# Patient Record
Sex: FEMALE | Race: BLACK OR AFRICAN AMERICAN | NOT HISPANIC OR LATINO | URBAN - METROPOLITAN AREA
[De-identification: names, ages, dates, MRNs, and addresses within clinical notes are randomized per-mention and may not be internally consistent; named-entity substitution may affect disease eponyms.]

---

## 2017-06-13 ENCOUNTER — EMERGENCY (EMERGENCY)
Facility: HOSPITAL | Age: 52
LOS: 1 days | End: 2017-06-13
Payer: SELF-PAY

## 2017-06-13 PROCEDURE — 73630 X-RAY EXAM OF FOOT: CPT | Mod: 26,RT

## 2017-06-13 PROCEDURE — 28490 TREAT BIG TOE FRACTURE: CPT | Mod: 54

## 2017-06-13 PROCEDURE — 99284 EMERGENCY DEPT VISIT MOD MDM: CPT | Mod: 25

## 2017-09-22 ENCOUNTER — EMERGENCY (EMERGENCY)
Facility: HOSPITAL | Age: 52
LOS: 1 days | End: 2017-09-22
Payer: SELF-PAY

## 2017-09-22 PROCEDURE — 99285 EMERGENCY DEPT VISIT HI MDM: CPT

## 2017-09-22 PROCEDURE — 74176 CT ABD & PELVIS W/O CONTRAST: CPT | Mod: 26

## 2018-07-20 ENCOUNTER — OUTPATIENT (OUTPATIENT)
Dept: OUTPATIENT SERVICES | Facility: HOSPITAL | Age: 53
LOS: 1 days | End: 2018-07-20

## 2018-07-20 ENCOUNTER — EMERGENCY (EMERGENCY)
Facility: HOSPITAL | Age: 53
LOS: 1 days | End: 2018-07-20
Payer: SELF-PAY

## 2018-07-20 PROCEDURE — 99285 EMERGENCY DEPT VISIT HI MDM: CPT

## 2018-07-20 PROCEDURE — 71275 CT ANGIOGRAPHY CHEST: CPT | Mod: 26

## 2018-07-20 PROCEDURE — 71046 X-RAY EXAM CHEST 2 VIEWS: CPT | Mod: 26

## 2018-07-21 ENCOUNTER — OUTPATIENT (OUTPATIENT)
Dept: OUTPATIENT SERVICES | Facility: HOSPITAL | Age: 53
LOS: 1 days | End: 2018-07-21

## 2018-10-17 ENCOUNTER — EMERGENCY (EMERGENCY)
Facility: HOSPITAL | Age: 53
LOS: 1 days | End: 2018-10-17
Payer: MEDICAID

## 2018-10-17 PROCEDURE — 70450 CT HEAD/BRAIN W/O DYE: CPT | Mod: 26

## 2018-12-05 ENCOUNTER — EMERGENCY (EMERGENCY)
Facility: HOSPITAL | Age: 53
LOS: 1 days | End: 2018-12-05
Payer: MEDICAID

## 2018-12-05 PROCEDURE — 99284 EMERGENCY DEPT VISIT MOD MDM: CPT

## 2018-12-11 ENCOUNTER — EMERGENCY (EMERGENCY)
Facility: HOSPITAL | Age: 53
LOS: 1 days | End: 2018-12-11
Payer: MEDICAID

## 2018-12-11 PROCEDURE — 99283 EMERGENCY DEPT VISIT LOW MDM: CPT

## 2018-12-11 PROCEDURE — 73130 X-RAY EXAM OF HAND: CPT | Mod: 26,LT

## 2018-12-11 PROCEDURE — 73030 X-RAY EXAM OF SHOULDER: CPT | Mod: 26,LT

## 2018-12-11 PROCEDURE — 73090 X-RAY EXAM OF FOREARM: CPT | Mod: 26,LT

## 2018-12-11 PROCEDURE — 71045 X-RAY EXAM CHEST 1 VIEW: CPT | Mod: 26

## 2018-12-11 PROCEDURE — 73110 X-RAY EXAM OF WRIST: CPT | Mod: 26,LT

## 2018-12-11 PROCEDURE — 73080 X-RAY EXAM OF ELBOW: CPT | Mod: 26,LT

## 2018-12-11 PROCEDURE — 73060 X-RAY EXAM OF HUMERUS: CPT | Mod: 26,LT

## 2019-07-01 PROBLEM — Z00.00 ENCOUNTER FOR PREVENTIVE HEALTH EXAMINATION: Noted: 2019-07-01

## 2019-07-02 ENCOUNTER — APPOINTMENT (OUTPATIENT)
Dept: CARDIOLOGY | Facility: CLINIC | Age: 54
End: 2019-07-02
Payer: MEDICAID

## 2019-07-02 ENCOUNTER — NON-APPOINTMENT (OUTPATIENT)
Age: 54
End: 2019-07-02

## 2019-07-02 VITALS
BODY MASS INDEX: 30.86 KG/M2 | SYSTOLIC BLOOD PRESSURE: 206 MMHG | WEIGHT: 192 LBS | DIASTOLIC BLOOD PRESSURE: 122 MMHG | OXYGEN SATURATION: 95 % | HEART RATE: 80 BPM | HEIGHT: 66 IN

## 2019-07-02 DIAGNOSIS — Z82.49 FAMILY HISTORY OF ISCHEMIC HEART DISEASE AND OTHER DISEASES OF THE CIRCULATORY SYSTEM: ICD-10-CM

## 2019-07-02 DIAGNOSIS — Z86.79 PERSONAL HISTORY OF OTHER DISEASES OF THE CIRCULATORY SYSTEM: ICD-10-CM

## 2019-07-02 DIAGNOSIS — R00.2 PALPITATIONS: ICD-10-CM

## 2019-07-02 DIAGNOSIS — Z78.9 OTHER SPECIFIED HEALTH STATUS: ICD-10-CM

## 2019-07-02 DIAGNOSIS — Z80.41 FAMILY HISTORY OF MALIGNANT NEOPLASM OF OVARY: ICD-10-CM

## 2019-07-02 DIAGNOSIS — F17.200 NICOTINE DEPENDENCE, UNSPECIFIED, UNCOMPLICATED: ICD-10-CM

## 2019-07-02 DIAGNOSIS — Z87.09 PERSONAL HISTORY OF OTHER DISEASES OF THE RESPIRATORY SYSTEM: ICD-10-CM

## 2019-07-02 DIAGNOSIS — F19.90 OTHER PSYCHOACTIVE SUBSTANCE USE, UNSPECIFIED, UNCOMPLICATED: ICD-10-CM

## 2019-07-02 DIAGNOSIS — Z83.3 FAMILY HISTORY OF DIABETES MELLITUS: ICD-10-CM

## 2019-07-02 PROCEDURE — 93000 ELECTROCARDIOGRAM COMPLETE: CPT

## 2019-07-02 PROCEDURE — 99202 OFFICE O/P NEW SF 15 MIN: CPT

## 2019-07-02 RX ORDER — FLUTICASONE PROPIONATE 220 MCG
AEROSOL WITH ADAPTER (GRAM) INHALATION
Refills: 0 | Status: ACTIVE | COMMUNITY

## 2019-07-02 RX ORDER — ALBUTEROL SULFATE 90 UG/1
108 (90 BASE) AEROSOL, METERED RESPIRATORY (INHALATION)
Refills: 0 | Status: ACTIVE | COMMUNITY

## 2019-07-02 NOTE — ASSESSMENT
[FreeTextEntry1] : 53-year-old female past medical history as detailed above with the following recommendations.\par \par Severe, uncontrolled hypertension. I discussed the risk of acute neurological event such as CVA or TIA in the presence of elevated blood pressure. She declines ER visit at this time. She will reinitiate her medications to include clonidine 0.3 mg t.i.d., as well as Hydrocort thiazide 25 mg daily. She will start verapamil at 120 (half a tab of 240) and if she has no symptoms after a few days may go up to her typical dose of 240 on a daily basis. She will have repeat blood pressure evaluation in a few days.\par \par Abnormal EKG, dyspnea on exertion recommend echocardiography. Once her blood pressure is controlled could consider ischemic evaluation.\par \par Palpitations, recommend 24-hour Holter monitor.\par \par We discussed nonpharmacologic ways of reducing her blood pressure and she will continue to reduce salt, avoid caffeine and alcohol. Tobacco cessation is also strongly recommended.\par \par She'll be seen in follow up after testing is completed.

## 2019-07-02 NOTE — PHYSICAL EXAM
[General Appearance - Well Developed] : well developed [Normal Appearance] : normal appearance [Well Groomed] : well groomed [General Appearance - Well Nourished] : well nourished [General Appearance - In No Acute Distress] : no acute distress [No Deformities] : no deformities [Normal Conjunctiva] : the conjunctiva exhibited no abnormalities [Eyelids - No Xanthelasma] : the eyelids demonstrated no xanthelasmas [Normal Oral Mucosa] : normal oral mucosa [No Oral Pallor] : no oral pallor [Normal Jugular Venous A Waves Present] : normal jugular venous A waves present [No Oral Cyanosis] : no oral cyanosis [Normal Jugular Venous V Waves Present] : normal jugular venous V waves present [No Jugular Venous Toney A Waves] : no jugular venous toney A waves [Heart Rate And Rhythm] : heart rate and rhythm were normal [Heart Sounds] : normal S1 and S2 [Murmurs] : no murmurs present [Exaggerated Use Of Accessory Muscles For Inspiration] : no accessory muscle use [Respiration, Rhythm And Depth] : normal respiratory rhythm and effort [Abdomen Soft] : soft [Auscultation Breath Sounds / Voice Sounds] : lungs were clear to auscultation bilaterally [Abdomen Tenderness] : non-tender [Abdomen Mass (___ Cm)] : no abdominal mass palpated [Abnormal Walk] : normal gait [Gait - Sufficient For Exercise Testing] : the gait was sufficient for exercise testing [Cyanosis, Localized] : no localized cyanosis [Nail Clubbing] : no clubbing of the fingernails [Petechial Hemorrhages (___cm)] : no petechial hemorrhages [Skin Color & Pigmentation] : normal skin color and pigmentation [] : no rash [No Venous Stasis] : no venous stasis [Skin Lesions] : no skin lesions [No Skin Ulcers] : no skin ulcer [No Xanthoma] : no  xanthoma was observed [Oriented To Time, Place, And Person] : oriented to person, place, and time [Affect] : the affect was normal [Mood] : the mood was normal [No Anxiety] : not feeling anxious

## 2019-07-02 NOTE — REVIEW OF SYSTEMS
[Headache] : headache [see HPI] : see HPI [Dyspnea on exertion] : dyspnea during exertion [Palpitations] : palpitations [Negative] : Heme/Lymph

## 2019-07-02 NOTE — HISTORY OF PRESENT ILLNESS
[FreeTextEntry1] : 53-year-old female past medical history significant for long-standing uncontrolled hypertension which is likely familial, additional history of abnormal EKG, and COPD/asthma.\par \par She presents today in cardiac consultation at the request of her primary care physician.\par \par She's been previously treated with verapamil 240 mg daily, hydrocortisone 25 mg daily and clonidine 0.3 mg t.i.d.\par \par Unfortunately she ran out of her medications and because of confusion at her pharmacy was unable to be refilled recently.\par \par At present she complains of mild headache, without visual change, or neurological deficit. She denies current chest discomfort, dizziness, syncope or near syncope, edema, PND or orthopnea.\par \par She does complain of daily palpitations described as fluttering as well as chronic dyspnea on exertion, however this is difficult to discern if this is related to COPD or asthma.\par \par Recent EKG reveals normal sinus rhythm with IVCD and mild nonspecific ST segment changes.\par \par Laboratories from March of 2019 revealed normal CMP with exception to elevate glucose of 124, as well as cholesterol profile and which was normal.

## 2019-07-05 ENCOUNTER — APPOINTMENT (OUTPATIENT)
Dept: CARDIOLOGY | Facility: CLINIC | Age: 54
End: 2019-07-05

## 2019-07-07 ENCOUNTER — EMERGENCY (EMERGENCY)
Facility: HOSPITAL | Age: 54
LOS: 1 days | End: 2019-07-07
Admitting: EMERGENCY MEDICINE
Payer: MEDICAID

## 2019-07-07 PROCEDURE — 99283 EMERGENCY DEPT VISIT LOW MDM: CPT

## 2019-07-07 PROCEDURE — 73630 X-RAY EXAM OF FOOT: CPT | Mod: 26,RT

## 2019-07-09 ENCOUNTER — EMERGENCY (EMERGENCY)
Facility: HOSPITAL | Age: 54
LOS: 1 days | End: 2019-07-09
Admitting: EMERGENCY MEDICINE
Payer: MEDICAID

## 2019-07-09 PROCEDURE — 69000 DRG XTRNL EAR ABSC/HEM SMPL: CPT

## 2019-07-09 PROCEDURE — 99283 EMERGENCY DEPT VISIT LOW MDM: CPT | Mod: 25

## 2019-07-16 ENCOUNTER — APPOINTMENT (OUTPATIENT)
Dept: CARDIOLOGY | Facility: CLINIC | Age: 54
End: 2019-07-16
Payer: MEDICAID

## 2019-07-16 PROCEDURE — 93306 TTE W/DOPPLER COMPLETE: CPT

## 2019-07-19 PROCEDURE — 93224 XTRNL ECG REC UP TO 48 HRS: CPT

## 2019-08-02 ENCOUNTER — APPOINTMENT (OUTPATIENT)
Dept: CARDIOLOGY | Facility: CLINIC | Age: 54
End: 2019-08-02
Payer: MEDICAID

## 2019-08-02 VITALS
BODY MASS INDEX: 29.73 KG/M2 | HEIGHT: 66 IN | SYSTOLIC BLOOD PRESSURE: 140 MMHG | WEIGHT: 185 LBS | OXYGEN SATURATION: 94 % | HEART RATE: 81 BPM | DIASTOLIC BLOOD PRESSURE: 86 MMHG

## 2019-08-02 DIAGNOSIS — I10 ESSENTIAL (PRIMARY) HYPERTENSION: ICD-10-CM

## 2019-08-02 PROCEDURE — 99214 OFFICE O/P EST MOD 30 MIN: CPT

## 2019-08-26 ENCOUNTER — MEDICATION RENEWAL (OUTPATIENT)
Age: 54
End: 2019-08-26

## 2019-08-27 ENCOUNTER — EMERGENCY (EMERGENCY)
Facility: HOSPITAL | Age: 54
LOS: 1 days | End: 2019-08-27
Admitting: EMERGENCY MEDICINE
Payer: MEDICAID

## 2019-08-27 PROCEDURE — 74177 CT ABD & PELVIS W/CONTRAST: CPT | Mod: 26

## 2019-09-05 ENCOUNTER — EMERGENCY (EMERGENCY)
Facility: HOSPITAL | Age: 54
LOS: 1 days | End: 2019-09-05
Admitting: EMERGENCY MEDICINE
Payer: MEDICAID

## 2019-09-05 PROCEDURE — 99284 EMERGENCY DEPT VISIT MOD MDM: CPT

## 2019-09-05 PROCEDURE — 74177 CT ABD & PELVIS W/CONTRAST: CPT | Mod: 26

## 2019-09-13 ENCOUNTER — APPOINTMENT (OUTPATIENT)
Dept: CARDIOLOGY | Facility: CLINIC | Age: 54
End: 2019-09-13

## 2019-09-23 RX ORDER — HYDROCHLOROTHIAZIDE 25 MG/1
25 TABLET ORAL DAILY
Qty: 30 | Refills: 0 | Status: ACTIVE | COMMUNITY
Start: 2019-07-02 | End: 1900-01-01

## 2019-10-15 ENCOUNTER — APPOINTMENT (OUTPATIENT)
Dept: CARDIOLOGY | Facility: CLINIC | Age: 54
End: 2019-10-15

## 2019-10-17 ENCOUNTER — EMERGENCY (EMERGENCY)
Facility: HOSPITAL | Age: 54
LOS: 1 days | End: 2019-10-17
Admitting: EMERGENCY MEDICINE
Payer: MEDICAID

## 2019-10-17 PROCEDURE — 71045 X-RAY EXAM CHEST 1 VIEW: CPT | Mod: 26

## 2019-10-17 PROCEDURE — 99285 EMERGENCY DEPT VISIT HI MDM: CPT

## 2019-10-18 PROCEDURE — 93010 ELECTROCARDIOGRAM REPORT: CPT

## 2019-10-23 ENCOUNTER — APPOINTMENT (OUTPATIENT)
Dept: CARDIOLOGY | Facility: CLINIC | Age: 54
End: 2019-10-23

## 2019-11-25 RX ORDER — VERAPAMIL HYDROCHLORIDE 240 MG/1
240 CAPSULE, DELAYED RELEASE PELLETS ORAL DAILY
Qty: 90 | Refills: 2 | Status: ACTIVE | COMMUNITY
Start: 2019-07-02 | End: 1900-01-01

## 2019-11-28 ENCOUNTER — EMERGENCY (EMERGENCY)
Facility: HOSPITAL | Age: 54
LOS: 1 days | End: 2019-11-28
Admitting: EMERGENCY MEDICINE
Payer: MEDICAID

## 2019-11-28 PROCEDURE — 71045 X-RAY EXAM CHEST 1 VIEW: CPT | Mod: 26

## 2019-11-28 PROCEDURE — 99285 EMERGENCY DEPT VISIT HI MDM: CPT

## 2019-11-29 PROCEDURE — 93010 ELECTROCARDIOGRAM REPORT: CPT

## 2019-12-31 ENCOUNTER — EMERGENCY (EMERGENCY)
Facility: HOSPITAL | Age: 54
LOS: 1 days | End: 2019-12-31
Admitting: EMERGENCY MEDICINE

## 2020-01-01 ENCOUNTER — EMERGENCY (EMERGENCY)
Facility: HOSPITAL | Age: 55
LOS: 1 days | End: 2020-01-01
Admitting: EMERGENCY MEDICINE

## 2020-01-03 ENCOUNTER — NON-APPOINTMENT (OUTPATIENT)
Age: 55
End: 2020-01-03

## 2020-01-03 ENCOUNTER — APPOINTMENT (OUTPATIENT)
Dept: CARDIOLOGY | Facility: CLINIC | Age: 55
End: 2020-01-03
Payer: MEDICAID

## 2020-01-03 VITALS
DIASTOLIC BLOOD PRESSURE: 94 MMHG | WEIGHT: 198 LBS | BODY MASS INDEX: 31.82 KG/M2 | HEART RATE: 59 BPM | OXYGEN SATURATION: 98 % | SYSTOLIC BLOOD PRESSURE: 142 MMHG | HEIGHT: 66 IN

## 2020-01-03 PROCEDURE — 93000 ELECTROCARDIOGRAM COMPLETE: CPT

## 2020-01-03 PROCEDURE — 99214 OFFICE O/P EST MOD 30 MIN: CPT

## 2020-01-03 NOTE — HISTORY OF PRESENT ILLNESS
[FreeTextEntry1] : 54F w/ PMH of HTN, COPD/asthma presenting for routine follow up.  She has had two weeks of left arm pain and numbness/tingling.  She attempted to go to the ER but there was a long weight on NYE and New Year's day.  She notes that the pain usually occurs with sitting and improves if she changes her arm position.  She has scant chest pains that sometimes occur with exertion.  She has been mostly compliant with her medications.  She missed her stress testing appt.

## 2020-01-03 NOTE — REASON FOR VISIT
[Follow-Up - Clinic] : a clinic follow-up of [Chest Pain] : chest pain [Hypertension] : hypertension [Dyspnea] : dyspnea [Medication Management] : Medication management

## 2020-01-03 NOTE — PHYSICAL EXAM
[General Appearance - Well Developed] : well developed [Normal Appearance] : normal appearance [Well Groomed] : well groomed [General Appearance - Well Nourished] : well nourished [No Deformities] : no deformities [Normal Conjunctiva] : the conjunctiva exhibited no abnormalities [General Appearance - In No Acute Distress] : no acute distress [Normal Oral Mucosa] : normal oral mucosa [Eyelids - No Xanthelasma] : the eyelids demonstrated no xanthelasmas [No Oral Pallor] : no oral pallor [Normal Jugular Venous A Waves Present] : normal jugular venous A waves present [No Oral Cyanosis] : no oral cyanosis [Normal Jugular Venous V Waves Present] : normal jugular venous V waves present [No Jugular Venous Toney A Waves] : no jugular venous toney A waves [Respiration, Rhythm And Depth] : normal respiratory rhythm and effort [Exaggerated Use Of Accessory Muscles For Inspiration] : no accessory muscle use [Auscultation Breath Sounds / Voice Sounds] : lungs were clear to auscultation bilaterally [Heart Rate And Rhythm] : heart rate and rhythm were normal [Heart Sounds] : normal S1 and S2 [Murmurs] : no murmurs present [Abdomen Soft] : soft [Abdomen Tenderness] : non-tender [Abdomen Mass (___ Cm)] : no abdominal mass palpated [Abnormal Walk] : normal gait [Gait - Sufficient For Exercise Testing] : the gait was sufficient for exercise testing [Nail Clubbing] : no clubbing of the fingernails [Cyanosis, Localized] : no localized cyanosis [Petechial Hemorrhages (___cm)] : no petechial hemorrhages [Skin Color & Pigmentation] : normal skin color and pigmentation [] : no rash [No Venous Stasis] : no venous stasis [Skin Lesions] : no skin lesions [No Skin Ulcers] : no skin ulcer [No Xanthoma] : no  xanthoma was observed [Oriented To Time, Place, And Person] : oriented to person, place, and time [Affect] : the affect was normal [Mood] : the mood was normal [No Anxiety] : not feeling anxious [Arterial Pulses Normal] : the arterial pulses were normal [Bowel Sounds] : normal bowel sounds

## 2020-01-03 NOTE — DISCUSSION/SUMMARY
[With Me] : with me [___ Month(s)] : [unfilled] month(s) [FreeTextEntry1] : 54F w/ PMH as above presenting for routine follow up.  She missed her ETT appointment.  Her left arm numbness does not appear to be anginal in nature.  \par \par 1. HTN - mildly controlled today, continue HCTZ 25 mg PO daily, verapamil 240 mg PO daily and clonidine 0.3 mg PO TID\par 2. Smoking - cessation advised\par 3. SOB, chest pain - ETT, will call with results\par \par RTC 6 months

## 2020-01-16 ENCOUNTER — APPOINTMENT (OUTPATIENT)
Dept: CARDIOLOGY | Facility: CLINIC | Age: 55
End: 2020-01-16
Payer: MEDICAID

## 2020-01-16 DIAGNOSIS — R94.31 ABNORMAL ELECTROCARDIOGRAM [ECG] [EKG]: ICD-10-CM

## 2020-01-16 DIAGNOSIS — R06.02 SHORTNESS OF BREATH: ICD-10-CM

## 2020-01-16 DIAGNOSIS — R07.89 OTHER CHEST PAIN: ICD-10-CM

## 2020-01-16 PROCEDURE — 93015 CV STRESS TEST SUPVJ I&R: CPT

## 2020-01-27 ENCOUNTER — APPOINTMENT (OUTPATIENT)
Dept: CARDIOLOGY | Facility: CLINIC | Age: 55
End: 2020-01-27

## 2020-02-17 RX ORDER — CLONIDINE HYDROCHLORIDE 0.3 MG/1
0.3 TABLET ORAL 3 TIMES DAILY
Qty: 270 | Refills: 1 | Status: ACTIVE | COMMUNITY
Start: 2019-07-02 | End: 1900-01-01

## 2020-02-25 ENCOUNTER — EMERGENCY (EMERGENCY)
Facility: HOSPITAL | Age: 55
LOS: 1 days | End: 2020-02-25
Admitting: EMERGENCY MEDICINE
Payer: MEDICAID

## 2020-02-25 PROCEDURE — 72125 CT NECK SPINE W/O DYE: CPT | Mod: 26

## 2020-02-25 PROCEDURE — 99285 EMERGENCY DEPT VISIT HI MDM: CPT

## 2020-02-25 PROCEDURE — 70450 CT HEAD/BRAIN W/O DYE: CPT | Mod: 26

## 2020-02-28 ENCOUNTER — OUTPATIENT (OUTPATIENT)
Dept: OUTPATIENT SERVICES | Facility: HOSPITAL | Age: 55
LOS: 1 days | End: 2020-02-28
Payer: MEDICAID

## 2020-02-28 PROCEDURE — 73030 X-RAY EXAM OF SHOULDER: CPT | Mod: 26,LT

## 2020-02-28 PROCEDURE — 72050 X-RAY EXAM NECK SPINE 4/5VWS: CPT | Mod: 26

## 2020-03-12 ENCOUNTER — EMERGENCY (EMERGENCY)
Facility: HOSPITAL | Age: 55
LOS: 1 days | End: 2020-03-12
Admitting: EMERGENCY MEDICINE
Payer: MEDICAID

## 2020-03-12 PROCEDURE — 73030 X-RAY EXAM OF SHOULDER: CPT | Mod: 26,LT

## 2020-03-12 PROCEDURE — 72125 CT NECK SPINE W/O DYE: CPT | Mod: 26

## 2020-03-12 PROCEDURE — 71045 X-RAY EXAM CHEST 1 VIEW: CPT | Mod: 26

## 2020-03-12 PROCEDURE — 70450 CT HEAD/BRAIN W/O DYE: CPT | Mod: 26

## 2020-03-12 PROCEDURE — 99285 EMERGENCY DEPT VISIT HI MDM: CPT

## 2020-04-20 ENCOUNTER — EMERGENCY (EMERGENCY)
Facility: HOSPITAL | Age: 55
LOS: 1 days | End: 2020-04-20
Admitting: EMERGENCY MEDICINE
Payer: MEDICAID

## 2020-04-20 PROCEDURE — 99285 EMERGENCY DEPT VISIT HI MDM: CPT

## 2020-04-20 PROCEDURE — 71045 X-RAY EXAM CHEST 1 VIEW: CPT | Mod: 26

## 2020-06-08 ENCOUNTER — EMERGENCY (EMERGENCY)
Facility: HOSPITAL | Age: 55
LOS: 1 days | End: 2020-06-08
Admitting: EMERGENCY MEDICINE
Payer: MEDICAID

## 2020-06-08 PROCEDURE — 99283 EMERGENCY DEPT VISIT LOW MDM: CPT

## 2020-07-27 ENCOUNTER — APPOINTMENT (OUTPATIENT)
Dept: CARDIOLOGY | Facility: CLINIC | Age: 55
End: 2020-07-27

## 2020-08-16 ENCOUNTER — RX RENEWAL (OUTPATIENT)
Age: 55
End: 2020-08-16

## 2020-09-18 ENCOUNTER — APPOINTMENT (OUTPATIENT)
Dept: CARDIOLOGY | Facility: CLINIC | Age: 55
End: 2020-09-18

## 2024-10-29 ENCOUNTER — EMERGENCY (EMERGENCY)
Facility: HOSPITAL | Age: 59
LOS: 1 days | Discharge: HOME | End: 2024-10-29
Attending: STUDENT IN AN ORGANIZED HEALTH CARE EDUCATION/TRAINING PROGRAM
Payer: MEDICAID

## 2024-10-29 VITALS
RESPIRATION RATE: 18 BRPM | SYSTOLIC BLOOD PRESSURE: 145 MMHG | HEART RATE: 55 BPM | DIASTOLIC BLOOD PRESSURE: 98 MMHG | OXYGEN SATURATION: 95 % | TEMPERATURE: 98 F

## 2024-10-29 VITALS
RESPIRATION RATE: 18 BRPM | WEIGHT: 169.98 LBS | HEART RATE: 62 BPM | SYSTOLIC BLOOD PRESSURE: 164 MMHG | TEMPERATURE: 98 F | DIASTOLIC BLOOD PRESSURE: 123 MMHG | OXYGEN SATURATION: 97 %

## 2024-10-29 DIAGNOSIS — H57.89 OTHER SPECIFIED DISORDERS OF EYE AND ADNEXA: ICD-10-CM

## 2024-10-29 DIAGNOSIS — J44.9 CHRONIC OBSTRUCTIVE PULMONARY DISEASE, UNSPECIFIED: ICD-10-CM
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
## 2024-10-29 DIAGNOSIS — Z23 ENCOUNTER FOR IMMUNIZATION: ICD-10-CM

## 2024-10-29 DIAGNOSIS — I10 ESSENTIAL (PRIMARY) HYPERTENSION: ICD-10-CM
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
## 2024-10-29 DIAGNOSIS — S05.01XA INJURY OF CONJUNCTIVA AND CORNEAL ABRASION WITHOUT FOREIGN BODY, RIGHT EYE, INITIAL ENCOUNTER: ICD-10-CM

## 2024-10-29 PROCEDURE — 99284 EMERGENCY DEPT VISIT MOD MDM: CPT

## 2024-10-29 PROCEDURE — 90471 IMMUNIZATION ADMIN: CPT

## 2024-10-29 PROCEDURE — 90715 TDAP VACCINE 7 YRS/> IM: CPT

## 2024-10-29 PROCEDURE — 99283 EMERGENCY DEPT VISIT LOW MDM: CPT | Mod: 25

## 2024-10-29 RX ORDER — CLOSTRIDIUM TETANI TOXOID ANTIGEN (FORMALDEHYDE INACTIVATED), CORYNEBACTERIUM DIPHTHERIAE TOXOID ANTIGEN (FORMALDEHYDE INACTIVATED), BORDETELLA PERTUSSIS TOXOID ANTIGEN (GLUTARALDEHYDE INACTIVATED), BORDETELLA PERTUSSIS FILAMENTOUS HEMAGGLUTININ ANTIGEN (FORMALDEHYDE INACTIVATED), BORDETELLA PERTUSSIS PERTACTIN ANTIGEN, AND BORDETELLA PERTUSSIS FIMBRIAE 2/3 ANTIGEN 5; 2; 2.5; 5; 3; 5 [LF]/.5ML; [LF]/.5ML; UG/.5ML; UG/.5ML; UG/.5ML; UG/.5ML
0.5 INJECTION, SUSPENSION INTRAMUSCULAR ONCE
Refills: 0 | Status: COMPLETED | OUTPATIENT
Start: 2024-10-29 | End: 2024-10-29

## 2024-10-29 RX ORDER — OFLOXACIN 3 MG/ML
2 SOLUTION OPHTHALMIC ONCE
Refills: 0 | Status: COMPLETED | OUTPATIENT
Start: 2024-10-29 | End: 2024-10-29

## 2024-10-29 RX ADMIN — OFLOXACIN 2 DROP(S): 3 SOLUTION OPHTHALMIC at 10:10

## 2024-10-29 RX ADMIN — CLOSTRIDIUM TETANI TOXOID ANTIGEN (FORMALDEHYDE INACTIVATED), CORYNEBACTERIUM DIPHTHERIAE TOXOID ANTIGEN (FORMALDEHYDE INACTIVATED), BORDETELLA PERTUSSIS TOXOID ANTIGEN (GLUTARALDEHYDE INACTIVATED), BORDETELLA PERTUSSIS FILAMENTOUS HEMAGGLUTININ ANTIGEN (FORMALDEHYDE INACTIVATED), BORDETELLA PERTUSSIS PERTACTIN ANTIGEN, AND BORDETELLA PERTUSSIS FIMBRIAE 2/3 ANTIGEN 0.5 MILLILITER(S): 5; 2; 2.5; 5; 3; 5 INJECTION, SUSPENSION INTRAMUSCULAR at 10:10

## 2024-10-29 NOTE — ED PROVIDER NOTE - CARE PROVIDER_API CALL
Pradeep Palomino  Ophthalmology  [address] [place] [address], [address]  [place] [address]  Phone: [phone]  Fax: [phone]  Follow Up Time:

## 2024-10-29 NOTE — ED PROVIDER NOTE - PHYSICAL EXAMINATION
Gen: No acute distress, non toxic  Head: NCAT  Eyes: +R sclera injected. No FB visualized. pink conjunctivae, EOMI. Visual acuity 20/200 OD. R pupil reactive. L pupil chronic deformity. Fluorescein stain with corneal abrasion noted to 6 o'clock position of pupil.   Neuro: A&O x 3, sensorimotor intact without deficits   MSK: No spine or joint tenderness to palpation, Full ROM ext x 4  Skin: No rashes. intact and perfused.

## 2024-10-29 NOTE — ED PROVIDER NOTE - ATTENDING APP SHARED VISIT CONTRIBUTION OF CARE
58-year-old female with past medical history of hypertension, COPD presents with right eye redness pain and tearing.  Patient states she woke up this morning with no symptoms but then she was blinking felt something underneath her upper eyelid scratch her eye.  Patient started to have redness irritation and tearing and blurry vision to the right eye.  Patient with known blindness of the left eye.  Patient with corneal abrasion started on antibiotics instructed to follow-up with ophthalmology patient feels comfortable going home has family member at bedside to take patient home.

## 2024-10-29 NOTE — ED PROVIDER NOTE - PATIENT PORTAL LINK FT
You can access the FollowMyHealth Patient Portal offered by [facility] by registering at the following website: [email or web address]. By joining [employer]’s FollowMyHealth portal, you will also be able to view your health information using other applications (apps) compatible with our system.

## 2024-10-29 NOTE — ED ADULT NURSE NOTE - OBJECTIVE STATEMENT
Pt is a 58YOF who is here for redness in her right eye, pt states it feels like it is burning, pt states that she woke up with the burning in her eye, no crusting, endorses blurred vision from how much her eye is watering.

## 2024-10-29 NOTE — ED PROVIDER NOTE - NS ED MD DISPO DISCHARGE CCDA
[facility] ENCOUNTER  FAMILY PRACTICE OFFICE VISIT    CHIEF COMPLAINT:    Chief Complaint   Patient presents with   • Follow-up   • Sinus Problem     feels like she has a sinusitis       SUBJECTIVE:  Aleksandra Anne is a 61 year old female who presented requesting evaluation for recheck. Concerned about sinus pressure though it does seem to be getting better.  She is feeling fatigued however.  She has received iron infusions in the past.  She still is in recovery from her knee surgery.  She had tried to kneel and with gardening.  She has 2 open sores.  She would like me to contact Dr. Beal to let him know.  I have taken a picture.  I will inform him.  She denies any fevers.  She otherwise feels stable.  She is here today with her .       REVIEW OF SYSTEMS:    All other systems are reviewed and are negative except as documented in the history of present illness.     OBJECTIVE:  PROBLEM LIST:    Patient Active Problem List   Diagnosis   • Anal or rectal pain   • Blood in stool   • Heme + stool   • Chronic constipation   • Chronic pain   • Depressive disorder, not elsewhere classified   • Anoxic encephalopathy (CMS/HCC)   • Stress incontinence   • Varicose veins of lower extremities with inflammation   • Extrinsic asthma, unspecified   • Rosacea   • Irritant dermatitis   • Chest pain on breathing   • Precordial pain   • Chest pain, B/L ribs   • Contusion of left hand   • Trauma of chest   • Generalized anxiety disorder   • H/O gastric bypass   • Left-sided chest wall pain   • Rotator cuff rupture, complete   • Osteoarthrosis, unspecified whether generalized or localized, shoulder region   • Difficult airway for intubation   • Prepatellar bursitis   • Osteoarthrosis, unspecified whether generalized or localized, lower leg   • Irritant contact dermatitis   • Keratolysis exfoliativa   • Chronic pain of left knee   • Rupture of quadriceps tendon   • S/P TKR (total knee replacement) using cement   • Skin  breakdown   • Quadriceps muscle rupture   • Closed fracture distal radius and ulna   • Autonomic orthostatic hypotension   • Major depressive disorder, recurrent, severe without psychotic features (CMS/HCC)   • Seizure X1   • Irritable bowel syndrome with constipation   • Tilt table evaluation   • Syncope   • Iron deficiency anemia secondary to inadequate dietary iron intake   • Osteoporosis   • Vitamin D deficiency   • Chronic nausea   • Disturbance of salivary secretion   • Hypocalcemia   • Aseptic loosening of prosthetic joint (CMS/HCC)   • Abscess of knee, left   • Anemia   • Dementia (CMS/HCC)   • Infection of total left knee replacement (CMS/HCC)   • Seasonal allergies   • Postoperative anemia due to acute blood loss   • Status post revision of total knee, left   • Essential hypertension       PAST HISTORIES:  Allergies, Medications, Medical History, Surgical History, Social History and Family History were reviewed and updated.  Current Outpatient Medications   Medication Sig Dispense Refill   • DULoxetine (CYMBALTA) 60 MG capsule Take 1 capsule by mouth 2 times daily. 60 capsule 0   • betamethasone dipropionate (DIPROSONE) 0.05 % cream Apply topically 2 times daily. 45 g 1   • HYDROcodone-acetaminophen (NORCO)  MG per tablet Take 1/2 - 1 tablet by mouth every 6 hours as needed for Pain. 30 tablet 0   • lamotrigine (LAMICTAL) 200 MG tablet Take 1 tablet by mouth 2 times daily. 60 tablet 0   • metoPROLOL tartrate (LOPRESSOR) 25 MG tablet Take HALF a tablet by mouth twice daily 30 tablet 0   • oxybutynin (DITROPAN XL) 10 MG 24 hr tablet Take 2 tablets by mouth daily. 60 tablet 0   • pantoprazole (PROTONIX) 40 MG tablet Take 1 tablet by mouth 2 times daily. 180 tablet 0   • gabapentin (NEURONTIN) 300 MG capsule Take 3 capsules by mouth 3 times daily. 90 capsule 0   • zonisamide (ZONEGRAN) 100 MG capsule Take 1 capsule by mouth nightly. 90 capsule 0   • estrogens, conjugated, (PREMARIN) 0.9 MG tablet Take 1  tablet by mouth daily. 90 tablet 3   • fluticasone (FLONASE) 50 MCG/ACT nasal spray Spray 2 sprays in each nostril daily. 16 g 3   • traZODone (DESYREL) 100 MG tablet Take 1 tablet by mouth 3 times daily. 270 tablet 1   • FIBER PO Take 100 mg by mouth 2 times daily.     • hydroCORTisone (CORTIZONE) 2.5 % cream Apply 1 application topically as needed.     • polyethylene glycol (MIRALAX) powder USE TWO AND ONE-FOURTHS SCOOPS IN 8 OUNCE OF LIQUID TWICE DAILY 2635 g 5   • Alum & Mag Hydroxide-Simeth (MYLANTA PO) Take 125 mg by mouth as needed. Takes three tablets as needed after a meal     • fexofenadine (ALLEGRA ALLERGY) 180 MG tablet Take 180 mg by mouth 2 times daily. Indications: Persistent Hives of Unknown Cause     • Multiple Vitamins-Minerals (MULTIVITAMIN GUMMIES ADULT PO) Take 2 each by mouth daily.      • docusate sodium (COLACE) 100 MG capsule Take 200 mg by mouth 2 times daily.  90 capsule 12   • hydrOXYzine (ATARAX) 25 MG tablet Take 1 tablet by mouth 3 times daily as needed for Itching. 30 tablet 1   • ondansetron (ZOFRAN ODT) 8 MG disintegrating tablet Place 1 tablet onto the tongue every 8 hours as needed for Nausea. 50 tablet 0   • senna (SENNA LAX) 8.6 MG tablet Take 1 tablet by mouth daily. (Patient taking differently: Take 1 tablet by mouth 2 times daily. ) 60 tablet 1   • acetaminophen (TYLENOL 8 HOUR ARTHRITIS PAIN) 650 MG CR tablet Take 650 mg by mouth every 8 hours as needed for Pain (muscle ache and pain).      • Artificial Saliva (AQUORAL) Solution Take 2 sprays by mouth 4 times daily as needed (xerostomia). 10 mL 5   • sumatriptan (IMITREX) 100 MG tablet Take 1 tablet by mouth as needed for Migraine. Take 1 tablet by mouth at onset of migraine. May repeat after 2 hours if needed. 4 tablet 5   • diphenhydrAMINE (BENADRYL) 25 MG capsule Take 50 mg by mouth every 4 hours as needed for Itching.     • Calcium Carbonate Antacid (TUMS ULTRA 1000 PO) Take 1 tablet by mouth as needed.      •  Simethicone (MYLANTA GAS MINIS PO) Take 3 tablets by mouth 3 times daily as needed.      • olopatadine (PATADAY) 0.2 % ophthalmic solution Place 1 drop into both eyes daily. (Patient taking differently: Place 1 drop into both eyes daily. just taking PRN for seasonal issues) 2.5 mL 12   • ketoconazole (NIZORAL) 2 % cream Apply to face once daily. Once improved, decrease use to 3x/week (Patient taking differently: as needed. Apply to face once daily. Once improved, decrease use to 3x/week) 30 g 3   • albuterol 108 (90 Base) MCG/ACT inhaler Inhale 2 puffs into the lungs every 4 hours as needed for Shortness of Breath or Wheezing. 8.5 g 5     No current facility-administered medications for this visit.      Immunization History   Administered Date(s) Administered   • Influenza, Unspecified Formulation 09/29/2011   • Influenza, injectable, quadrivalent 01/22/2015   • Influenza, seasonal, injectable, trivalent 09/29/2011   • Pneumococcal polysaccharide, adult, 23 valent 11/25/2015   • TD Adult, Adsorbed 08/25/1981, 01/27/2003   • Td:Adult type tetanus/diphtheria 08/25/1981, 01/27/2003, 09/10/2015   • Tdap 09/10/2015     Past Medical History:   Diagnosis Date   • Acute bronchitis    • Allergy    • Anoxic encephalopathy (CMS/HCC)    • Anxiety    • Arthritis    • Asthma    • Autonomic orthostatic hypotension    • Chronic constipation    • Dementia (CMS/HCC)    • Depressive disorder    • Difficult airway for intubation     tracheal stenosis   • Esophageal reflux    • Fracture    • Frequent falls    • H/O gastric bypass    • Heart murmur    • Hemorrhoids    • Hypocalcemia    • Migraines    • MRSA (methicillin resistant Staphylococcus aureus) 2006    MRSA + 2006. 8/7/19 Nares Negative, MRSA Resolved 9/6/19.   • Osteoporosis    • PAST MEDICAL HISTORY     PMH of exzema   • Personal history of traumatic fracture    • Pneumonia    • PONV (postoperative nausea and vomiting)    • Refused influenza vaccine 10/30/2012   • Rosacea    •  Patient/Caregiver provided printed discharge information. Rotator cuff tear    • Seasonal allergies    • Seizure (CMS/HCC) 2016    was found in a coma a few weeks ago   • Skin breakdown 6/17/2015    Two areas abdomen, chronic reoccurrence    • Tilt table evaluation 1/17/2017    Tilt table evaluation: Severe supine HTN OH present but in unmedicated state 's   • Unspecified sinusitis (chronic)    • Urinary incontinence     wears depends   • Vitamin D deficiency    • Wears eyeglasses      Past Surgical History:   Procedure Laterality Date   • Anesth,shoulder replacement  09/20/2012    right   • Arthroscopy knee medial or lat  11/2005    meniscal tear left   • Bariatric surgery  01/04/2006   • Colonoscopy  11/04/2016    Change in bowel habits, recall 10yrs   • Ercp  10/28/2016    Dilated CBD   • Esophagogastroduodenoscopy  11/04/2016    Abdominal pain   • Hysterectomy  1980    pt unsure if ovaries are intact   • Jejunostomy  3/10/2006   • Joint replacement Left 02/03/2015    knee   • Lap domingo-en-y gastric by pass  1/4/2006    Severe complications - 165 days continuosly in ICU   • Laparoscopic cholecystectomy  1980   • Partial knee arthroplasty Left 09/10/2019    Revision-Tibial component   • Past surgical history  1/2006    exploratory laparoscopy with resection of greater curve of stomach   • Past surgical history  1/2006    repair of dehiscence of staple line of stomach   • Past surgical history  1/2006    revision gastrojejunostomy and distal gastrostomy tube placement   • Past surgical history  4/27/2006; 2/13/2006; 2/10/2006; 2/8/2006    debridement of necrotizing fasciitis with resection of omentum and rectus abdominus muscles   • Past surgical history  [id number]    multiple skin grafts and 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   • Removal gallbladder     • Repair bowel-bowel fistula  3/24/2006; 3/7/2006    gastrorrhaphy, debridement; small bowel x2   • Repair of hammertoe,one  2004   • Skin split graft  05/2006    to abdomen from legs   • Tilt   01/17/2017    Severe supine HTN,OH present but in unmedicated state 's (Dr. [facility])   • Tracheostomy, planned  9/14/2006; 1/16/2006    removed 6/19/2006     Social History     Tobacco Use   • Smoking status: Never Smoker   • Smokeless tobacco: Never Used   Substance Use Topics   • Alcohol use: No     Alcohol/week: 0.0 standard drinks     Frequency: Never     Drinks per session: 1 or 2     Binge frequency: Never   • Drug use: No     Family History   Problem Relation Age of Onset   • Cancer Mother         breast   • Asthma Mother    • Arthritis Mother    • Dementia/Alzheimers Mother    • Heart disease Father    • Stroke Father    • Cancer Daughter         breast   • Depression Daughter    • Cancer Brother         throat       PHYSICAL EXAM:    Vital Signs:    Visit Vitals  /62 (BP Location: LUE - Left upper extremity, Patient Position: Sitting, Cuff Size: Small Adult)   Pulse 64   Temp 98.4 °F (36.9 °C) (Temporal)   Resp 16   Ht 5' 3\" (1.6 m)   Wt 49.8 kg   BMI 19.45 kg/m²     General:  Alert, cooperative, conversive.  Skin:  Warm and dry without rash.  There are 2 open areas along her incisional line better superficial certainly less than 1 mm and with less than 3 mm.  See photo.    Head:  Normocephalic-atraumatic.   Neck:  Trachea is midline. No adenopathy.  Normal thyroid without mass or tenderness..   Eyes:  Normal conjunctivae and sclerae.  Pupils equal, round, reactive to light.    ENT:  Mucous membranes are moist.  Normal tympanic membranes and external auditory canals bilaterally.  No pharyngeal erythema or exudate.  No facial tenderness.  Normal nasal mucosa.  Cardiovascular:  Regular, rate and rhythm without murmur.  Respiratory:  Normal respiratory effort.   Psychiatric:   Cooperative.  Appropriate mood and affect.    ASSESSMENT:   1. Iron deficiency    2. Symptomatic menopausal or female climacteric states    3. Major depressive disorder, recurrent episode, moderate (CMS/HCC)         PLAN:  Today she has minimal complaints with her .  Orders Placed This Encounter   • CBC with Automated Differential   • Iron And total Iron Binding Capacity   • Glycohemoglobin   • SERVICE TO HEMATOLOGY ONCOLOGY     Instructions provided as documented in the After Visit Summary.    The patient indicated understanding of the diagnosis and agreed with the plan of care.

## 2024-10-29 NOTE — ED PROVIDER NOTE - OBJECTIVE STATEMENT
57yo F PMHx HTN, COPD presents to ED c/o right eye redness, pain, tearing and blurry vision onset this morning. States she woke up this morning without symptoms but after she blinked felt like something under her upper lid may have scratched her eye. Then began experiencing redness, irritation, tearing and blurry vision to right eye. 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 Tried rinsing eyes with cool water and applied compresses without relief. Hypertensive in triage, states she just took BP meds prior to arrival and this is not uncommon for her. Denies headache, dizziness, eye trauma, cp, sob. Does not wear contacts or glasses. Unsure of last tetanus. 57yo F PMHx HTN, COPD presents to ED c/o right eye redness, pain, tearing and blurry vision onset this morning. States she woke up this morning without symptoms but after she blinked felt like something under her upper lid may have scratched her eye. Then began experiencing redness, irritation, tearing and blurry vision to right eye. 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 Tried rinsing eyes with cool water and applied compresses without relief. Hypertensive in triage, states she just took BP meds prior to arrival and this is not uncommon for her. Denies headache, dizziness, photophobia, halos around lights/floaters, nausea, vomiting, visual loss, eye trauma, cp, sob. Does not wear contacts or glasses. Unsure of last tetanus.

## 2024-10-29 NOTE — ED ADULT NURSE NOTE - NSFALLUNIVINTERV_ED_ALL_ED
Bed/Stretcher in lowest position, wheels locked, appropriate side rails in place/Call bell, personal items and telephone in reach/Instruct patient to call for assistance before getting out of bed/chair/stretcher/Non-slip footwear applied when patient is off stretcher/[place] to call system/Physically safe environment - no spills, clutter or unnecessary equipment/Purposeful proactive rounding/Room/bathroom lighting operational, light cord in reach

## 2024-10-29 NOTE — ED PROVIDER NOTE - NSFOLLOWUPINSTRUCTIONS_ED_ALL_ED_FT
- Return to the ED for any new or worsening symptoms.   - Use eye drops as directed, 1-2 drops every 4 hours for 5 days  - Follow-up with ophthalmologist provided within 48 hours    Corneal Abrasion    The cornea is the clear covering at the front and center of the eye. This very thin tissue is made up of many layers. If a scratch or injury causes the corneal epithelium to come off, it is called a corneal abrasion. Symptoms include eye pain, redness, tearing, difficulty keeping eye open, and light sensitivity. Do not drive or operate machinery if your eye is patched.  Antibiotic eye drops may be prescribed to reduce the risk of infection.  It is important to follow up with an ophthalmologist (eye doctor) to ensure proper healing.    SEEK IMMEDIATE MEDICAL CARE IF YOU HAVE ANY OF THE FOLLOWING SYMPTOMS: discharge from eyes, changes in vision, fever, or swelling.

## 2024-10-29 NOTE — ED ADULT TRIAGE NOTE - CHIEF COMPLAINT QUOTE
BIBEMS for right eye redness. Pt states that she cannot see out of the left eye at baseline. Reports that her right eye has been tearing and has become red this AM. PMHx HTN.

## 2024-10-29 NOTE — ED PROVIDER NOTE - CLINICAL SUMMARY MEDICAL DECISION MAKING FREE TEXT BOX
59yo F PMHx HTN, COPD presents to ED c/o right eye redness, pain, tearing and blurry vision onset this morning. States she woke up this morning without symptoms but after she blinked felt like something under her upper lid may have scratched her eye. Then began experiencing redness, irritation, tearing and blurry vision to right eye. 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 Hypertensive in triage, states she just took BP meds prior to arrival and this is not uncommon for her, no CP, HA or SOB. Wood's lamp exam demonstrating corneal abrasion. Updated on tetanus and given ocuflox drops. Pt instructed to f/u with ophthalmology within 48 hours 57yo F PMHx HTN, COPD presents to ED c/o right eye redness, pain, tearing and blurry vision onset this morning. States she woke up this morning without symptoms but after she blinked felt like something under her upper lid may have scratched her eye. Then began experiencing redness, irritation, tearing and blurry vision to right eye. 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 Hypertensive in triage, states she just took BP meds prior to arrival and this is not uncommon for her, no CP, HA or SOB. BP improved on repeat once pain improved. Wood's lamp exam demonstrating corneal abrasion. Updated on tetanus and given ocuflox drops. Pt instructed to f/u with ophthalmology within 48 hours

## 2025-02-14 ENCOUNTER — EMERGENCY (EMERGENCY)
Facility: HOSPITAL | Age: 60
LOS: 0 days | Discharge: HOME | End: 2025-02-14
Attending: EMERGENCY MEDICINE
Payer: MEDICAID

## 2025-02-14 VITALS
WEIGHT: 223.11 LBS | RESPIRATION RATE: 18 BRPM | TEMPERATURE: 98 F | DIASTOLIC BLOOD PRESSURE: 101 MMHG | HEART RATE: 62 BPM | SYSTOLIC BLOOD PRESSURE: 181 MMHG | OXYGEN SATURATION: 100 %

## 2025-02-14 DIAGNOSIS — R06.02 SHORTNESS OF BREATH: ICD-10-CM

## 2025-02-14 DIAGNOSIS — J44.1 CHRONIC OBSTRUCTIVE PULMONARY DISEASE WITH (ACUTE) EXACERBATION: ICD-10-CM

## 2025-02-14 DIAGNOSIS — Z88.5 ALLERGY STATUS TO NARCOTIC AGENT: ICD-10-CM

## 2025-02-14 DIAGNOSIS — F17.210 NICOTINE DEPENDENCE, CIGARETTES, UNCOMPLICATED: ICD-10-CM

## 2025-02-14 DIAGNOSIS — Z91.018 ALLERGY TO OTHER FOODS: ICD-10-CM

## 2025-02-14 DIAGNOSIS — Z88.0 ALLERGY STATUS TO PENICILLIN: ICD-10-CM

## 2025-02-14 PROCEDURE — 71046 X-RAY EXAM CHEST 2 VIEWS: CPT

## 2025-02-14 PROCEDURE — 94640 AIRWAY INHALATION TREATMENT: CPT

## 2025-02-14 PROCEDURE — 99283 EMERGENCY DEPT VISIT LOW MDM: CPT | Mod: 25

## 2025-02-14 PROCEDURE — 99284 EMERGENCY DEPT VISIT MOD MDM: CPT

## 2025-02-14 PROCEDURE — 93005 ELECTROCARDIOGRAM TRACING: CPT

## 2025-02-14 PROCEDURE — 71046 X-RAY EXAM CHEST 2 VIEWS: CPT | Mod: 26

## 2025-02-14 PROCEDURE — 93010 ELECTROCARDIOGRAM REPORT: CPT

## 2025-02-14 RX ORDER — CLONIDINE HYDROCHLORIDE 0.2 MG/1
1 TABLET ORAL
Qty: 21 | Refills: 0
Start: 2025-02-14 | End: 2025-02-20

## 2025-02-14 RX ORDER — PREDNISONE 5 MG/1
50 TABLET ORAL ONCE
Refills: 0 | Status: COMPLETED | OUTPATIENT
Start: 2025-02-14 | End: 2025-02-14

## 2025-02-14 RX ORDER — ALBUTEROL 90 MCG
2 AEROSOL REFILL (GRAM) INHALATION ONCE
Refills: 0 | Status: COMPLETED | OUTPATIENT
Start: 2025-02-14 | End: 2025-02-14

## 2025-02-14 RX ORDER — PREDNISONE 5 MG/1
2 TABLET ORAL
Qty: 8 | Refills: 0
Start: 2025-02-14 | End: 2025-02-17

## 2025-02-14 RX ADMIN — Medication 2 PUFF(S): at 22:20

## 2025-02-14 RX ADMIN — PREDNISONE 50 MILLIGRAM(S): 5 TABLET ORAL at 22:18

## 2025-02-14 NOTE — ED STATDOCS - NSFOLLOWUPINSTRUCTIONS_ED_ALL_ED_FT
Please follow-up with your doctor(s) within the next 3 days, but see medical sooner if your symptoms persist or worsen.  Please call tomorrow for an appointment.    You were given a copy of your labs and/or imaging.  Please go-over these with your doctor(s).     If you have any worsening of symptoms or any other concerns please see your doctor or return to the ED immediately.    Please continue taking your home medications as directed    ANY PENDING RESULTS: You can access the [facility] Patient Portal offered by [facility] by registering at the following website: [email or web address]. By joining [facility]’s FollowMyHealth portal, you will also be able to view your health information using other applications (apps) compatible with our system.

## 2025-02-14 NOTE — ED STATDOCS - PHYSICAL EXAMINATION
Constitutional: NAD AAOx3  Eyes: PERRLA EOMI  Head: Normocephalic atraumatic  Mouth: MMM  Cardiac: regular rate   Resp: mild respiratory wheeze, no distress, speaking full sentences   GI: Abd s/nt/nd  Neuro: grossly normal and intact  Skin: No visible rashes

## 2025-02-14 NOTE — ED STATDOCS - CLINICAL SUMMARY MEDICAL DECISION MAKING FREE TEXT BOX
X-rays performed and independently interpreted by myself reveal no acute findings.  Patient with a mild COPD exacerbation.  Patient given albuterol, prednisone department.  Patient states that she is also out of her clonidine, PA sent prescription to pharmacy.  Discharged home in good condition with supportive care.  Recommend close outpatient follow-up.  Strict return precaution given for any worsening.  Patient verbalized understanding and agreed to plan.

## 2025-02-14 NOTE — ED STATDOCS - PATIENT PORTAL LINK FT
You can access the FollowMyHealth Patient Portal offered by [facility] by registering at the following website: [email or web address]. By joining [facility]’s FollowMyHealth portal, you will also be able to view your health information using other applications (apps) compatible with our system.

## 2025-02-14 NOTE — ED ADULT NURSE NOTE - OBJECTIVE STATEMENT
pt with pmh asthma and COPD c/o SOB, worsening today after smoking a cigarette. took albuterol PTA with no relief.

## 2025-02-14 NOTE — ED STATDOCS - OBJECTIVE STATEMENT
59 year old w PMHx asthma presents to the ED c/o shortness of breath. Pt states it began after smoking a cigarette, states it may have been an asthma attack. Allergic to penicillin and percocet.. .3 Clonidine 3 times a day.

## 2025-02-14 NOTE — ED ADULT TRIAGE NOTE - CHIEF COMPLAINT QUOTE
Ambulatory to ER with c/o S.O.B and chest pain x 20 min, HX of COPD and asthma; Albuterol taken with no relief. Saturating 98% on room air; no respiratory distress noted. EKG completed in triage.

## 2025-02-16 ENCOUNTER — INPATIENT (INPATIENT)
Facility: HOSPITAL | Age: 60
LOS: 3 days | Discharge: HOME | DRG: 199 | End: 2025-02-20
Attending: STUDENT IN AN ORGANIZED HEALTH CARE EDUCATION/TRAINING PROGRAM | Admitting: HOSPITALIST
Payer: MEDICAID

## 2025-02-16 VITALS
SYSTOLIC BLOOD PRESSURE: 213 MMHG | OXYGEN SATURATION: 95 % | HEIGHT: 65 IN | TEMPERATURE: 99 F | RESPIRATION RATE: 18 BRPM | HEART RATE: 66 BPM | DIASTOLIC BLOOD PRESSURE: 110 MMHG | WEIGHT: 220.68 LBS

## 2025-02-16 DIAGNOSIS — I10 ESSENTIAL (PRIMARY) HYPERTENSION: ICD-10-CM

## 2025-02-16 LAB
ALBUMIN SERPL ELPH-MCNC: 3.6 G/DL — SIGNIFICANT CHANGE UP (ref 3.3–5)
ALP SERPL-CCNC: 94 U/L — SIGNIFICANT CHANGE UP (ref 40–120)
ALT FLD-CCNC: 29 U/L — SIGNIFICANT CHANGE UP (ref 12–78)
ANION GAP SERPL CALC-SCNC: 4 MMOL/L — LOW (ref 5–17)
APPEARANCE UR: CLEAR — SIGNIFICANT CHANGE UP
AST SERPL-CCNC: 14 U/L — LOW (ref 15–37)
BASOPHILS # BLD AUTO: 0.03 K/UL — SIGNIFICANT CHANGE UP (ref 0–0.2)
BASOPHILS NFR BLD AUTO: 0.4 % — SIGNIFICANT CHANGE UP (ref 0–2)
BILIRUB SERPL-MCNC: 0.4 MG/DL — SIGNIFICANT CHANGE UP (ref 0.2–1.2)
BILIRUB UR-MCNC: NEGATIVE — SIGNIFICANT CHANGE UP
BUN SERPL-MCNC: 17 MG/DL — SIGNIFICANT CHANGE UP (ref 7–23)
CALCIUM SERPL-MCNC: 8.9 MG/DL — SIGNIFICANT CHANGE UP (ref 8.5–10.1)
CHLORIDE SERPL-SCNC: 110 MMOL/L — HIGH (ref 96–108)
CO2 SERPL-SCNC: 30 MMOL/L — SIGNIFICANT CHANGE UP (ref 22–31)
COLOR SPEC: YELLOW — SIGNIFICANT CHANGE UP
CREAT SERPL-MCNC: 1.24 MG/DL — SIGNIFICANT CHANGE UP (ref 0.5–1.3)
DIFF PNL FLD: NEGATIVE — SIGNIFICANT CHANGE UP
EGFR: 50 ML/MIN/1.73M2 — LOW
EOSINOPHIL # BLD AUTO: 0.03 K/UL — SIGNIFICANT CHANGE UP (ref 0–0.5)
EOSINOPHIL NFR BLD AUTO: 0.4 % — SIGNIFICANT CHANGE UP (ref 0–6)
FLUAV AG NPH QL: DETECTED
FLUBV AG NPH QL: SIGNIFICANT CHANGE UP
GLUCOSE SERPL-MCNC: 84 MG/DL — SIGNIFICANT CHANGE UP (ref 70–99)
GLUCOSE UR QL: NEGATIVE MG/DL — SIGNIFICANT CHANGE UP
HCT VFR BLD CALC: 44.4 % — SIGNIFICANT CHANGE UP (ref 34.5–45)
HGB BLD-MCNC: 13.9 G/DL — SIGNIFICANT CHANGE UP (ref 11.5–15.5)
IMM GRANULOCYTES # BLD AUTO: 0.03 K/UL — SIGNIFICANT CHANGE UP (ref 0–0.07)
IMM GRANULOCYTES NFR BLD AUTO: 0.4 % — SIGNIFICANT CHANGE UP (ref 0–0.9)
KETONES UR-MCNC: NEGATIVE MG/DL — SIGNIFICANT CHANGE UP
LEUKOCYTE ESTERASE UR-ACNC: NEGATIVE — SIGNIFICANT CHANGE UP
LYMPHOCYTES # BLD AUTO: 1.2 K/UL — SIGNIFICANT CHANGE UP (ref 1–3.3)
LYMPHOCYTES NFR BLD AUTO: 17.8 % — SIGNIFICANT CHANGE UP (ref 13–44)
MCHC RBC-ENTMCNC: 29.4 PG — SIGNIFICANT CHANGE UP (ref 27–34)
MCHC RBC-ENTMCNC: 31.3 G/DL — LOW (ref 32–36)
MCV RBC AUTO: 94.1 FL — SIGNIFICANT CHANGE UP (ref 80–100)
MONOCYTES # BLD AUTO: 0.96 K/UL — HIGH (ref 0–0.9)
MONOCYTES NFR BLD AUTO: 14.3 % — HIGH (ref 2–14)
NEUTROPHILS # BLD AUTO: 4.48 K/UL — SIGNIFICANT CHANGE UP (ref 1.8–7.4)
NEUTROPHILS NFR BLD AUTO: 66.7 % — SIGNIFICANT CHANGE UP (ref 43–77)
NITRITE UR-MCNC: NEGATIVE — SIGNIFICANT CHANGE UP
NRBC # BLD AUTO: 0 K/UL — SIGNIFICANT CHANGE UP (ref 0–0)
NRBC # FLD: 0 K/UL — SIGNIFICANT CHANGE UP (ref 0–0)
NRBC BLD AUTO-RTO: 0 /100 WBCS — SIGNIFICANT CHANGE UP (ref 0–0)
NT-PROBNP SERPL-SCNC: 1505 PG/ML — HIGH (ref 0–125)
PH UR: 7.5 — SIGNIFICANT CHANGE UP (ref 5–8)
PLATELET # BLD AUTO: 181 K/UL — SIGNIFICANT CHANGE UP (ref 150–400)
PMV BLD: 12 FL — SIGNIFICANT CHANGE UP (ref 7–13)
POTASSIUM SERPL-MCNC: 3.6 MMOL/L — SIGNIFICANT CHANGE UP (ref 3.5–5.3)
POTASSIUM SERPL-SCNC: 3.6 MMOL/L — SIGNIFICANT CHANGE UP (ref 3.5–5.3)
PROT SERPL-MCNC: 7 GM/DL — SIGNIFICANT CHANGE UP (ref 6–8.3)
PROT UR-MCNC: NEGATIVE MG/DL — SIGNIFICANT CHANGE UP
RBC # BLD: 4.72 M/UL — SIGNIFICANT CHANGE UP (ref 3.8–5.2)
RBC # FLD: 13.9 % — SIGNIFICANT CHANGE UP (ref 10.3–14.5)
RSV RNA NPH QL NAA+NON-PROBE: SIGNIFICANT CHANGE UP
SARS-COV-2 RNA SPEC QL NAA+PROBE: SIGNIFICANT CHANGE UP
SODIUM SERPL-SCNC: 144 MMOL/L — SIGNIFICANT CHANGE UP (ref 135–145)
SP GR SPEC: 1.01 — SIGNIFICANT CHANGE UP (ref 1–1.03)
TROPONIN I, HIGH SENSITIVITY RESULT: 102.91 NG/L — HIGH
TROPONIN I, HIGH SENSITIVITY RESULT: 107.85 NG/L — HIGH
UROBILINOGEN FLD QL: 0.2 MG/DL — SIGNIFICANT CHANGE UP (ref 0.2–1)
WBC # BLD: 6.73 K/UL — SIGNIFICANT CHANGE UP (ref 3.8–10.5)
WBC # FLD AUTO: 6.73 K/UL — SIGNIFICANT CHANGE UP (ref 3.8–10.5)

## 2025-02-16 PROCEDURE — 70450 CT HEAD/BRAIN W/O DYE: CPT | Mod: 26

## 2025-02-16 PROCEDURE — 99223 1ST HOSP IP/OBS HIGH 75: CPT

## 2025-02-16 PROCEDURE — 93306 TTE W/DOPPLER COMPLETE: CPT

## 2025-02-16 PROCEDURE — 84484 ASSAY OF TROPONIN QUANT: CPT

## 2025-02-16 PROCEDURE — 71045 X-RAY EXAM CHEST 1 VIEW: CPT | Mod: 26

## 2025-02-16 PROCEDURE — 99285 EMERGENCY DEPT VISIT HI MDM: CPT

## 2025-02-16 PROCEDURE — 83036 HEMOGLOBIN GLYCOSYLATED A1C: CPT

## 2025-02-16 PROCEDURE — 36415 COLL VENOUS BLD VENIPUNCTURE: CPT

## 2025-02-16 PROCEDURE — 85027 COMPLETE CBC AUTOMATED: CPT

## 2025-02-16 PROCEDURE — 80048 BASIC METABOLIC PNL TOTAL CA: CPT

## 2025-02-16 PROCEDURE — 93005 ELECTROCARDIOGRAM TRACING: CPT

## 2025-02-16 PROCEDURE — 80061 LIPID PANEL: CPT

## 2025-02-16 RX ORDER — MELATONIN 5 MG
3 TABLET ORAL AT BEDTIME
Refills: 0 | Status: DISCONTINUED | OUTPATIENT
Start: 2025-02-16 | End: 2025-02-20

## 2025-02-16 RX ORDER — MONTELUKAST SODIUM 10 MG/1
10 TABLET ORAL DAILY
Refills: 0 | Status: DISCONTINUED | OUTPATIENT
Start: 2025-02-16 | End: 2025-02-20

## 2025-02-16 RX ORDER — MONTELUKAST SODIUM 10 MG/1
1 TABLET ORAL
Refills: 0 | DISCHARGE

## 2025-02-16 RX ORDER — ATORVASTATIN CALCIUM 80 MG/1
40 TABLET, FILM COATED ORAL AT BEDTIME
Refills: 0 | Status: DISCONTINUED | OUTPATIENT
Start: 2025-02-16 | End: 2025-02-16

## 2025-02-16 RX ORDER — ACETAMINOPHEN 500 MG/5ML
650 LIQUID (ML) ORAL EVERY 6 HOURS
Refills: 0 | Status: DISCONTINUED | OUTPATIENT
Start: 2025-02-16 | End: 2025-02-20

## 2025-02-16 RX ORDER — METOPROLOL SUCCINATE 50 MG/1
200 TABLET, EXTENDED RELEASE ORAL DAILY
Refills: 0 | Status: DISCONTINUED | OUTPATIENT
Start: 2025-02-16 | End: 2025-02-20

## 2025-02-16 RX ORDER — ENOXAPARIN SODIUM 100 MG/ML
40 INJECTION SUBCUTANEOUS EVERY 24 HOURS
Refills: 0 | Status: DISCONTINUED | OUTPATIENT
Start: 2025-02-16 | End: 2025-02-20

## 2025-02-16 RX ORDER — OSELTAMIVIR PHOSPHATE 75 MG/1
75 CAPSULE ORAL EVERY 12 HOURS
Refills: 0 | Status: DISCONTINUED | OUTPATIENT
Start: 2025-02-16 | End: 2025-02-20

## 2025-02-16 RX ORDER — ONDANSETRON HCL/PF 4 MG/2 ML
4 VIAL (ML) INJECTION EVERY 8 HOURS
Refills: 0 | Status: DISCONTINUED | OUTPATIENT
Start: 2025-02-16 | End: 2025-02-20

## 2025-02-16 RX ORDER — FUROSEMIDE 10 MG/ML
40 INJECTION INTRAMUSCULAR; INTRAVENOUS ONCE
Refills: 0 | Status: COMPLETED | OUTPATIENT
Start: 2025-02-16 | End: 2025-02-16

## 2025-02-16 RX ORDER — MAGNESIUM, ALUMINUM HYDROXIDE 200-200 MG
30 TABLET,CHEWABLE ORAL EVERY 4 HOURS
Refills: 0 | Status: DISCONTINUED | OUTPATIENT
Start: 2025-02-16 | End: 2025-02-20

## 2025-02-16 RX ORDER — ROSUVASTATIN CALCIUM 20 MG/1
1 TABLET, FILM COATED ORAL
Refills: 0 | DISCHARGE

## 2025-02-16 RX ORDER — IPRATROPIUM BROMIDE AND ALBUTEROL SULFATE .5; 2.5 MG/3ML; MG/3ML
3 SOLUTION RESPIRATORY (INHALATION) ONCE
Refills: 0 | Status: COMPLETED | OUTPATIENT
Start: 2025-02-16 | End: 2025-02-16

## 2025-02-16 RX ORDER — ENALAPRIL MALEATE 20 MG
1.25 TABLET ORAL EVERY 6 HOURS
Refills: 0 | Status: DISCONTINUED | OUTPATIENT
Start: 2025-02-16 | End: 2025-02-20

## 2025-02-16 RX ORDER — ROSUVASTATIN CALCIUM 20 MG/1
10 TABLET, FILM COATED ORAL AT BEDTIME
Refills: 0 | Status: DISCONTINUED | OUTPATIENT
Start: 2025-02-16 | End: 2025-02-20

## 2025-02-16 RX ORDER — ASPIRIN 325 MG
81 TABLET ORAL DAILY
Refills: 0 | Status: DISCONTINUED | OUTPATIENT
Start: 2025-02-16 | End: 2025-02-20

## 2025-02-16 RX ADMIN — OSELTAMIVIR PHOSPHATE 75 MILLIGRAM(S): 75 CAPSULE ORAL at 21:20

## 2025-02-16 RX ADMIN — Medication 0.2 MILLIGRAM(S): at 12:17

## 2025-02-16 RX ADMIN — Medication 0.1 MILLIGRAM(S): at 12:17

## 2025-02-16 RX ADMIN — IPRATROPIUM BROMIDE AND ALBUTEROL SULFATE 3 MILLILITER(S): .5; 2.5 SOLUTION RESPIRATORY (INHALATION) at 11:45

## 2025-02-16 RX ADMIN — Medication 0.3 MILLIGRAM(S): at 21:14

## 2025-02-16 RX ADMIN — FUROSEMIDE 40 MILLIGRAM(S): 10 INJECTION INTRAMUSCULAR; INTRAVENOUS at 12:56

## 2025-02-16 RX ADMIN — ENOXAPARIN SODIUM 40 MILLIGRAM(S): 100 INJECTION SUBCUTANEOUS at 21:14

## 2025-02-16 RX ADMIN — Medication 650 MILLIGRAM(S): at 16:13

## 2025-02-16 RX ADMIN — Medication 10 MILLIGRAM(S): at 11:13

## 2025-02-16 RX ADMIN — Medication 3 MILLILITER(S): at 11:09

## 2025-02-16 RX ADMIN — Medication 3 MILLIGRAM(S): at 21:15

## 2025-02-16 RX ADMIN — ROSUVASTATIN CALCIUM 10 MILLIGRAM(S): 20 TABLET, FILM COATED ORAL at 21:14

## 2025-02-16 NOTE — H&P ADULT - NSHPLABSRESULTS_GEN_ALL_CORE
LABS: All Labs Reviewed:                        13.9   6.73  )-----------( 181      ( 16 Feb 2025 10:37 )             44.4     02-16    144  |  110[H]  |  17  ----------------------------<  84  3.6   |  30  |  1.24    Ca    8.9      16 Feb 2025 10:37    TPro  7.0  /  Alb  3.6  /  TBili  0.4  /  DBili  x   /  AST  14[L]  /  ALT  29  /  AlkPhos  94  02-16              Blood Culture:

## 2025-02-16 NOTE — ED PROVIDER NOTE - OBJECTIVE STATEMENT
59-year-old AA F, PMH of COPD, HTN on clonidine/verapamil/HCTZ; ambulatory to ED complaining of high BP at home (204/130 BP reading) with associated headache, nausea, lightheaded, FRYE this a.m.  No chest pain nor SOB at rest.  Headache bifrontal throbbing, mild, no associated photophobia.  No associated F/C, vision/speech/swallow changes, focal face/extremities weak/numb/tingling, gait abnormality nor LOC.  Patient ran out of her clonidine 8 days ago and unable to renew since her PCP is out of office this past week on vacation.  PCP: Sheba 59-year-old AA F, PMH of COPD, HTN on clonidine/verapamil/HCTZ; ambulatory to ED complaining of high BP at home (204/130 BP reading) with associated headache, nausea, lightheaded, & FRYE this a.m.  No chest pain nor SOB at rest.  Headache: bifrontal throbbing, mild, no associated photophobia.  No associated F/C, vision/speech/swallow changes, focal face/extremities weak/numb/tingling, gait abnormality nor LOC.  Patient ran out of her clonidine 8 days ago and unable to renew since her PCP is out of office this past week on vacation.  PCP: Sheba

## 2025-02-16 NOTE — ED ADULT NURSE NOTE - CCCP TRG CHIEF CMPLNT
Palliative Care Progress Note


Assessment/Plan: 


Referring provider: Sherry Tamayo 





Reason for consult:


Complex medical decision making


Symptom control





HPI:


Emiliano Miner is a 78 yo with PMH dementia, COPD, BPH, has been deaf and mute 

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 

Found to have possible aspiration PNA being treated with IV fluids and 

antibiotics. Speech seen and VFSS with moderate dysphagia rec nectar thick and 

pureed diet. Hospitalization complicated by bradycardia and tachycardia, emesis 

without known cause, as well as severe urinary obstruction 2/2 BPH s/p eddy 

insertion. Recent fall with wrist fracture prior to hospitalization. palliative 

care consulted for complex medical decision making. 





Met with daughter Earlene, granddaughter and her boyfriend outside of the room. Earlene 

shared her dad began to decline in 2015 when he had a suicide attempt and was 

hospitalized for 10 weeks. This was very unusual for him as he always had a "go 

getter" and positive attitude throughout his life. Since then she has been 

caring for him at his home and at times at her home. She has noticed over the 

past 3 years a decline in his cognition and decline in status. He was placed 

into a nursing home when he needed more care then could be provided at home. 

Per daughter she has noticed the decline more dramatically since then losing a 

large amount of weight, not getting out of bed, coughing with liquids, and not 

being interested in activities. She describes him as a very intelligent person 

who was always building something or trying to break a barrier. He has been 

deaf since the age of 1 and started many program and had many accomplishments 

that were not "normal" for a deaf person to overcome. She states she has had 

many conversations with her father about his end of life wishes and he had 

never wanted to extend a poor quality of life. She states he always was very 

independent and valued his intellect. She does not feel like his current status 

is consistent with a good quality of life for him and states "if he were able to

, he would not want to live like this". We discussed comfort care and hospice 

care as well as code status. She would like him to be as comfortable as 

possible for whatever time he has left. She prefers him to not return back to 

[facility] and is interested in [facility] care. 





Assessment:


Physical:


- Pain: wrist pain


   - tylenol PRN


   - splint as tolerated


   - morphine 2mg IV PRN





- Dysphagia:   


   - speech following


   - aspiration precautions





- Nausea/vomiting: yesterday with large emesis


   - zofran PRN





Emotional/psychological:


Acute encephalopathy:


   - maintain normal routines


   - haldol if severe agitation





Advanced Care Planning:


   Is patient decisional?: No


   Code Status: DNR/DNI- confirmed with daughter today


   MD RASMUSSEN: Daughter Earlene is proxy 





Plan: Family feels Emiliano would not want life prolongation in his current 

state. They would like to make sure he is comfortable and are interested in 

hospice care. [facility] per family request for possible GIP. 





01/12/18 14:39





Subjective: 


sleeping, opens eyes to name





Objective: 


Social History: . Has 1 daughter local and involved. Used to enjoy 

making things especially model steam trains. Also was a part of a deaf 

motorcycle club. 





Medication list reviewed 





ROS: 


General: fatigue, weakness, weight loss


ENT: dysphagia


Resp: negative


GI: poor appetite, nausea


: urinary retention


MS: wrist pain


Skin: negative


Neuro: negative


Psych: agitation, confusion





Functional assessment:


   PPS: 30%


   Functional status:  dependent on ADLs, IADLs 





Vital Signs











Temp Pulse Resp BP Pulse Ox


 


 36.4 C   83   18   120/64   99 


 


 01/12/18 12:00  01/12/18 12:00  01/12/18 12:00  01/12/18 12:00  01/12/18 12:00








 Laboratory Results





 01/10/18 05:44 





 01/12/18 04:14 





 











 01/11/18 01/12/18 01/13/18





 05:59 05:59 05:59


 


Intake Total 118 520 


 


Output Total  501 


 


Balance 118 19 














Physical Exam





- Physical Exam


General Appearance: no apparent distress, other (sleeping)


Skin: normal color, warm/dry


Extremities: No pedal edema


Neuro/Psych: other (awakens to name, does not engage in conversation)





ICD10 Worksheet


Patient Problems: 


 Problems











Problem Status Onset


 


Left wrist fracture Acute  


 


Palliative care encounter Acute  


 


Pneumonia Acute  


 


TUSHAR (acute kidney injury) Acute  


 


Fall Acute  


 


Generalized weakness Acute  


 


Pyelonephritis, acute Acute  














- ICD10 Problem Qualifiers


(1) Palliative care encounter hypertension

## 2025-02-16 NOTE — ED PROVIDER NOTE - CARE PLAN
Principal Discharge DX:	Uncontrolled hypertension  Secondary Diagnosis:	Troponin I above reference range  Secondary Diagnosis:	Influenza   1

## 2025-02-16 NOTE — ED PROVIDER NOTE - CLINICAL SUMMARY MEDICAL DECISION MAKING FREE TEXT BOX
Pt neuro intact, + high BP in ED.   Clinical concern for hypertensive urgency, rule out ACS, hypertensive encephalopathy, though not likely.  Plan:  EKG, CT head, labs incl. Troponin, BNP, U/A; IV hydralazine, serial BP checks.  Monitor, observe, reassess. Pt neuro intact, + high BP in ED.   Clinical concern for hypertensive urgency, rule out ACS, hypertensive encephalopathy, though not likely.  Plan:  EKG, CT head, labs incl. Troponin, BNP, U/A; IV hydralazine, serial BP checks.  Monitor, observe, reassess.    14:25:  Inadequate BP control s/p IV Hydralazine, eventual control after po Clonidine 0.3 mg, /86.  CXR wet read negative.  CT head report no acute IC pathology.  Labs notable for Flu A+. Elevated Troponin. Case d/w Dr. Caldwell & Tele admit accepted. 59-year-old AA F, PMH of COPD, HTN on clonidine/verapamil/HCTZ; ambulatory to ED complaining of high BP at home (204/130 BP reading) with associated headache, nausea, lightheaded, & FRYE this a.m.  No chest pain nor SOB at rest.  Patient ran out of her clonidine 8 days ago and unable to renew since her PCP is out of office this past week on vacation.    Pt neuro intact, + high BP in ED.   Clinical concern includes: hypertensive urgency, rule out ACS, hypertensive encephalopathy, though not likely.  Plan:  EKG, CT head, labs incl. Troponin, BNP, U/A; IV hydralazine, serial BP checks.  Monitor, observe, reassess.    14:25:  Inadequate BP control s/p IV Hydralazine, eventual control after po Clonidine 0.3 mg, /86.  CXR wet read negative.  CT head report no acute IC pathology.  Labs notable for Flu A+ & elevated Troponin. Case d/w Dr. Caldwell & Tele admit accepted.

## 2025-02-16 NOTE — ED ADULT NURSE REASSESSMENT NOTE - NS ED NURSE REASSESS COMMENT FT1
After initial refusal of Lasix, pt now stating she will accept medication. MD Mccurdy aware. Verbal order to re-order 40mg IVP Lasix. Pt stating she will allow another bp before lasix administration. Pt set up on primafit.

## 2025-02-16 NOTE — ED ADULT NURSE REASSESSMENT NOTE - NS ED NURSE REASSESS COMMENT FT1
Primary RN came to [facility].  As per RN Judith, pt attempted to leave w/ IV in place. Upon primary RN arrival, pt sitting in chair w/ cardiac leads on the ground. Pt stating, "I am having difficulty breathing, you are poisoning me. I am not taking any other medications besides my own." Pt refusing to lay back in stretcher, place SpO2 on finger or blood pressure cuff back on arm. pt educated on importance of monitoring bp due to her hypertension and spo2 due to sob. Pt continues to refuse and yell at staff. pt offered home medication clonidine. Pt states she will take her clonidine but refusing other medications to help sob. Pt within sight of RN station. MD Continleroy and Charge RN aware.

## 2025-02-16 NOTE — ED ADULT NURSE REASSESSMENT NOTE - NS ED NURSE REASSESS COMMENT FT1
Pt screaming and cursing stating she cannot breathe. Primary RN at bedside. Pt 100% on ra w/ no acute distress noted. Pt states she feels like she needs an albuterol txt. MD Contino aware. Verbal order for 1 time dose of duoneb and flu swab. verified order twice w/ MD. Safety and comfort maintained.

## 2025-02-16 NOTE — H&P ADULT - HISTORY OF PRESENT ILLNESS
"59-year-old AA F, PMH of COPD, HTN on clonidine/verapamil/HCTZ; ambulatory to ED complaining of high BP at home (204/130 BP reading) with associated headache, nausea, lightheaded, FRYE this a.m.  No chest pain nor SOB at rest.  Headache bifrontal throbbing, mild, no associated photophobia.  No associated F/C, vision/speech/swallow changes, focal face/extremities weak/numb/tingling, gait abnormality nor LOC.  Patient ran out of her clonidine 8 days ago and unable to renew since her PCP is out of office this past week on vacation.  PCP: Sheba"      59 F from Shelter p/w CARDONA and elevated BP after she ran out of her meds > 8 days ago. HA is frontal, no assoc bluury vision, rhinorrhea , Nausea or vomiting. +flu like symptoms x 18 hours. She has no other complaints to offer at this time    ED course: BP on arrival 204/130: give lasix, hydralazine and clonidine, bp now 168/86, HR 70s. Vandana panchal. , EKG: NSR  CTH: wnl  CXR: negative  Pt is an everyday smoker  Denies drug or etoh use        Social: as above, smoker 1 pack every 2 weeks   Shx: none  Fhx: unknown

## 2025-02-16 NOTE — PATIENT PROFILE ADULT - FALL HARM RISK - HARM RISK INTERVENTIONS

## 2025-02-16 NOTE — ED ADULT TRIAGE NOTE - AS TEMP SITE
Spoke with pt and advised patient that per dr galindo, patient should increase his losartan dose to 50 mg once daily and keep log of home bp readings, pt verbalized an understanding.    oral

## 2025-02-16 NOTE — H&P ADULT - ASSESSMENT
"59-year-old AA F, PMH of COPD, HTN on clonidine/verapamil/HCTZ; ambulatory to ED complaining of high BP at home (204/130 BP reading) with associated headache, nausea, lightheaded, FRYE this a.m.  No chest pain nor SOB at rest.  Headache bifrontal throbbing, mild, no associated photophobia.  No associated F/C, vision/speech/swallow changes, focal face/extremities weak/numb/tingling, gait abnormality nor LOC.  Patient ran out of her clonidine 8 days ago and unable to renew since her PCP is out of office this past week on vacation.  PCP: Sheba"      59 F from Shelter p/w CARDONA and elevated BP after she ran out of her meds > 8 days ago. HA is frontal, no assoc bluury vision, rhinorrhea , Nausea or vomiting. +flu like symptoms x 18 hours. She has no other complaints to offer at this time    E course: BP on arrival 204/130: give lasix, hydralazine and clonidine, bp now 168/86, HR 70s. Deneis cp. , EKG: NSR  CTH: wnl  CXR: negative  Pt is an everyday smoker  Denies drug or etoh use    #HTN emergency  #Influenza A  - admit to tele  - bp 168/86  - 25% reduction in arrival bp over next 24 hours  - vasotec iv prn for refractory bps  - HA/resolved, denies cp despite slight elevation tin trop which is likely demand from above  - 2D echo  - ASA + statin  - resume home meds  - start tamiflu x 5 days  - isolation precautions  - smoking cessation education/nicotine patch offered  - DVT px

## 2025-02-16 NOTE — H&P ADULT - NSHPPHYSICALEXAM_GEN_ALL_CORE
ICU Vital Signs Last 24 Hrs  T(C): 37.2 (16 Feb 2025 13:48), Max: 37.3 (16 Feb 2025 10:00)  T(F): 99 (16 Feb 2025 13:48), Max: 99.1 (16 Feb 2025 10:00)  HR: 70 (16 Feb 2025 13:48) (66 - 75)  BP: 168/86 (16 Feb 2025 13:48) (138/108 - 228/112)  BP(mean): 111 (16 Feb 2025 13:48) (111 - 146)  ABP: --  ABP(mean): --  RR: 18 (16 Feb 2025 13:48) (18 - 18)  SpO2: 99% (16 Feb 2025 13:48) (95% - 100%)    O2 Parameters below as of 16 Feb 2025 13:48  Patient On (Oxygen Delivery Method): room air            PHYSICAL EXAM:    Constitutional: NAD, awake and alert  HEENT: PERR, EOMI, Normal Hearing, MMM. L eye medial strabismus  Neck: Soft and supple, No LAD, No JVD  Respiratory: Breath sounds are clear bilaterally, No wheezing, rales or rhonchi  Cardiovascular: S1 and S2, regular rate and rhythm, no Murmurs, gallops or rubs  Gastrointestinal: Bowel Sounds present, soft, nontender, nondistended, no guarding, no rebound  Extremities: No peripheral edema  Vascular: 2+ peripheral pulses  Neurological: A/O x 3, no focal deficits  Musculoskeletal: 5/5 strength b/l upper and lower extremities  Skin: No rashes

## 2025-02-16 NOTE — ED PROVIDER NOTE - PHYSICAL EXAMINATION
Gen'l: Overweight older AA F adult in NAD, no respiratory discomfort, no sentence shortening, not acutely ill.  Head: NC/AT  Eyes:  L pupil chr. irregular s/p past trauma, + EOMI  ENT: O/P clear, mmm  CV: RRR, normal radial pulse  Lungs: CTA, normal respirations  GI: soft, NT, BS+  : Deferred, no flank nor CVAT  Neck: NT, supple w/o pain, no stiffness nor meningismus  MSK: DUNAWAY x 4, no focal extremity swelling nor tenderness, B/L SLR 35 degrees w/o pain, normal motor  Skin: no tactile warmth, no rash  Neuro: A+O x 4, CN 2 -12 intact, normal speech, no focal motor/sensory deficits

## 2025-02-16 NOTE — ED ADULT NURSE NOTE - OBJECTIVE STATEMENT
Pt presents to ED AOx3 from [facility] c/o headache, lightheadedness and SOB x 1 week. Pt states she has not been able to take her Clonidine x 3 days due to being unable to fill the prescription. Pt denies chest pain, n/v/d, fevers or sick contacts. PMhx HTN and COPD.

## 2025-02-16 NOTE — ED ADULT TRIAGE NOTE - CHIEF COMPLAINT QUOTE
pt presents to Ed with complaints of hypertension. endorses "I ran out of my clonidine about a week ago.  I started feeling nauseas, a headache, and light headed this morning." reports BP at home was 204/130.

## 2025-02-17 ENCOUNTER — RESULT REVIEW (OUTPATIENT)
Age: 60
End: 2025-02-17

## 2025-02-17 DIAGNOSIS — R79.89 OTHER SPECIFIED ABNORMAL FINDINGS OF BLOOD CHEMISTRY: ICD-10-CM

## 2025-02-17 DIAGNOSIS — E78.5 HYPERLIPIDEMIA, UNSPECIFIED: ICD-10-CM

## 2025-02-17 DIAGNOSIS — R06.02 SHORTNESS OF BREATH: ICD-10-CM

## 2025-02-17 DIAGNOSIS — I10 ESSENTIAL (PRIMARY) HYPERTENSION: ICD-10-CM

## 2025-02-17 LAB
A1C WITH ESTIMATED AVERAGE GLUCOSE RESULT: 6.4 % — HIGH (ref 4–5.6)
ANION GAP SERPL CALC-SCNC: 4 MMOL/L — LOW (ref 5–17)
BUN SERPL-MCNC: 15 MG/DL — SIGNIFICANT CHANGE UP (ref 7–23)
CALCIUM SERPL-MCNC: 8.5 MG/DL — SIGNIFICANT CHANGE UP (ref 8.5–10.1)
CHLORIDE SERPL-SCNC: 106 MMOL/L — SIGNIFICANT CHANGE UP (ref 96–108)
CHOLEST SERPL-MCNC: 134 MG/DL — SIGNIFICANT CHANGE UP
CO2 SERPL-SCNC: 31 MMOL/L — SIGNIFICANT CHANGE UP (ref 22–31)
CREAT SERPL-MCNC: 1.25 MG/DL — SIGNIFICANT CHANGE UP (ref 0.5–1.3)
EGFR: 49 ML/MIN/1.73M2 — LOW
ESTIMATED AVERAGE GLUCOSE: 137 MG/DL — HIGH (ref 68–114)
GLUCOSE SERPL-MCNC: 122 MG/DL — HIGH (ref 70–99)
HCT VFR BLD CALC: 41.1 % — SIGNIFICANT CHANGE UP (ref 34.5–45)
HDLC SERPL-MCNC: 50 MG/DL — LOW
HGB BLD-MCNC: 13.2 G/DL — SIGNIFICANT CHANGE UP (ref 11.5–15.5)
LIPID PNL WITH DIRECT LDL SERPL: 66 MG/DL — SIGNIFICANT CHANGE UP
MCHC RBC-ENTMCNC: 29.4 PG — SIGNIFICANT CHANGE UP (ref 27–34)
MCHC RBC-ENTMCNC: 32.1 G/DL — SIGNIFICANT CHANGE UP (ref 32–36)
MCV RBC AUTO: 91.5 FL — SIGNIFICANT CHANGE UP (ref 80–100)
NON HDL CHOLESTEROL: 84 MG/DL — SIGNIFICANT CHANGE UP
NRBC # BLD AUTO: 0 K/UL — SIGNIFICANT CHANGE UP (ref 0–0)
NRBC # FLD: 0 K/UL — SIGNIFICANT CHANGE UP (ref 0–0)
NRBC BLD AUTO-RTO: 0 /100 WBCS — SIGNIFICANT CHANGE UP (ref 0–0)
PLATELET # BLD AUTO: 174 K/UL — SIGNIFICANT CHANGE UP (ref 150–400)
PMV BLD: 11.6 FL — SIGNIFICANT CHANGE UP (ref 7–13)
POTASSIUM SERPL-MCNC: 3.6 MMOL/L — SIGNIFICANT CHANGE UP (ref 3.5–5.3)
POTASSIUM SERPL-SCNC: 3.6 MMOL/L — SIGNIFICANT CHANGE UP (ref 3.5–5.3)
RBC # BLD: 4.49 M/UL — SIGNIFICANT CHANGE UP (ref 3.8–5.2)
RBC # FLD: 14 % — SIGNIFICANT CHANGE UP (ref 10.3–14.5)
SODIUM SERPL-SCNC: 141 MMOL/L — SIGNIFICANT CHANGE UP (ref 135–145)
TRIGL SERPL-MCNC: 93 MG/DL — SIGNIFICANT CHANGE UP
TROPONIN I, HIGH SENSITIVITY RESULT: 102.04 NG/L — HIGH
TROPONIN I, HIGH SENSITIVITY RESULT: 102.81 NG/L — HIGH
WBC # BLD: 5.73 K/UL — SIGNIFICANT CHANGE UP (ref 3.8–10.5)
WBC # FLD AUTO: 5.73 K/UL — SIGNIFICANT CHANGE UP (ref 3.8–10.5)

## 2025-02-17 PROCEDURE — 99223 1ST HOSP IP/OBS HIGH 75: CPT

## 2025-02-17 PROCEDURE — 99233 SBSQ HOSP IP/OBS HIGH 50: CPT

## 2025-02-17 PROCEDURE — 93010 ELECTROCARDIOGRAM REPORT: CPT

## 2025-02-17 PROCEDURE — 93306 TTE W/DOPPLER COMPLETE: CPT | Mod: 26

## 2025-02-17 RX ADMIN — Medication 320 MILLIGRAM(S): at 08:38

## 2025-02-17 RX ADMIN — Medication 0.3 MILLIGRAM(S): at 22:12

## 2025-02-17 RX ADMIN — OSELTAMIVIR PHOSPHATE 75 MILLIGRAM(S): 75 CAPSULE ORAL at 22:14

## 2025-02-17 RX ADMIN — Medication 0.3 MILLIGRAM(S): at 04:51

## 2025-02-17 RX ADMIN — ROSUVASTATIN CALCIUM 10 MILLIGRAM(S): 20 TABLET, FILM COATED ORAL at 22:13

## 2025-02-17 RX ADMIN — Medication 650 MILLIGRAM(S): at 09:30

## 2025-02-17 RX ADMIN — Medication 81 MILLIGRAM(S): at 08:38

## 2025-02-17 RX ADMIN — METOPROLOL SUCCINATE 200 MILLIGRAM(S): 50 TABLET, EXTENDED RELEASE ORAL at 08:37

## 2025-02-17 RX ADMIN — Medication 650 MILLIGRAM(S): at 09:25

## 2025-02-17 RX ADMIN — OSELTAMIVIR PHOSPHATE 75 MILLIGRAM(S): 75 CAPSULE ORAL at 08:38

## 2025-02-17 RX ADMIN — Medication 0.3 MILLIGRAM(S): at 13:04

## 2025-02-17 RX ADMIN — MONTELUKAST SODIUM 10 MILLIGRAM(S): 10 TABLET ORAL at 08:38

## 2025-02-17 RX ADMIN — Medication 360 MILLIGRAM(S): at 08:38

## 2025-02-17 NOTE — CONSULT NOTE ADULT - PROBLEM SELECTOR RECOMMENDATION 2
as described above , possible demand related in setting of flu , copd , uncontrolled hypertension hypertensive heart disease , follow up echo ,

## 2025-02-17 NOTE — CONSULT NOTE ADULT - PROBLEM SELECTOR RECOMMENDATION 9
patient with above hx COPD / HTN with worsening of shortness of breath with positive flu tests with wheezing , possible exacerbation of COPD , possible component of diastolic dysfunction with associated underlying hypertensive heart disease with uncontrolled hypertension , minimal elevated troponin with flat trend possible demand related ischemia , less likely ACS     recommend ecotrin 81 mg po daily , statin , BB  , check echo ,     stress test once her pulmonary symptoms improves

## 2025-02-17 NOTE — DISCHARGE NOTE NURSING/CASE MANAGEMENT/SOCIAL WORK - NSDCFUADDAPPT_GEN_ALL_CORE_FT
TUESDAY 2/25/25  AT 10:15 AM  [facility] [phone]  [address] [place] [address] [address] [place], [id number]    WEDNESDAY 2/26/25 AT 9:00AM    HEALTH [facility]

## 2025-02-17 NOTE — CONSULT NOTE ADULT - SUBJECTIVE AND OBJECTIVE BOX
REASON FOR CONSULT: was having     CHIEF COMPLAINT:    HPI:  "59-year-old AA F, PMH of COPD, HTN on clonidine/verapamil/HCTZ; ambulatory to ED complaining of high BP at home (204/130 BP reading) with associated headache, nausea, lightheaded, FRYE yesterday AM .  No chest pain nor SOB at rest.  Headache bifrontal throbbing, mild, no associated photophobia.  No associated F/C, vision/speech/swallow changes, focal face/extremities weak/numb/tingling, gait abnormality nor LOC.  Patient ran out of her clonidine 8 days ago and unable to renew since her PCP is out of office this past week on vacation.  PCP: Sheba"      59 F from Shelter p/w CARDONA and elevated BP after she ran out of her meds > 8 days ago. HA is frontal, no assoc bluury vision, rhinorrhea , Nausea or vomiting. +flu like symptoms x 18 hours. She has no other complaints to offer at this time    ED course: BP on arrival 204/130: give lasix, hydralazine and clonidine, bp now 168/86, HR 70s. Vandana panchal. , EKG: NSR  CTH: wnl  CXR: negative  Pt is an everyday smoker  Denies drug or etoh use    called for cardiology , patient says she ran out of the medication , was having dizziness episodes while standing with mild headaches ,was having episodes of shortness of breath on activity without chest pain , with wheezing , patient blood pressure was markedly elevated , patient had positive for flu , blood works showed mild elevated troponin with flat trend ,  Patient says she was taking inhaler as prescribed    patient says she did have cardiac cath in 2019 was told to be normal , patient does not see any cardiologist  , patient lives in shelter       PAST MEDICAL & SURGICAL HISTORY:      Allergies    penicillin (Pruritus)  Tomatoes (Hives)  Percocet (Pruritus)  hydrALAZINE (Short breath)  Norvasc (Other; Headache)    Intolerances            Social History:  active smoker from age of 7 years     FAMILY HISTORY:  mother sister brother had HTN     MEDICATIONS:  MEDICATIONS  (STANDING):  aspirin  chewable 81 milliGRAM(s) Oral daily  cloNIDine 0.3 milliGRAM(s) Oral three times a day  enoxaparin Injectable 40 milliGRAM(s) SubCutaneous every 24 hours  metoprolol succinate  milliGRAM(s) Oral daily  montelukast 10 milliGRAM(s) Oral daily  oseltamivir 75 milliGRAM(s) Oral every 12 hours  rosuvastatin 10 milliGRAM(s) Oral at bedtime  valsartan 320 milliGRAM(s) Oral daily  verapamil  milliGRAM(s) Oral daily    MEDICATIONS  (PRN):  acetaminophen     Tablet .. 650 milliGRAM(s) Oral every 6 hours PRN Temp greater or equal to 38C (100.4F)  aluminum hydroxide/magnesium hydroxide/simethicone Suspension 30 milliLiter(s) Oral every 4 hours PRN Dyspepsia  enalaprilat Injectable 1.25 milliGRAM(s) IV Push every 6 hours PRN sbp> 160mmhg  melatonin 3 milliGRAM(s) Oral at bedtime PRN Insomnia  ondansetron Injectable 4 milliGRAM(s) IV Push every 8 hours PRN Nausea and/or Vomiting      REVIEW OF SYSTEMS:    CONSTITUTIONAL: No weakness, fevers or chills  EYES/ENT: No visual changes;  No vertigo or throat pain   NECK: No pain or stiffness  RESPIRATORY: + cough, wheezing, shortness of breath  CARDIOVASCULAR: No chest pain or palpitations  GASTROINTESTINAL: No abdominal or epigastric pain. No nausea, vomiting, or hematemesis; No diarrhea or constipation. No melena or hematochezia.  GENITOURINARY: No dysuria, frequency or hematuria  NEUROLOGICAL: No numbness or weakness  SKIN: No itching, burning, rashes, or lesions   All other review of systems is negative unless indicated above    Vital Signs Last 24 Hrs  T(C): 38.4 (17 Feb 2025 08:19), Max: 38.4 (17 Feb 2025 08:19)  T(F): 101.2 (17 Feb 2025 08:19), Max: 101.2 (17 Feb 2025 08:19)  HR: 70 (17 Feb 2025 08:19) (66 - 75)  BP: 160/99 (17 Feb 2025 08:19) (138/108 - 228/112)  BP(mean): 114 (17 Feb 2025 08:19) (107 - 146)  RR: 18 (17 Feb 2025 08:19) (18 - 18)  SpO2: 92% (17 Feb 2025 08:19) (92% - 100%)    Parameters below as of 17 Feb 2025 08:19  Patient On (Oxygen Delivery Method): room air        I&O's Summary    16 Feb 2025 07:01  -  17 Feb 2025 07:00  --------------------------------------------------------  IN: 0 mL / OUT: 800 mL / NET: -800 mL        PHYSICAL EXAM:    Constitutional: NAD, awake and alert, well-developed  HEENT: PERR, EOMI,  No oral cyananosis.  Neck:  supple,  No JVD  Respiratory: Breath sounds are  equal with mild expiratory wheezing present   Cardiovascular: S1 and S2, regular rate and rhythm, no Murmurs, gallops or rubs  Gastrointestinal: Bowel Sounds present, soft, nontender.   Extremities: No peripheral edema. No clubbing or cyanosis.  Vascular: 2+ peripheral pulses  Neurological: A/O x 3, no focal deficits  Musculoskeletal: no calf tenderness.  Skin: No rashes.      LABS: All Labs Reviewed:                        13.2   5.73  )-----------( 174      ( 17 Feb 2025 06:25 )             41.1                         13.9   6.73  )-----------( 181      ( 16 Feb 2025 10:37 )             44.4     17 Feb 2025 06:25    141    |  106    |  15     ----------------------------<  122    3.6     |  31     |  1.25   16 Feb 2025 10:37    144    |  110    |  17     ----------------------------<  84     3.6     |  30     |  1.24     Ca    8.5        17 Feb 2025 06:25  Ca    8.9        16 Feb 2025 10:37    TPro  7.0    /  Alb  3.6    /  TBili  0.4    /  DBili  x      /  AST  14     /  ALT  29     /  AlkPhos  94     16 Feb 2025 10:37      bPro-Brain Natriuretic Peptide: 1505 pg/mL (02.16.25 @ 10:37)       Blood Culture:     - TroponinI hsT: <-102.04, <-102.91, <-107.85    RADIOLOGY/EKG:  < from: Xray Chest 1 View- PORTABLE-Urgent (Xray Chest 1 View- PORTABLE-Urgent .) (02.16.25 @ 12:54) >  IMPRESSION: No focal consolidation is seen.    < end of copied text >  < from: Xray Chest 2 Views PA/Lat (02.14.25 @ 21:54) >  RESSION:  No focal consolidation is seen.    < end of copied text >  < from: 12 Lead ECG (02.16.25 @ 10:03) >  Diagnosis Line Sinus rhythm withPremature atrial complexes  Possible Left atrial enlargement  Minimal voltage criteria for LVH, may be normal variant ( [place] product )  T wave abnormality, consider lateral ischemia  Abnormal ECG  When compared with ECG of 14-FEB-2025 21:25,  Premature atrial complexes are now Present  Confirmed by ALEN JERONIMO ([id number]) on 2/16/2025 2:19:32 PM    < end of copied text >  < from: 12 Lead ECG (02.14.25 @ 21:25) >  Diagnosis Line Normal sinus rhythm  Possible Left atrial enlargement  Minimal voltage criteria for LVH, may be normal variant ( [place] product )  T wave abnormality, consider lateral ischemia  Abnormal ECG  No previous ECGs available  Confirmed by ALEN JERONIMO ([id number]) on 2/16/2025 12:17:23 AM    < end of copied text >

## 2025-02-17 NOTE — DISCHARGE NOTE NURSING/CASE MANAGEMENT/SOCIAL WORK - NSDCPNINST_GEN_ALL_CORE
Thank you for choosing [facility]. It has been our pleasure taking care of you. Please let us know if you have any questions, concerns or needs. For a complete copy of medical records please visit : [email or web address]

## 2025-02-17 NOTE — PROGRESS NOTE ADULT - SUBJECTIVE AND OBJECTIVE BOX
HOSPITALIST ATTENDING PROGRESS NOTE    Chart and meds reviewed.      Subjective: Patient seen and examined. Resting comfrotably. SPO2  94% on room air. /79. Denies cehst pain or shortness of breath at this time, Echo ordered       Additional results/Imaging, I have personally reviewed:    LABS:                            13.2   5.73  )-----------( 174      ( 17 Feb 2025 06:25 )             41.1     02-17    141  |  106  |  15  ----------------------------<  122[H]  3.6   |  31  |  1.25    Ca    8.5      17 Feb 2025 06:25    TPro  7.0  /  Alb  3.6  /  TBili  0.4  /  DBili  x   /  AST  14[L]  /  ALT  29  /  AlkPhos  94  02-16        LIVER FUNCTIONS - ( 16 Feb 2025 10:37 )  Alb: 3.6 g/dL / Pro: 7.0 gm/dL / ALK PHOS: 94 U/L / ALT: 29 U/L / AST: 14 U/L / GGT: x             Urinalysis Basic - ( 17 Feb 2025 06:25 )    Color: x / Appearance: x / SG: x / pH: x  Gluc: 122 mg/dL / Ketone: x  / Bili: x / Urobili: x   Blood: x / Protein: x / Nitrite: x   Leuk Esterase: x / RBC: x / WBC x   Sq Epi: x / Non Sq Epi: x / Bacteria: x              All other systems reviewed and found to be negative with the exception of what has been described above.    MEDICATIONS  (STANDING):  aspirin  chewable 81 milliGRAM(s) Oral daily  cloNIDine 0.3 milliGRAM(s) Oral three times a day  enoxaparin Injectable 40 milliGRAM(s) SubCutaneous every 24 hours  metoprolol succinate  milliGRAM(s) Oral daily  montelukast 10 milliGRAM(s) Oral daily  oseltamivir 75 milliGRAM(s) Oral every 12 hours  rosuvastatin 10 milliGRAM(s) Oral at bedtime  valsartan 320 milliGRAM(s) Oral daily  verapamil  milliGRAM(s) Oral daily    MEDICATIONS  (PRN):  acetaminophen     Tablet .. 650 milliGRAM(s) Oral every 6 hours PRN Temp greater or equal to 38C (100.4F)  aluminum hydroxide/magnesium hydroxide/simethicone Suspension 30 milliLiter(s) Oral every 4 hours PRN Dyspepsia  enalaprilat Injectable 1.25 milliGRAM(s) IV Push every 6 hours PRN sbp> 160mmhg  melatonin 3 milliGRAM(s) Oral at bedtime PRN Insomnia  ondansetron Injectable 4 milliGRAM(s) IV Push every 8 hours PRN Nausea and/or Vomiting      VITALS:  T(F): 99.1 (02-17-25 @ 10:30), Max: 101.2 (02-17-25 @ 08:19)  HR: 63 (02-17-25 @ 10:30) (63 - 75)  BP: 130/79 (02-17-25 @ 10:30) (130/79 - 173/111)  RR: 18 (02-17-25 @ 10:30) (18 - 18)  SpO2: 94% (02-17-25 @ 10:30) (92% - 100%)  Wt(kg): --    I&O's Summary    16 Feb 2025 07:01  -  17 Feb 2025 07:00  --------------------------------------------------------  IN: 0 mL / OUT: 800 mL / NET: -800 mL    17 Feb 2025 07:01  -  17 Feb 2025 12:02  --------------------------------------------------------  IN: 0 mL / OUT: 800 mL / NET: -800 mL        CAPILLARY BLOOD GLUCOSE          PHYSICAL EXAM:  Gen: No acute distress   HEENT:  pupils equal and reactive, EOMI,   NECK:   supple, no carotid bruits, No JVD  CV:  +S1, +S2, regular rate rhythm, no murmurs or rubs  RESP:   lungs clear to auscultation bilaterally, no wheezing, rales, rhonchi, good air entry bilaterally  GI:  abdomen soft, non-tender, non-distended, normal BS, no bruits, no abdominal masses, no palpable masses  MSK:   normal muscle tone, no atrophy, no rigidity, no contractions  EXT:  no clubbing, no cyanosis, no edema, no calf pain, swelling or erythema  VASCULAR:  pulses equal and symmetric in the upper and lower extremities  NEURO:  AAOX3, no focal neurological deficits, follows all commands, able to move extremities spontaneously  SKIN:  no ulcers, lesions or rashes      CULTURES:      Telemetry, personally reviewed

## 2025-02-17 NOTE — DISCHARGE NOTE NURSING/CASE MANAGEMENT/SOCIAL WORK - FINANCIAL ASSISTANCE
[facility] provides services at a reduced cost to those who are determined to be eligible through [facility]’s financial assistance program. Information regarding [facility]’s financial assistance program can be found by going to [email or web address] or by calling [phone].

## 2025-02-17 NOTE — DISCHARGE NOTE NURSING/CASE MANAGEMENT/SOCIAL WORK - PATIENT PORTAL LINK FT
You can access the FollowMyHealth Patient Portal offered by [facility] by registering at the following website: [email or web address]. By joining [facility]’s FollowMyHealth portal, you will also be able to view your health information using other applications (apps) compatible with our system.

## 2025-02-18 LAB
ANION GAP SERPL CALC-SCNC: 4 MMOL/L — LOW (ref 5–17)
BUN SERPL-MCNC: 17 MG/DL — SIGNIFICANT CHANGE UP (ref 7–23)
CALCIUM SERPL-MCNC: 8.1 MG/DL — LOW (ref 8.5–10.1)
CHLORIDE SERPL-SCNC: 104 MMOL/L — SIGNIFICANT CHANGE UP (ref 96–108)
CO2 SERPL-SCNC: 28 MMOL/L — SIGNIFICANT CHANGE UP (ref 22–31)
CREAT SERPL-MCNC: 1.12 MG/DL — SIGNIFICANT CHANGE UP (ref 0.5–1.3)
EGFR: 56 ML/MIN/1.73M2 — LOW
GLUCOSE SERPL-MCNC: 191 MG/DL — HIGH (ref 70–99)
HCT VFR BLD CALC: 40.9 % — SIGNIFICANT CHANGE UP (ref 34.5–45)
HGB BLD-MCNC: 13.2 G/DL — SIGNIFICANT CHANGE UP (ref 11.5–15.5)
MCHC RBC-ENTMCNC: 29.6 PG — SIGNIFICANT CHANGE UP (ref 27–34)
MCHC RBC-ENTMCNC: 32.3 G/DL — SIGNIFICANT CHANGE UP (ref 32–36)
MCV RBC AUTO: 91.7 FL — SIGNIFICANT CHANGE UP (ref 80–100)
NRBC # BLD AUTO: 0 K/UL — SIGNIFICANT CHANGE UP (ref 0–0)
NRBC # FLD: 0 K/UL — SIGNIFICANT CHANGE UP (ref 0–0)
NRBC BLD AUTO-RTO: 0 /100 WBCS — SIGNIFICANT CHANGE UP (ref 0–0)
PLATELET # BLD AUTO: 155 K/UL — SIGNIFICANT CHANGE UP (ref 150–400)
PMV BLD: 11.5 FL — SIGNIFICANT CHANGE UP (ref 7–13)
POTASSIUM SERPL-MCNC: 3.3 MMOL/L — LOW (ref 3.5–5.3)
POTASSIUM SERPL-SCNC: 3.3 MMOL/L — LOW (ref 3.5–5.3)
RBC # BLD: 4.46 M/UL — SIGNIFICANT CHANGE UP (ref 3.8–5.2)
RBC # FLD: 13.7 % — SIGNIFICANT CHANGE UP (ref 10.3–14.5)
SODIUM SERPL-SCNC: 136 MMOL/L — SIGNIFICANT CHANGE UP (ref 135–145)
WBC # BLD: 4.35 K/UL — SIGNIFICANT CHANGE UP (ref 3.8–10.5)
WBC # FLD AUTO: 4.35 K/UL — SIGNIFICANT CHANGE UP (ref 3.8–10.5)

## 2025-02-18 PROCEDURE — 99232 SBSQ HOSP IP/OBS MODERATE 35: CPT

## 2025-02-18 RX ORDER — ALPRAZOLAM 0.5 MG
0.5 TABLET, EXTENDED RELEASE 24 HR ORAL ONCE
Refills: 0 | Status: DISCONTINUED | OUTPATIENT
Start: 2025-02-18 | End: 2025-02-18

## 2025-02-18 RX ORDER — NICOTINE POLACRILEX 4 MG/1
1 GUM, CHEWING ORAL DAILY
Refills: 0 | Status: DISCONTINUED | OUTPATIENT
Start: 2025-02-18 | End: 2025-02-20

## 2025-02-18 RX ORDER — DEXTROMETHORPHAN HBR, GUAIFENESIN 200 MG/10ML
1200 LIQUID ORAL EVERY 12 HOURS
Refills: 0 | Status: DISCONTINUED | OUTPATIENT
Start: 2025-02-18 | End: 2025-02-20

## 2025-02-18 RX ADMIN — DEXTROMETHORPHAN HBR, GUAIFENESIN 1200 MILLIGRAM(S): 200 LIQUID ORAL at 00:56

## 2025-02-18 RX ADMIN — Medication 0.3 MILLIGRAM(S): at 05:43

## 2025-02-18 RX ADMIN — DEXTROMETHORPHAN HBR, GUAIFENESIN 1200 MILLIGRAM(S): 200 LIQUID ORAL at 23:01

## 2025-02-18 RX ADMIN — Medication 3 MILLIGRAM(S): at 00:57

## 2025-02-18 RX ADMIN — OSELTAMIVIR PHOSPHATE 75 MILLIGRAM(S): 75 CAPSULE ORAL at 09:49

## 2025-02-18 RX ADMIN — METOPROLOL SUCCINATE 200 MILLIGRAM(S): 50 TABLET, EXTENDED RELEASE ORAL at 09:49

## 2025-02-18 RX ADMIN — Medication 0.3 MILLIGRAM(S): at 21:08

## 2025-02-18 RX ADMIN — Medication 20 MILLIEQUIVALENT(S): at 12:19

## 2025-02-18 RX ADMIN — Medication 81 MILLIGRAM(S): at 09:49

## 2025-02-18 RX ADMIN — Medication 20 MILLIEQUIVALENT(S): at 13:01

## 2025-02-18 RX ADMIN — Medication 0.5 MILLIGRAM(S): at 15:25

## 2025-02-18 RX ADMIN — OSELTAMIVIR PHOSPHATE 75 MILLIGRAM(S): 75 CAPSULE ORAL at 21:07

## 2025-02-18 RX ADMIN — Medication 320 MILLIGRAM(S): at 09:49

## 2025-02-18 RX ADMIN — Medication 0.3 MILLIGRAM(S): at 13:01

## 2025-02-18 RX ADMIN — ROSUVASTATIN CALCIUM 10 MILLIGRAM(S): 20 TABLET, FILM COATED ORAL at 21:07

## 2025-02-18 RX ADMIN — Medication 360 MILLIGRAM(S): at 09:49

## 2025-02-18 RX ADMIN — MONTELUKAST SODIUM 10 MILLIGRAM(S): 10 TABLET ORAL at 09:49

## 2025-02-18 NOTE — PROGRESS NOTE ADULT - SUBJECTIVE AND OBJECTIVE BOX
HOSPITALIST ATTENDING PROGRESS NOTE    Chart and meds reviewed.      Subjective: Patient seen and examined. Resting comfortably SPO2       Additional results/Imaging, I have personally reviewed:    LABS:                            13.2   4.35  )-----------( 155      ( 18 Feb 2025 09:09 )             40.9     02-18    136  |  104  |  17  ----------------------------<  191[H]  3.3[L]   |  28  |  1.12    Ca    8.1[L]      18 Feb 2025 09:09              Urinalysis Basic - ( 18 Feb 2025 09:09 )    Color: x / Appearance: x / SG: x / pH: x  Gluc: 191 mg/dL / Ketone: x  / Bili: x / Urobili: x   Blood: x / Protein: x / Nitrite: x   Leuk Esterase: x / RBC: x / WBC x   Sq Epi: x / Non Sq Epi: x / Bacteria: x              All other systems reviewed and found to be negative with the exception of what has been described above.    MEDICATIONS  (STANDING):  aspirin  chewable 81 milliGRAM(s) Oral daily  chlorhexidine 4% Liquid 1 Application(s) Topical <User Schedule>  cloNIDine 0.3 milliGRAM(s) Oral three times a day  enoxaparin Injectable 40 milliGRAM(s) SubCutaneous every 24 hours  metoprolol succinate  milliGRAM(s) Oral daily  montelukast 10 milliGRAM(s) Oral daily  oseltamivir 75 milliGRAM(s) Oral every 12 hours  potassium chloride    Tablet ER 20 milliEquivalent(s) Oral every 2 hours  rosuvastatin 10 milliGRAM(s) Oral at bedtime  valsartan 320 milliGRAM(s) Oral daily  verapamil  milliGRAM(s) Oral daily    MEDICATIONS  (PRN):  acetaminophen     Tablet .. 650 milliGRAM(s) Oral every 6 hours PRN Temp greater or equal to 38C (100.4F)  aluminum hydroxide/magnesium hydroxide/simethicone Suspension 30 milliLiter(s) Oral every 4 hours PRN Dyspepsia  enalaprilat Injectable 1.25 milliGRAM(s) IV Push every 6 hours PRN sbp> 160mmhg  guaiFENesin ER 1200 milliGRAM(s) Oral every 12 hours PRN cough/congestion  melatonin 3 milliGRAM(s) Oral at bedtime PRN Insomnia  ondansetron Injectable 4 milliGRAM(s) IV Push every 8 hours PRN Nausea and/or Vomiting      VITALS:  T(F): 98.2 (02-18-25 @ 08:52), Max: 98.9 (02-17-25 @ 15:58)  HR: 56 (02-18-25 @ 08:52) (54 - 61)  BP: 147/93 (02-18-25 @ 08:52) (146/91 - 154/95)  RR: 18 (02-18-25 @ 08:52) (18 - 19)  SpO2: 97% (02-18-25 @ 08:52) (95% - 97%)  Wt(kg): --    I&O's Summary    17 Feb 2025 07:01  -  18 Feb 2025 07:00  --------------------------------------------------------  IN: 0 mL / OUT: 800 mL / NET: -800 mL        CAPILLARY BLOOD GLUCOSE          PHYSICAL EXAM:  Gen: No acute distress   HEENT:  pupils equal and reactive, EOMI,   NECK:   supple, no carotid bruits, No JVD  CV:  +S1, +S2, regular rate rhythm, no murmurs or rubs  RESP:   lungs clear to auscultation bilaterally, no wheezing, rales, rhonchi, good air entry bilaterally  GI:  abdomen soft, non-tender, non-distended, normal BS, no bruits, no abdominal masses, no palpable masses  MSK:   normal muscle tone, no atrophy, no rigidity, no contractions  EXT:  no clubbing, no cyanosis, no edema, no calf pain, swelling or erythema  VASCULAR:  pulses equal and symmetric in the upper and lower extremities  NEURO:  AAOX3, no focal neurological deficits, follows all commands, able to move extremities spontaneously  SKIN:  no ulcers, lesions or rashes      CULTURES:      Telemetry, personally reviewed HOSPITALIST ATTENDING PROGRESS NOTE    Chart and meds reviewed.      Subjective: Patient seen and examined. Resting comfortably SPO2 97% on room air. reports some orthopnea when laying on left side. Discussed with cardiolgy plan for stress test in AM       Additional results/Imaging, I have personally reviewed:    LABS:                            13.2   4.35  )-----------( 155      ( 18 Feb 2025 09:09 )             40.9     02-18    136  |  104  |  17  ----------------------------<  191[H]  3.3[L]   |  28  |  1.12    Ca    8.1[L]      18 Feb 2025 09:09              Urinalysis Basic - ( 18 Feb 2025 09:09 )    Color: x / Appearance: x / SG: x / pH: x  Gluc: 191 mg/dL / Ketone: x  / Bili: x / Urobili: x   Blood: x / Protein: x / Nitrite: x   Leuk Esterase: x / RBC: x / WBC x   Sq Epi: x / Non Sq Epi: x / Bacteria: x              All other systems reviewed and found to be negative with the exception of what has been described above.    MEDICATIONS  (STANDING):  aspirin  chewable 81 milliGRAM(s) Oral daily  chlorhexidine 4% Liquid 1 Application(s) Topical <User Schedule>  cloNIDine 0.3 milliGRAM(s) Oral three times a day  enoxaparin Injectable 40 milliGRAM(s) SubCutaneous every 24 hours  metoprolol succinate  milliGRAM(s) Oral daily  montelukast 10 milliGRAM(s) Oral daily  oseltamivir 75 milliGRAM(s) Oral every 12 hours  potassium chloride    Tablet ER 20 milliEquivalent(s) Oral every 2 hours  rosuvastatin 10 milliGRAM(s) Oral at bedtime  valsartan 320 milliGRAM(s) Oral daily  verapamil  milliGRAM(s) Oral daily    MEDICATIONS  (PRN):  acetaminophen     Tablet .. 650 milliGRAM(s) Oral every 6 hours PRN Temp greater or equal to 38C (100.4F)  aluminum hydroxide/magnesium hydroxide/simethicone Suspension 30 milliLiter(s) Oral every 4 hours PRN Dyspepsia  enalaprilat Injectable 1.25 milliGRAM(s) IV Push every 6 hours PRN sbp> 160mmhg  guaiFENesin ER 1200 milliGRAM(s) Oral every 12 hours PRN cough/congestion  melatonin 3 milliGRAM(s) Oral at bedtime PRN Insomnia  ondansetron Injectable 4 milliGRAM(s) IV Push every 8 hours PRN Nausea and/or Vomiting      VITALS:  T(F): 98.2 (02-18-25 @ 08:52), Max: 98.9 (02-17-25 @ 15:58)  HR: 56 (02-18-25 @ 08:52) (54 - 61)  BP: 147/93 (02-18-25 @ 08:52) (146/91 - 154/95)  RR: 18 (02-18-25 @ 08:52) (18 - 19)  SpO2: 97% (02-18-25 @ 08:52) (95% - 97%)  Wt(kg): --    I&O's Summary    17 Feb 2025 07:01  -  18 Feb 2025 07:00  --------------------------------------------------------  IN: 0 mL / OUT: 800 mL / NET: -800 mL        CAPILLARY BLOOD GLUCOSE          PHYSICAL EXAM:  Gen: No acute distress   HEENT:  pupils equal and reactive, EOMI,   NECK:   supple, no carotid bruits, No JVD  CV:  +S1, +S2, regular rate rhythm, no murmurs or rubs  RESP:   lungs clear to auscultation bilaterally, no wheezing, rales, rhonchi, good air entry bilaterally  GI:  abdomen soft, non-tender, non-distended, normal BS, no bruits, no abdominal masses, no palpable masses  MSK:   normal muscle tone, no atrophy, no rigidity, no contractions  EXT:  no clubbing, no cyanosis, no edema, no calf pain, swelling or erythema  VASCULAR:  pulses equal and symmetric in the upper and lower extremities  NEURO:  AAOX3, no focal neurological deficits, follows all commands, able to move extremities spontaneously  SKIN:  no ulcers, lesions or rashes      CULTURES:      Telemetry, personally reviewed

## 2025-02-18 NOTE — PROGRESS NOTE ADULT - SUBJECTIVE AND OBJECTIVE BOX
REASON FOR CONSULT: was having     CHIEF COMPLAINT:    HPI:  "59-year-old AA F, PMH of COPD, HTN on clonidine/verapamil/HCTZ; ambulatory to ED complaining of high BP at home (204/130 BP reading) with associated headache, nausea, lightheaded, FRYE yesterday AM .  No chest pain nor SOB at rest.  Headache bifrontal throbbing, mild, no associated photophobia.  No associated F/C, vision/speech/swallow changes, focal face/extremities weak/numb/tingling, gait abnormality nor LOC.  Patient ran out of her clonidine 8 days ago and unable to renew since her PCP is out of office this past week on vacation.  PCP: Sheba"      59 F from Shelter p/w CARDONA and elevated BP after she ran out of her meds > 8 days ago. HA is frontal, no assoc bluury vision, rhinorrhea , Nausea or vomiting. +flu like symptoms x 18 hours. She has no other complaints to offer at this time    ED course: BP on arrival 204/130: give lasix, hydralazine and clonidine, bp now 168/86, HR 70s. Vandana cp. , EKG: NSR  CTH: wnl  CXR: negative  Pt is an everyday smoker  Denies drug or etoh use    called for cardiology , patient says she ran out of the medication , was having dizziness episodes while standing with mild headaches ,was having episodes of shortness of breath on activity without chest pain , with wheezing , patient blood pressure was markedly elevated , patient had positive for flu , blood works showed mild elevated troponin with flat trend ,  Patient says she was taking inhaler as prescribed    patient says she did have cardiac cath in 2019 was told to be normal , patient does not see any cardiologist  , patient lives in shelter     2/18/25: Pt resting comfortable. Denies cp.  SOB improving. Denies HA, + lightheadedness at times. Tele: SB-RSR 50's-60's      PAST MEDICAL & SURGICAL HISTORY:      Allergies    penicillin (Pruritus)  Tomatoes (Hives)  Percocet (Pruritus)  hydrALAZINE (Short breath)  Norvasc (Other; Headache)    Intolerances            Social History:  active smoker from age of 7 years     FAMILY HISTORY:  mother sister brother had HTN     Home Medications:  metoprolol succinate 200 mg oral tablet, extended release: 1 tab(s) orally once a day (16 Feb 2025 15:30)  rosuvastatin 10 mg oral tablet: 1 tab(s) orally once a day (16 Feb 2025 15:30)  Singulair 10 mg oral tablet: 1 tab(s) orally once a day (16 Feb 2025 15:30)  valsartan 320 mg oral tablet: 1 tab(s) orally once a day (16 Feb 2025 15:30)  verapamil 360 mg/24 hours oral capsule, extended release: 1 cap(s) orally once a day (16 Feb 2025 15:31)    MEDICATIONS  (STANDING):  aspirin  chewable 81 milliGRAM(s) Oral daily  chlorhexidine 4% Liquid 1 Application(s) Topical <User Schedule>  cloNIDine 0.3 milliGRAM(s) Oral three times a day  enoxaparin Injectable 40 milliGRAM(s) SubCutaneous every 24 hours  metoprolol succinate  milliGRAM(s) Oral daily  montelukast 10 milliGRAM(s) Oral daily  oseltamivir 75 milliGRAM(s) Oral every 12 hours  potassium chloride    Tablet ER 20 milliEquivalent(s) Oral every 2 hours  rosuvastatin 10 milliGRAM(s) Oral at bedtime  valsartan 320 milliGRAM(s) Oral daily  verapamil  milliGRAM(s) Oral daily    MEDICATIONS  (PRN):  acetaminophen     Tablet .. 650 milliGRAM(s) Oral every 6 hours PRN Temp greater or equal to 38C (100.4F)  aluminum hydroxide/magnesium hydroxide/simethicone Suspension 30 milliLiter(s) Oral every 4 hours PRN Dyspepsia  enalaprilat Injectable 1.25 milliGRAM(s) IV Push every 6 hours PRN sbp> 160mmhg  guaiFENesin ER 1200 milliGRAM(s) Oral every 12 hours PRN cough/congestion  melatonin 3 milliGRAM(s) Oral at bedtime PRN Insomnia  ondansetron Injectable 4 milliGRAM(s) IV Push every 8 hours PRN Nausea and/or Vomiting    Vital Signs Last 24 Hrs  T(C): 36.8 (18 Feb 2025 08:52), Max: 37.2 (17 Feb 2025 15:58)  T(F): 98.2 (18 Feb 2025 08:52), Max: 98.9 (17 Feb 2025 15:58)  HR: 56 (18 Feb 2025 08:52) (54 - 61)  BP: 147/93 (18 Feb 2025 08:52) (146/91 - 154/95)  BP(mean): --  RR: 18 (18 Feb 2025 08:52) (18 - 19)  SpO2: 97% (18 Feb 2025 08:52) (95% - 97%)    Parameters below as of 18 Feb 2025 08:52  Patient On (Oxygen Delivery Method): room air          I&O's Summary    16 Feb 2025 07:01  -  17 Feb 2025 07:00  --------------------------------------------------------  IN: 0 mL / OUT: 800 mL / NET: -800 mL        PHYSICAL EXAM:    Constitutional: NAD, awake and alert, well-developed  HEENT: PERR, EOMI,  No oral cyananosis.  Neck:  supple,  No JVD  Respiratory: Breath sounds with exp wheeze   Cardiovascular: S1 and S2, regular rate and rhythm, no Murmurs, gallops or rubs  Gastrointestinal: Bowel Sounds present, soft, nontender.   Extremities: No peripheral edema. No clubbing or cyanosis.  Vascular: 2+ peripheral pulses  Neurological: A/O x 3, no focal deficits  Musculoskeletal: no calf tenderness.  Skin: No rashes.      LABS: All Labs Reviewed:                          13.2   4.35  )-----------( 155      ( 18 Feb 2025 09:09 )             40.9                           13.2   5.73  )-----------( 174      ( 17 Feb 2025 06:25 )             41.1                         13.9   6.73  )-----------( 181      ( 16 Feb 2025 10:37 )             44.4     02-18    136  |  104  |  17  ----------------------------<  191[H]  3.3[L]   |  28  |  1.12    Ca    8.1[L]      18 Feb 2025 09:09      17 Feb 2025 06:25    141    |  106    |  15     ----------------------------<  122    3.6     |  31     |  1.25   16 Feb 2025 10:37    144    |  110    |  17     ----------------------------<  84     3.6     |  30     |  1.24     Ca    8.5        17 Feb 2025 06:25  Ca    8.9        16 Feb 2025 10:37    TPro  7.0    /  Alb  3.6    /  TBili  0.4    /  DBili  x      /  AST  14     /  ALT  29     /  AlkPhos  94     16 Feb 2025 10:37      bPro-Brain Natriuretic Peptide: 1505 pg/mL (02.16.25 @ 10:37)       Blood Culture:     - TroponinI hsT: <-102.04, <-102.91, <-107.85    RADIOLOGY/EKG:  < from: Xray Chest 1 View- PORTABLE-Urgent (Xray Chest 1 View- PORTABLE-Urgent .) (02.16.25 @ 12:54) >  IMPRESSION: No focal consolidation is seen.      < from: Xray Chest 1 View- PORTABLE-Urgent (Xray Chest 1 View- PORTABLE-Urgent .) (02.16.25 @ 12:54) >  IMPRESSION: No focal consolidation is seen.      < end of copied text >    < end of copied text >  < from: Xray Chest 2 Views PA/Lat (02.14.25 @ 21:54) >  RESSION:  No focal consolidation is seen.    < end of copied text >  < from: 12 Lead ECG (02.16.25 @ 10:03) >  Diagnosis Line Sinus rhythm withPremature atrial complexes  Possible Left atrial enlargement  Minimal voltage criteria for LVH, may be normal variant ( Jamel product )  T wave abnormality, consider lateral ischemia  Abnormal ECG  When compared with ECG of 14-FEB-2025 21:25,  Premature atrial complexes are now Present  Confirmed by ALEN JERONIMO ([id number]) on 2/16/2025 2:19:32 PM    < end of copied text >  < from: 12 Lead ECG (02.14.25 @ 21:25) >  Diagnosis Line Normal sinus rhythm  Possible Left atrial enlargement  Minimal voltage criteria for LVH, may be normal variant ( Jamel product )  T wave abnormality, consider lateral ischemia  Abnormal ECG  No previous ECGs available  Confirmed by ALEN JERONIMO ([id number]) on 2/16/2025 12:17:23 AM    < end of copied text >    < from: TTE W or WO Ultrasound Enhancing Agent (02.17.25 @ 12:03) >  CONCLUSIONS:      1. Left ventricular wall thickness is moderately increased. Left ventricular systolic function is normal with an ejection fraction visually estimated at 55 to 60%.   2. Normal right ventricular cavity size and normal right ventricular systolic function.   3. Left atrium is moderately dilated.   4. Mild mitral regurgitation.   5. Moderate to severe left ventricular hypertrophy.    < end of copied text >       REASON FOR CONSULT: was having     CHIEF COMPLAINT:    HPI:  "59-year-old AA F, PMH of COPD, HTN on clonidine/verapamil/HCTZ; ambulatory to ED complaining of high BP at home (204/130 BP reading) with associated headache, nausea, lightheaded, FRYE yesterday AM .  No chest pain nor SOB at rest.  Headache bifrontal throbbing, mild, no associated photophobia.  No associated F/C, vision/speech/swallow changes, focal face/extremities weak/numb/tingling, gait abnormality nor LOC.  Patient ran out of her clonidine 8 days ago and unable to renew since her PCP is out of office this past week on vacation.  PCP: Sheba"      59 F from Shelter p/w CARDONA and elevated BP after she ran out of her meds > 8 days ago. HA is frontal, no assoc blurry vision, rhinorrhea , Nausea or vomiting. +flu like symptoms x 18 hours. She has no other complaints to offer at this time    ED course: BP on arrival 204/130: give lasix, hydralazine and clonidine, bp now 168/86, HR 70s. Vandana cp. , EKG: NSR  CTH: wnl  CXR: negative  Pt is an everyday smoker  Denies drug or etoh use    called for cardiology , patient says she ran out of the medication , was having dizziness episodes while standing with mild headaches ,was having episodes of shortness of breath on activity without chest pain , with wheezing , patient blood pressure was markedly elevated , patient had positive for flu , blood works showed mild elevated troponin with flat trend ,  Patient says she was taking inhaler as prescribed    patient says she did have cardiac cath in 2019 was told to be normal , patient does not see any cardiologist  , patient lives in shelter     2/18/25: Pt resting comfortable. Denies cp.  SOB improving. Denies HA, + lightheadedness at times. Tele: SB-RSR 50's-60's      PAST MEDICAL & SURGICAL HISTORY:      Allergies    penicillin (Pruritus)  Tomatoes (Hives)  Percocet (Pruritus)  hydrALAZINE (Short breath)  Norvasc (Other; Headache)    Intolerances            Social History:  active smoker from age of 7 years     FAMILY HISTORY:  mother sister brother had HTN     Home Medications:  metoprolol succinate 200 mg oral tablet, extended release: 1 tab(s) orally once a day (16 Feb 2025 15:30)  rosuvastatin 10 mg oral tablet: 1 tab(s) orally once a day (16 Feb 2025 15:30)  Singulair 10 mg oral tablet: 1 tab(s) orally once a day (16 Feb 2025 15:30)  valsartan 320 mg oral tablet: 1 tab(s) orally once a day (16 Feb 2025 15:30)  verapamil 360 mg/24 hours oral capsule, extended release: 1 cap(s) orally once a day (16 Feb 2025 15:31)    MEDICATIONS  (STANDING):  aspirin  chewable 81 milliGRAM(s) Oral daily  chlorhexidine 4% Liquid 1 Application(s) Topical <User Schedule>  cloNIDine 0.3 milliGRAM(s) Oral three times a day  enoxaparin Injectable 40 milliGRAM(s) SubCutaneous every 24 hours  metoprolol succinate  milliGRAM(s) Oral daily  montelukast 10 milliGRAM(s) Oral daily  oseltamivir 75 milliGRAM(s) Oral every 12 hours  potassium chloride    Tablet ER 20 milliEquivalent(s) Oral every 2 hours  rosuvastatin 10 milliGRAM(s) Oral at bedtime  valsartan 320 milliGRAM(s) Oral daily  verapamil  milliGRAM(s) Oral daily    MEDICATIONS  (PRN):  acetaminophen     Tablet .. 650 milliGRAM(s) Oral every 6 hours PRN Temp greater or equal to 38C (100.4F)  aluminum hydroxide/magnesium hydroxide/simethicone Suspension 30 milliLiter(s) Oral every 4 hours PRN Dyspepsia  enalaprilat Injectable 1.25 milliGRAM(s) IV Push every 6 hours PRN sbp> 160mmhg  guaiFENesin ER 1200 milliGRAM(s) Oral every 12 hours PRN cough/congestion  melatonin 3 milliGRAM(s) Oral at bedtime PRN Insomnia  ondansetron Injectable 4 milliGRAM(s) IV Push every 8 hours PRN Nausea and/or Vomiting    Vital Signs Last 24 Hrs  T(C): 36.8 (18 Feb 2025 08:52), Max: 37.2 (17 Feb 2025 15:58)  T(F): 98.2 (18 Feb 2025 08:52), Max: 98.9 (17 Feb 2025 15:58)  HR: 56 (18 Feb 2025 08:52) (54 - 61)  BP: 147/93 (18 Feb 2025 08:52) (146/91 - 154/95)  BP(mean): --  RR: 18 (18 Feb 2025 08:52) (18 - 19)  SpO2: 97% (18 Feb 2025 08:52) (95% - 97%)    Parameters below as of 18 Feb 2025 08:52  Patient On (Oxygen Delivery Method): room air          I&O's Summary    16 Feb 2025 07:01  -  17 Feb 2025 07:00  --------------------------------------------------------  IN: 0 mL / OUT: 800 mL / NET: -800 mL        PHYSICAL EXAM:    Constitutional: NAD, awake and alert, well-developed  HEENT: PERR, EOMI,  No oral cyananosis.  Neck:  supple,  No JVD  Respiratory: Breath sounds with exp wheeze   Cardiovascular: S1 and S2, regular rate and rhythm, no Murmurs, gallops or rubs  Gastrointestinal: Bowel Sounds present, soft, nontender.   Extremities: No peripheral edema. No clubbing or cyanosis.  Vascular: 2+ peripheral pulses  Neurological: A/O x 3, no focal deficits  Musculoskeletal: no calf tenderness.  Skin: No rashes.      LABS: All Labs Reviewed:                          13.2   4.35  )-----------( 155      ( 18 Feb 2025 09:09 )             40.9                           13.2   5.73  )-----------( 174      ( 17 Feb 2025 06:25 )             41.1                         13.9   6.73  )-----------( 181      ( 16 Feb 2025 10:37 )             44.4     02-18    136  |  104  |  17  ----------------------------<  191[H]  3.3[L]   |  28  |  1.12    Ca    8.1[L]      18 Feb 2025 09:09      17 Feb 2025 06:25    141    |  106    |  15     ----------------------------<  122    3.6     |  31     |  1.25   16 Feb 2025 10:37    144    |  110    |  17     ----------------------------<  84     3.6     |  30     |  1.24     Ca    8.5        17 Feb 2025 06:25  Ca    8.9        16 Feb 2025 10:37    TPro  7.0    /  Alb  3.6    /  TBili  0.4    /  DBili  x      /  AST  14     /  ALT  29     /  AlkPhos  94     16 Feb 2025 10:37      bPro-Brain Natriuretic Peptide: 1505 pg/mL (02.16.25 @ 10:37)       Blood Culture:     - TroponinI hsT: <-102.04, <-102.91, <-107.85    RADIOLOGY/EKG:  < from: Xray Chest 1 View- PORTABLE-Urgent (Xray Chest 1 View- PORTABLE-Urgent .) (02.16.25 @ 12:54) >  IMPRESSION: No focal consolidation is seen.      < from: Xray Chest 1 View- PORTABLE-Urgent (Xray Chest 1 View- PORTABLE-Urgent .) (02.16.25 @ 12:54) >  IMPRESSION: No focal consolidation is seen.      < end of copied text >    < end of copied text >  < from: Xray Chest 2 Views PA/Lat (02.14.25 @ 21:54) >  RESSION:  No focal consolidation is seen.    < end of copied text >  < from: 12 Lead ECG (02.16.25 @ 10:03) >  Diagnosis Line Sinus rhythm withPremature atrial complexes  Possible Left atrial enlargement  Minimal voltage criteria for LVH, may be normal variant ( Jamel product )  T wave abnormality, consider lateral ischemia  Abnormal ECG  When compared with ECG of 14-FEB-2025 21:25,  Premature atrial complexes are now Present  Confirmed by ALEN JERONIMO ([id number]) on 2/16/2025 2:19:32 PM    < end of copied text >  < from: 12 Lead ECG (02.14.25 @ 21:25) >  Diagnosis Line Normal sinus rhythm  Possible Left atrial enlargement  Minimal voltage criteria for LVH, may be normal variant ( Jamel product )  T wave abnormality, consider lateral ischemia  Abnormal ECG  No previous ECGs available  Confirmed by ALEN JERONIMO ([id number]) on 2/16/2025 12:17:23 AM    < end of copied text >    < from: TTE W or WO Ultrasound Enhancing Agent (02.17.25 @ 12:03) >  CONCLUSIONS:      1. Left ventricular wall thickness is moderately increased. Left ventricular systolic function is normal with an ejection fraction visually estimated at 55 to 60%.   2. Normal right ventricular cavity size and normal right ventricular systolic function.   3. Left atrium is moderately dilated.   4. Mild mitral regurgitation.   5. Moderate to severe left ventricular hypertrophy.    < end of copied text >

## 2025-02-18 NOTE — PROGRESS NOTE ADULT - PROBLEM SELECTOR PLAN 1
·  Problem: Shortness of breath.   ·  Recommendation: likely multifactorial; patient with h/o COPD/HTN, + flu; possible exacerbation of COPD , possible component of diastolic dysfunction with associated underlying hypertensive heart disease with uncontrolled hypertension , minimal elevated troponin with flat trend possible demand related ischemia , less likely ACS   .  Echo with NL LVF, LA mod dilated, Mild MR, Mod-severe LVH  .  CW ASA, BB  .  Plan for stress test once her pulmonary symptoms improves. ·  Problem: Shortness of breath.   ·  Recommendation: likely multifactorial; patient with h/o COPD/HTN, + flu; possible exacerbation of COPD , possible component of diastolic dysfunction with associated underlying hypertensive heart disease with uncontrolled hypertension , minimal elevated troponin with flat trend possible demand related ischemia , less likely ACS   .  Echo with NL LVF, LA mod dilated, Mild MR, Mod-severe LVH  .  CW ASA, BB  .  Plan for stress test once her pulmonary symptoms improves can be done as OP

## 2025-02-19 LAB
ANION GAP SERPL CALC-SCNC: 4 MMOL/L — LOW (ref 5–17)
BUN SERPL-MCNC: 19 MG/DL — SIGNIFICANT CHANGE UP (ref 7–23)
CALCIUM SERPL-MCNC: 8.1 MG/DL — LOW (ref 8.5–10.1)
CHLORIDE SERPL-SCNC: 107 MMOL/L — SIGNIFICANT CHANGE UP (ref 96–108)
CO2 SERPL-SCNC: 28 MMOL/L — SIGNIFICANT CHANGE UP (ref 22–31)
CREAT SERPL-MCNC: 1.02 MG/DL — SIGNIFICANT CHANGE UP (ref 0.5–1.3)
EGFR: 63 ML/MIN/1.73M2 — SIGNIFICANT CHANGE UP
GLUCOSE SERPL-MCNC: 207 MG/DL — HIGH (ref 70–99)
HCT VFR BLD CALC: 41.1 % — SIGNIFICANT CHANGE UP (ref 34.5–45)
HGB BLD-MCNC: 13 G/DL — SIGNIFICANT CHANGE UP (ref 11.5–15.5)
MCHC RBC-ENTMCNC: 29.2 PG — SIGNIFICANT CHANGE UP (ref 27–34)
MCHC RBC-ENTMCNC: 31.6 G/DL — LOW (ref 32–36)
MCV RBC AUTO: 92.4 FL — SIGNIFICANT CHANGE UP (ref 80–100)
NRBC # BLD AUTO: 0 K/UL — SIGNIFICANT CHANGE UP (ref 0–0)
NRBC # FLD: 0 K/UL — SIGNIFICANT CHANGE UP (ref 0–0)
NRBC BLD AUTO-RTO: 0 /100 WBCS — SIGNIFICANT CHANGE UP (ref 0–0)
PLATELET # BLD AUTO: 156 K/UL — SIGNIFICANT CHANGE UP (ref 150–400)
PMV BLD: 12.1 FL — SIGNIFICANT CHANGE UP (ref 7–13)
POTASSIUM SERPL-MCNC: 4 MMOL/L — SIGNIFICANT CHANGE UP (ref 3.5–5.3)
POTASSIUM SERPL-SCNC: 4 MMOL/L — SIGNIFICANT CHANGE UP (ref 3.5–5.3)
RBC # BLD: 4.45 M/UL — SIGNIFICANT CHANGE UP (ref 3.8–5.2)
RBC # FLD: 13.6 % — SIGNIFICANT CHANGE UP (ref 10.3–14.5)
SODIUM SERPL-SCNC: 139 MMOL/L — SIGNIFICANT CHANGE UP (ref 135–145)
WBC # BLD: 4.97 K/UL — SIGNIFICANT CHANGE UP (ref 3.8–10.5)
WBC # FLD AUTO: 4.97 K/UL — SIGNIFICANT CHANGE UP (ref 3.8–10.5)

## 2025-02-19 PROCEDURE — 99233 SBSQ HOSP IP/OBS HIGH 50: CPT

## 2025-02-19 PROCEDURE — 99232 SBSQ HOSP IP/OBS MODERATE 35: CPT

## 2025-02-19 RX ORDER — ALBUTEROL SULFATE 2.5 MG/3ML
2 VIAL, NEBULIZER (ML) INHALATION EVERY 6 HOURS
Refills: 0 | Status: DISCONTINUED | OUTPATIENT
Start: 2025-02-19 | End: 2025-02-20

## 2025-02-19 RX ORDER — LORAZEPAM 4 MG/ML
1 VIAL (ML) INJECTION ONCE
Refills: 0 | Status: DISCONTINUED | OUTPATIENT
Start: 2025-02-19 | End: 2025-02-19

## 2025-02-19 RX ORDER — LORAZEPAM 4 MG/ML
1 VIAL (ML) INJECTION
Refills: 0 | Status: DISCONTINUED | OUTPATIENT
Start: 2025-02-19 | End: 2025-02-20

## 2025-02-19 RX ORDER — IPRATROPIUM BROMIDE AND ALBUTEROL SULFATE .5; 2.5 MG/3ML; MG/3ML
3 SOLUTION RESPIRATORY (INHALATION) ONCE
Refills: 0 | Status: DISCONTINUED | OUTPATIENT
Start: 2025-02-19 | End: 2025-02-19

## 2025-02-19 RX ADMIN — DEXTROMETHORPHAN HBR, GUAIFENESIN 1200 MILLIGRAM(S): 200 LIQUID ORAL at 22:16

## 2025-02-19 RX ADMIN — NICOTINE POLACRILEX 1 PATCH: 4 GUM, CHEWING ORAL at 19:30

## 2025-02-19 RX ADMIN — Medication 81 MILLIGRAM(S): at 09:07

## 2025-02-19 RX ADMIN — Medication 360 MILLIGRAM(S): at 09:07

## 2025-02-19 RX ADMIN — Medication 0.3 MILLIGRAM(S): at 21:26

## 2025-02-19 RX ADMIN — METOPROLOL SUCCINATE 200 MILLIGRAM(S): 50 TABLET, EXTENDED RELEASE ORAL at 09:08

## 2025-02-19 RX ADMIN — Medication 3 MILLIGRAM(S): at 22:16

## 2025-02-19 RX ADMIN — OSELTAMIVIR PHOSPHATE 75 MILLIGRAM(S): 75 CAPSULE ORAL at 21:26

## 2025-02-19 RX ADMIN — Medication 0.3 MILLIGRAM(S): at 05:03

## 2025-02-19 RX ADMIN — ROSUVASTATIN CALCIUM 10 MILLIGRAM(S): 20 TABLET, FILM COATED ORAL at 21:26

## 2025-02-19 RX ADMIN — Medication 0.3 MILLIGRAM(S): at 13:40

## 2025-02-19 RX ADMIN — MONTELUKAST SODIUM 10 MILLIGRAM(S): 10 TABLET ORAL at 09:07

## 2025-02-19 RX ADMIN — Medication 1 MILLIGRAM(S): at 12:22

## 2025-02-19 RX ADMIN — Medication 1 APPLICATION(S): at 05:56

## 2025-02-19 RX ADMIN — NICOTINE POLACRILEX 1 PATCH: 4 GUM, CHEWING ORAL at 09:06

## 2025-02-19 RX ADMIN — OSELTAMIVIR PHOSPHATE 75 MILLIGRAM(S): 75 CAPSULE ORAL at 09:07

## 2025-02-19 RX ADMIN — Medication 320 MILLIGRAM(S): at 09:09

## 2025-02-19 NOTE — PROGRESS NOTE ADULT - SUBJECTIVE AND OBJECTIVE BOX
HOSPITALIST ATTENDING PROGRESS NOTE    Chart and meds reviewed.      Subjective: Patient seen and examined. Resting comfrotably , does have some axiousness. Plan for stress test in AM. NPO at midnight. denies chest pain shortness of breath. does  report orthopnea. Continue tamifly.       Additional results/Imaging, I have personally reviewed:    LABS:                            13.0   4.97  )-----------( 156      ( 19 Feb 2025 07:07 )             41.1     02-19    139  |  107  |  19  ----------------------------<  207[H]  4.0   |  28  |  1.02    Ca    8.1[L]      19 Feb 2025 07:07              Urinalysis Basic - ( 19 Feb 2025 07:07 )    Color: x / Appearance: x / SG: x / pH: x  Gluc: 207 mg/dL / Ketone: x  / Bili: x / Urobili: x   Blood: x / Protein: x / Nitrite: x   Leuk Esterase: x / RBC: x / WBC x   Sq Epi: x / Non Sq Epi: x / Bacteria: x              All other systems reviewed and found to be negative with the exception of what has been described above.    MEDICATIONS  (STANDING):  aspirin  chewable 81 milliGRAM(s) Oral daily  chlorhexidine 4% Liquid 1 Application(s) Topical <User Schedule>  cloNIDine 0.3 milliGRAM(s) Oral three times a day  enoxaparin Injectable 40 milliGRAM(s) SubCutaneous every 24 hours  metoprolol succinate  milliGRAM(s) Oral daily  montelukast 10 milliGRAM(s) Oral daily  nicotine -  14 mG/24Hr(s) Patch 1 Patch Transdermal daily  oseltamivir 75 milliGRAM(s) Oral every 12 hours  rosuvastatin 10 milliGRAM(s) Oral at bedtime  valsartan 320 milliGRAM(s) Oral daily  verapamil  milliGRAM(s) Oral daily    MEDICATIONS  (PRN):  acetaminophen     Tablet .. 650 milliGRAM(s) Oral every 6 hours PRN Temp greater or equal to 38C (100.4F)  albuterol    90 MICROgram(s) HFA Inhaler 2 Puff(s) Inhalation every 6 hours PRN Respiratory Distress  aluminum hydroxide/magnesium hydroxide/simethicone Suspension 30 milliLiter(s) Oral every 4 hours PRN Dyspepsia  enalaprilat Injectable 1.25 milliGRAM(s) IV Push every 6 hours PRN sbp> 160mmhg  guaiFENesin ER 1200 milliGRAM(s) Oral every 12 hours PRN cough/congestion  LORazepam     Tablet 1 milliGRAM(s) Oral two times a day PRN Anxiety  melatonin 3 milliGRAM(s) Oral at bedtime PRN Insomnia  ondansetron Injectable 4 milliGRAM(s) IV Push every 8 hours PRN Nausea and/or Vomiting      VITALS:  T(F): 98.9 (02-19-25 @ 00:36), Max: 99.2 (02-18-25 @ 16:00)  HR: 55 (02-19-25 @ 12:00) (55 - 58)  BP: 138/92 (02-19-25 @ 12:00) (138/92 - 164/96)  RR: 18 (02-19-25 @ 12:00) (18 - 18)  SpO2: 100% (02-19-25 @ 12:00) (96% - 100%)  Wt(kg): --    I&O's Summary      CAPILLARY BLOOD GLUCOSE          PHYSICAL EXAM:  Gen: No acute distress   HEENT:  pupils equal and reactive, EOMI,   NECK:   supple, no carotid bruits, No JVD  CV:  +S1, +S2, regular rate rhythm, no murmurs or rubs  RESP:   lungs clear to auscultation bilaterally, no wheezing, rales, rhonchi, good air entry bilaterally  GI:  abdomen soft, non-tender, non-distended, normal BS, no bruits, no abdominal masses, no palpable masses  MSK:   normal muscle tone, no atrophy, no rigidity, no contractions  EXT:  no clubbing, no cyanosis, no edema, no calf pain, swelling or erythema  VASCULAR:  pulses equal and symmetric in the upper and lower extremities  NEURO:  AAOX3, no focal neurological deficits, follows all commands, able to move extremities spontaneously  SKIN:  no ulcers, lesions or rashes      CULTURES:      Telemetry, personally reviewed

## 2025-02-19 NOTE — PROGRESS NOTE ADULT - ASSESSMENT
59 F from Shelter p/w CARDONA and elevated BP after she ran out of her meds > 8 days ago. HA is frontal, no assoc bluury vision, rhinorrhea , Nausea or vomiting. +flu like symptoms x 18 hours. BP on arrival 204/130: give lasix, hydralazine and clonidine, bp now 168/86, HR 70s. Admitted for hypertensive emergency and Flu    CTH: wnl  CXR: negative  Pt is an everyday smoker  Denies drug or etoh use    #HTN emergency  #Influenza A  - admit to tele  - vasotec iv prn for refractory bps  - HA/resolved, denies cp despite slight elevation tin trop which is likely demand from above  - 2D echo -   Left ventricular wall thickness is moderately increased. Left ventricular systolic function is normal with an ejection fraction visually estimated at 55 to 60%. Moderate to severe left ventricular hypertrophy.  - ASA  statin , BB   - resume home meds  - start tamiflu x 5 days day 4/5  - isolation precautions  - smoking cessation education/nicotine patch offered  continue low salt diet  Cardiology consulted recommendations appreciated   stress test in AM   - DVT px        
59 F from Shelter p/w CARDONA and elevated BP after she ran out of her meds > 8 days ago. HA is frontal, no assoc bluury vision, rhinorrhea , Nausea or vomiting. +flu like symptoms x 18 hours. BP on arrival 204/130: give lasix, hydralazine and clonidine, bp now 168/86, HR 70s. Admitted for hypertensive emergency and Flu    CTH: wnl  CXR: negative  Pt is an everyday smoker  Denies drug or etoh use    #HTN emergency  #Influenza A  - admit to tele  - vasotec iv prn for refractory bps  - HA/resolved, denies cp despite slight elevation tin trop which is likely demand from above  - 2D echo - pending   - ASA  statin , BB   - resume home meds  - start tamiflu x 5 days day2/5  - isolation precautions  - smoking cessation education/nicotine patch offered  continue low salt diet  Cardiology consulted recommendations appreciated   stress test once her pulmonary symptoms improves.  - DVT px    
59 F from Shelter p/w CARDONA and elevated BP after she ran out of her meds > 8 days ago. HA is frontal, no assoc bluury vision, rhinorrhea , Nausea or vomiting. +flu like symptoms x 18 hours. BP on arrival 204/130: give lasix, hydralazine and clonidine, bp now 168/86, HR 70s. Admitted for hypertensive emergency and Flu    CTH: wnl  CXR: negative  Pt is an everyday smoker  Denies drug or etoh use    #HTN emergency  #Influenza A  - admit to tele  - vasotec iv prn for refractory bps  - HA/resolved, denies cp despite slight elevation tin trop which is likely demand from above  - 2D echo - pending   - ASA  statin , BB   - resume home meds  - start tamiflu x 5 days day 3/5  - isolation precautions  - smoking cessation education/nicotine patch offered  continue low salt diet  Cardiology consulted recommendations appreciated   stress test once her pulmonary symptoms improves. - likely tomorrow   - DVT px

## 2025-02-19 NOTE — PROGRESS NOTE ADULT - PROBLEM SELECTOR PLAN 1
·  Problem: Shortness of breath.   ·  Recommendation: likely multifactorial; patient with h/o COPD/HTN, + flu; possible exacerbation of COPD , possible component of diastolic dysfunction with associated underlying hypertensive heart disease with uncontrolled hypertension , minimal elevated troponin with flat trend possible demand related ischemia , less likely ACS   .  Echo with NL LVF, LA mod dilated, Mild MR, Mod-severe LVH  .  CW ASA, BB  . no SOB - scheduled chemical stress test for tomorrow 2/29, npo p midnight

## 2025-02-19 NOTE — PROGRESS NOTE ADULT - SUBJECTIVE AND OBJECTIVE BOX
REASON FOR CONSULT: was having     CHIEF COMPLAINT:    HPI:  "59-year-old AA F, PMH of COPD, HTN on clonidine/verapamil/HCTZ; ambulatory to ED complaining of high BP at home (204/130 BP reading) with associated headache, nausea, lightheaded, FRYE yesterday AM .  No chest pain nor SOB at rest.  Headache bifrontal throbbing, mild, no associated photophobia.  No associated F/C, vision/speech/swallow changes, focal face/extremities weak/numb/tingling, gait abnormality nor LOC.  Patient ran out of her clonidine 8 days ago and unable to renew since her PCP is out of office this past week on vacation.  PCP: Sheba"      59 F from Shelter p/w CARDONA and elevated BP after she ran out of her meds > 8 days ago. HA is frontal, no assoc blurry vision, rhinorrhea , Nausea or vomiting. +flu like symptoms x 18 hours. She has no other complaints to offer at this time    ED course: BP on arrival 204/130: give lasix, hydralazine and clonidine, bp now 168/86, HR 70s. [place] cp. , EKG: NSR  CTH: wnl  CXR: negative  Pt is an everyday smoker  Denies drug or etoh use    called for cardiology , patient says she ran out of the medication , was having dizziness episodes while standing with mild headaches ,was having episodes of shortness of breath on activity without chest pain , with wheezing , patient blood pressure was markedly elevated , patient had positive for flu , blood works showed mild elevated troponin with flat trend ,  Patient says she was taking inhaler as prescribed    patient says she did have cardiac cath in 2019 was told to be normal , patient does not see any cardiologist  , patient lives in shelter     2/18/25: Pt resting comfortable. Denies cp.  SOB improving. Denies HA, + lightheadedness at times. Tele: SB-RSR 50's-60's  2/19/25: pt. waling around the room, no SOB, will schedule chemical stress test for tomorrow, refuses tele     Home Medications:  metoprolol succinate 200 mg oral tablet, extended release: 1 tab(s) orally once a day (16 Feb 2025 15:30)  rosuvastatin 10 mg oral tablet: 1 tab(s) orally once a day (16 Feb 2025 15:30)  Singulair 10 mg oral tablet: 1 tab(s) orally once a day (16 Feb 2025 15:30)  valsartan 320 mg oral tablet: 1 tab(s) orally once a day (16 Feb 2025 15:30)  verapamil 360 mg/24 hours oral capsule, extended release: 1 cap(s) orally once a day (16 Feb 2025 15:31)    MEDICATIONS  (STANDING):  aspirin  chewable 81 milliGRAM(s) Oral daily  chlorhexidine 4% Liquid 1 Application(s) Topical <User Schedule>  cloNIDine 0.3 milliGRAM(s) Oral three times a day  enoxaparin Injectable 40 milliGRAM(s) SubCutaneous every 24 hours  metoprolol succinate  milliGRAM(s) Oral daily  montelukast 10 milliGRAM(s) Oral daily  nicotine -  14 mG/24Hr(s) Patch 1 Patch Transdermal daily  oseltamivir 75 milliGRAM(s) Oral every 12 hours  rosuvastatin 10 milliGRAM(s) Oral at bedtime  valsartan 320 milliGRAM(s) Oral daily  verapamil  milliGRAM(s) Oral daily      MEDICATIONS  (PRN):  acetaminophen     Tablet .. 650 milliGRAM(s) Oral every 6 hours PRN Temp greater or equal to 38C (100.4F)  aluminum hydroxide/magnesium hydroxide/simethicone Suspension 30 milliLiter(s) Oral every 4 hours PRN Dyspepsia  enalaprilat Injectable 1.25 milliGRAM(s) IV Push every 6 hours PRN sbp> 160mmhg  guaiFENesin ER 1200 milliGRAM(s) Oral every 12 hours PRN cough/congestion  melatonin 3 milliGRAM(s) Oral at bedtime PRN Insomnia  ondansetron Injectable 4 milliGRAM(s) IV Push every 8 hours PRN Nausea and/or Vomiting    Vital Signs Last 24 Hrs  T(C): 37.2 (19 Feb 2025 00:36), Max: 37.3 (18 Feb 2025 16:00)  T(F): 98.9 (19 Feb 2025 00:36), Max: 99.2 (18 Feb 2025 16:00)  HR: 55 (19 Feb 2025 08:26) (55 - 58)  BP: 144/96 (19 Feb 2025 08:26) (144/96 - 164/96)  BP(mean): 104 (18 Feb 2025 13:05) (104 - 104)  RR: 18 (19 Feb 2025 08:26) (18 - 18)  SpO2: 96% (19 Feb 2025 08:26) (93% - 98%)    Parameters below as of 19 Feb 2025 08:26  Patient On (Oxygen Delivery Method): room air      PHYSICAL EXAM:    Constitutional: NAD, awake and alert, well-developed  HEENT: PERR, EOMI,  No oral cyananosis.  Neck:  supple,  No JVD  Respiratory: Breath sounds with exp wheeze   Cardiovascular: S1 and S2, regular rate and rhythm, no Murmurs, gallops or rubs  Gastrointestinal: Bowel Sounds present, soft, nontender.   Extremities: No peripheral edema. No clubbing or cyanosis.  Vascular: 2+ peripheral pulses  Neurological: A/O x 3, no focal deficits  Musculoskeletal: no calf tenderness.  Skin: No rashes.      LABS: All Labs Reviewed:                         13.0   4.97  )-----------( 156      ( 19 Feb 2025 07:07 )             41.1     02-19    139  |  107  |  19  ----------------------------<  207[H]  4.0   |  28  |  1.02    Ca    8.1[L]      19 Feb 2025 07:07      - TroponinI hsT: <-102.81, <-102.04, <-102.91, <-107.85      RADIOLOGY/EKG:  < from: Xray Chest 1 View- PORTABLE-Urgent (Xray Chest 1 View- PORTABLE-Urgent .) (02.16.25 @ 12:54) >  IMPRESSION: No focal consolidation is seen.      < from: Xray Chest 1 View- PORTABLE-Urgent (Xray Chest 1 View- PORTABLE-Urgent .) (02.16.25 @ 12:54) >  IMPRESSION: No focal consolidation is seen.      < end of copied text >    < end of copied text >  < from: Xray Chest 2 Views PA/Lat (02.14.25 @ 21:54) >  RESSION:  No focal consolidation is seen.    < end of copied text >  < from: 12 Lead ECG (02.16.25 @ 10:03) >  Diagnosis Line Sinus rhythm withPremature atrial complexes  Possible Left atrial enlargement  Minimal voltage criteria for LVH, may be normal variant ( [place] product )  T wave abnormality, consider lateral ischemia  Abnormal ECG  When compared with ECG of 14-FEB-2025 21:25,  Premature atrial complexes are now Present  Confirmed by ALEN JERONIMO ([id number]) on 2/16/2025 2:19:32 PM    < end of copied text >  < from: 12 Lead ECG (02.14.25 @ 21:25) >  Diagnosis Line Normal sinus rhythm  Possible Left atrial enlargement  Minimal voltage criteria for LVH, may be normal variant ( [place] product )  T wave abnormality, consider lateral ischemia  Abnormal ECG  No previous ECGs available  Confirmed by ALEN JERONIMO ([id number]) on 2/16/2025 12:17:23 AM    < end of copied text >    < from: TTE W or WO Ultrasound Enhancing Agent (02.17.25 @ 12:03) >  CONCLUSIONS:      1. Left ventricular wall thickness is moderately increased. Left ventricular systolic function is normal with an ejection fraction visually estimated at 55 to 60%.   2. Normal right ventricular cavity size and normal right ventricular systolic function.   3. Left atrium is moderately dilated.   4. Mild mitral regurgitation.   5. Moderate to severe left ventricular hypertrophy.    < end of copied text >

## 2025-02-20 ENCOUNTER — TRANSCRIPTION ENCOUNTER (OUTPATIENT)
Age: 60
End: 2025-02-20

## 2025-02-20 VITALS
OXYGEN SATURATION: 99 % | RESPIRATION RATE: 18 BRPM | DIASTOLIC BLOOD PRESSURE: 98 MMHG | HEART RATE: 62 BPM | SYSTOLIC BLOOD PRESSURE: 176 MMHG

## 2025-02-20 LAB
ANION GAP SERPL CALC-SCNC: 6 MMOL/L — SIGNIFICANT CHANGE UP (ref 5–17)
BUN SERPL-MCNC: 16 MG/DL — SIGNIFICANT CHANGE UP (ref 7–23)
CALCIUM SERPL-MCNC: 8.8 MG/DL — SIGNIFICANT CHANGE UP (ref 8.5–10.1)
CHLORIDE SERPL-SCNC: 108 MMOL/L — SIGNIFICANT CHANGE UP (ref 96–108)
CO2 SERPL-SCNC: 27 MMOL/L — SIGNIFICANT CHANGE UP (ref 22–31)
CREAT SERPL-MCNC: 0.91 MG/DL — SIGNIFICANT CHANGE UP (ref 0.5–1.3)
EGFR: 72 ML/MIN/1.73M2 — SIGNIFICANT CHANGE UP
GLUCOSE SERPL-MCNC: 139 MG/DL — HIGH (ref 70–99)
HCT VFR BLD CALC: 43.7 % — SIGNIFICANT CHANGE UP (ref 34.5–45)
HGB BLD-MCNC: 13.8 G/DL — SIGNIFICANT CHANGE UP (ref 11.5–15.5)
MCHC RBC-ENTMCNC: 29.3 PG — SIGNIFICANT CHANGE UP (ref 27–34)
MCHC RBC-ENTMCNC: 31.6 G/DL — LOW (ref 32–36)
MCV RBC AUTO: 92.8 FL — SIGNIFICANT CHANGE UP (ref 80–100)
NRBC # BLD AUTO: 0 K/UL — SIGNIFICANT CHANGE UP (ref 0–0)
NRBC # FLD: 0 K/UL — SIGNIFICANT CHANGE UP (ref 0–0)
NRBC BLD AUTO-RTO: 0 /100 WBCS — SIGNIFICANT CHANGE UP (ref 0–0)
PLATELET # BLD AUTO: 170 K/UL — SIGNIFICANT CHANGE UP (ref 150–400)
PMV BLD: 12 FL — SIGNIFICANT CHANGE UP (ref 7–13)
POTASSIUM SERPL-MCNC: 4.2 MMOL/L — SIGNIFICANT CHANGE UP (ref 3.5–5.3)
POTASSIUM SERPL-SCNC: 4.2 MMOL/L — SIGNIFICANT CHANGE UP (ref 3.5–5.3)
RBC # BLD: 4.71 M/UL — SIGNIFICANT CHANGE UP (ref 3.8–5.2)
RBC # FLD: 13.5 % — SIGNIFICANT CHANGE UP (ref 10.3–14.5)
SODIUM SERPL-SCNC: 141 MMOL/L — SIGNIFICANT CHANGE UP (ref 135–145)
WBC # BLD: 7.41 K/UL — SIGNIFICANT CHANGE UP (ref 3.8–10.5)
WBC # FLD AUTO: 7.41 K/UL — SIGNIFICANT CHANGE UP (ref 3.8–10.5)

## 2025-02-20 PROCEDURE — 99232 SBSQ HOSP IP/OBS MODERATE 35: CPT

## 2025-02-20 PROCEDURE — 99239 HOSP IP/OBS DSCHRG MGMT >30: CPT

## 2025-02-20 RX ORDER — METOPROLOL SUCCINATE 50 MG/1
1 TABLET, EXTENDED RELEASE ORAL
Qty: 30 | Refills: 0
Start: 2025-02-20

## 2025-02-20 RX ORDER — ASPIRIN 325 MG
1 TABLET ORAL
Qty: 30 | Refills: 0 | DISCHARGE
Start: 2025-02-20

## 2025-02-20 RX ORDER — ALBUTEROL SULFATE 2.5 MG/3ML
2 VIAL, NEBULIZER (ML) INHALATION
Qty: 1 | Refills: 0
Start: 2025-02-20

## 2025-02-20 RX ORDER — METOPROLOL SUCCINATE 50 MG/1
1 TABLET, EXTENDED RELEASE ORAL
Refills: 0 | DISCHARGE

## 2025-02-20 RX ORDER — METOPROLOL SUCCINATE 50 MG/1
1 TABLET, EXTENDED RELEASE ORAL
Qty: 30 | Refills: 0 | DISCHARGE
Start: 2025-02-20

## 2025-02-20 RX ORDER — ASPIRIN 325 MG
1 TABLET ORAL
Qty: 30 | Refills: 0
Start: 2025-02-20

## 2025-02-20 RX ADMIN — MONTELUKAST SODIUM 10 MILLIGRAM(S): 10 TABLET ORAL at 09:02

## 2025-02-20 RX ADMIN — Medication 0.3 MILLIGRAM(S): at 13:32

## 2025-02-20 RX ADMIN — Medication 1 APPLICATION(S): at 05:22

## 2025-02-20 RX ADMIN — Medication 0.3 MILLIGRAM(S): at 05:01

## 2025-02-20 RX ADMIN — METOPROLOL SUCCINATE 200 MILLIGRAM(S): 50 TABLET, EXTENDED RELEASE ORAL at 09:01

## 2025-02-20 RX ADMIN — NICOTINE POLACRILEX 1 PATCH: 4 GUM, CHEWING ORAL at 09:08

## 2025-02-20 RX ADMIN — Medication 320 MILLIGRAM(S): at 09:01

## 2025-02-20 RX ADMIN — OSELTAMIVIR PHOSPHATE 75 MILLIGRAM(S): 75 CAPSULE ORAL at 09:02

## 2025-02-20 RX ADMIN — Medication 1 MILLIGRAM(S): at 09:06

## 2025-02-20 RX ADMIN — Medication 81 MILLIGRAM(S): at 09:01

## 2025-02-20 RX ADMIN — Medication 360 MILLIGRAM(S): at 09:02

## 2025-02-20 NOTE — PROGRESS NOTE ADULT - PROBLEM SELECTOR PLAN 4
·  Problem: Hyperlipidemia.   ·  Recommendation: continue statin.

## 2025-02-20 NOTE — PROGRESS NOTE ADULT - PROBLEM SELECTOR PLAN 1
·  Problem: Shortness of breath.   ·  Recommendation: likely multifactorial; patient with h/o COPD/HTN, + flu; possible exacerbation of COPD , possible component of diastolic dysfunction with associated underlying hypertensive heart disease with uncontrolled hypertension , minimal elevated troponin with flat trend possible demand related ischemia , less likely ACS   .  Echo with NL LVF, LA mod dilated, Mild MR, Mod-severe LVH  .  CW ASA, BB  . could not tolerate stress test today 2/20, can have op    op stress test follow up with Dr. Palla, will sign off

## 2025-02-20 NOTE — PROGRESS NOTE ADULT - PROBLEM SELECTOR PLAN 3
·  Problem: Malignant hypertension.   ·  Recommendation: hx of HTN  , ran out of clonidine, now + flu.  OP meds restarted.  BP improving , continue low salt diet
·  Problem: Malignant hypertension.   ·  Recommendation: hx of HTN  , ran out of clonidine, now + flu.  OP meds restarted.  BP improving , continue low salt diet
·  Problem: Malignant hypertension.   ·  Recommendation: hx of HTN  , ran out of clonidine, now + flu.  OP meds restarted.  continue low salt diet

## 2025-02-20 NOTE — DISCHARGE NOTE PROVIDER - HOSPITAL COURSE
59-year-old AA F, PMH of COPD, HTN on clonidine/verapamil/HCTZ; ambulatory to ED complaining of high BP at home (204/130 BP reading) with associated headache, nausea, lightheaded, FRYE this a.m.  No chest pain nor SOB at rest.  Headache bifrontal throbbing, mild, no associated photophobia.  No associated F/C, vision/speech/swallow changes, focal face/extremities weak/numb/tingling, gait abnormality nor LOC.  Patient ran out of her clonidine 8 days ago and unable to renew since her PCP is out of office this past week on vacation. Patient admitted to the hospitalist service for  HTN emergency Influenza A. HA/resolved, denies cp despite slight elevation tin trop which is likely demand from above. Started on vasotec iv prn for refractory bps. 2D echo -   Left ventricular wall thickness is moderately increased. Left ventricular systolic function is normal with an ejection fraction visually estimated at 55 to 60%. Moderate to severe left ventricular hypertrophy. Continue aspirin statin BB. Completed course of tamiflu.  recommended for stress test however patient refused. Recommend outpatient stress test. Patient to be discharged to shelter. Follow up with PCP in 1 week. Follow up with cardiology in 1 week. Patient to return to ed with any worsening symptoms chest pain shortness of breath fevers chills nausea vomiting diarrhea.      59-year-old AA F, PMH of COPD, HTN on clonidine/verapamil/HCTZ; ambulatory to ED complaining of high BP at home (204/130 BP reading) with associated headache, nausea, lightheaded, FRYE this a.m.  No chest pain nor SOB at rest.  Headache bifrontal throbbing, mild, no associated photophobia.  No associated F/C, vision/speech/swallow changes, focal face/extremities weak/numb/tingling, gait abnormality nor LOC.  Patient ran out of her clonidine 8 days ago and unable to renew since her PCP is out of office this past week on vacation. Patient admitted to the hospitalist service for  HTN emergency Influenza A. HA/resolved, denies cp despite slight elevation tin trop which is likely demand from above. Started on vasotec iv prn for refractory bps. 2D echo -   Left ventricular wall thickness is moderately increased. Left ventricular systolic function is normal with an ejection fraction visually estimated at 55 to 60%. Moderate to severe left ventricular hypertrophy. Continue aspirin statin BB. Completed course of tamiflu.  recommended for stress test however patient refused. Recommend outpatient stress test. Patient to be discharged to shelter. Follow up with PCP in 1 week. Follow up with cardiology in 1 week. Patient to return to ed with any worsening symptoms chest pain shortness of breath fevers chills nausea vomiting diarrhea. High risk for readmission due to non compliance.

## 2025-02-20 NOTE — PROGRESS NOTE ADULT - SUBJECTIVE AND OBJECTIVE BOX
REASON FOR CONSULT: was having     CHIEF COMPLAINT:    HPI:  "59-year-old AA F, PMH of COPD, HTN on clonidine/verapamil/HCTZ; ambulatory to ED complaining of high BP at home (204/130 BP reading) with associated headache, nausea, lightheaded, FRYE yesterday AM .  No chest pain nor SOB at rest.  Headache bifrontal throbbing, mild, no associated photophobia.  No associated F/C, vision/speech/swallow changes, focal face/extremities weak/numb/tingling, gait abnormality nor LOC.  Patient ran out of her clonidine 8 days ago and unable to renew since her PCP is out of office this past week on vacation.  PCP: Sheba"      59 F from Shelter p/w CARDONA and elevated BP after she ran out of her meds > 8 days ago. HA is frontal, no assoc blurry vision, rhinorrhea , Nausea or vomiting. +flu like symptoms x 18 hours. She has no other complaints to offer at this time    ED course: BP on arrival 204/130: give lasix, hydralazine and clonidine, bp now 168/86, HR 70s. [place] cp. , EKG: NSR  CTH: wnl  CXR: negative  Pt is an everyday smoker  Denies drug or etoh use    called for cardiology , patient says she ran out of the medication , was having dizziness episodes while standing with mild headaches ,was having episodes of shortness of breath on activity without chest pain , with wheezing , patient blood pressure was markedly elevated , patient had positive for flu , blood works showed mild elevated troponin with flat trend ,  Patient says she was taking inhaler as prescribed    patient says she did have cardiac cath in 2019 was told to be normal , patient does not see any cardiologist  , patient lives in shelter     2/18/25: Pt resting comfortable. Denies cp.  SOB improving. Denies HA, + lightheadedness at times. Tele: SB-RSR 50's-60's  2/19/25: pt. walking around the room, no SOB, will schedule chemical stress test for tomorrow, refuses tele   2/20/25: could not tolerate stress test due to wheezing     Home Medications:  metoprolol succinate 200 mg oral tablet, extended release: 1 tab(s) orally once a day (16 Feb 2025 15:30)  rosuvastatin 10 mg oral tablet: 1 tab(s) orally once a day (16 Feb 2025 15:30)  Singulair 10 mg oral tablet: 1 tab(s) orally once a day (16 Feb 2025 15:30)  valsartan 320 mg oral tablet: 1 tab(s) orally once a day (16 Feb 2025 15:30)  verapamil 360 mg/24 hours oral capsule, extended release: 1 cap(s) orally once a day (16 Feb 2025 15:31)    MEDICATIONS  (STANDING):  aspirin  chewable 81 milliGRAM(s) Oral daily  chlorhexidine 4% Liquid 1 Application(s) Topical <User Schedule>  cloNIDine 0.3 milliGRAM(s) Oral three times a day  enoxaparin Injectable 40 milliGRAM(s) SubCutaneous every 24 hours  metoprolol succinate  milliGRAM(s) Oral daily  montelukast 10 milliGRAM(s) Oral daily  nicotine -  14 mG/24Hr(s) Patch 1 Patch Transdermal daily  oseltamivir 75 milliGRAM(s) Oral every 12 hours  rosuvastatin 10 milliGRAM(s) Oral at bedtime  valsartan 320 milliGRAM(s) Oral daily  verapamil  milliGRAM(s) Oral daily      MEDICATIONS  (PRN):  acetaminophen     Tablet .. 650 milliGRAM(s) Oral every 6 hours PRN Temp greater or equal to 38C (100.4F)  aluminum hydroxide/magnesium hydroxide/simethicone Suspension 30 milliLiter(s) Oral every 4 hours PRN Dyspepsia  enalaprilat Injectable 1.25 milliGRAM(s) IV Push every 6 hours PRN sbp> 160mmhg  guaiFENesin ER 1200 milliGRAM(s) Oral every 12 hours PRN cough/congestion  melatonin 3 milliGRAM(s) Oral at bedtime PRN Insomnia  ondansetron Injectable 4 milliGRAM(s) IV Push every 8 hours PRN Nausea and/or Vomiting    Vital Signs Last 24 Hrs  T(C): 36.5 (20 Feb 2025 05:00), Max: 36.8 (20 Feb 2025 00:47)  T(F): 97.7 (20 Feb 2025 05:00), Max: 98.2 (20 Feb 2025 00:47)  HR: 62 (20 Feb 2025 08:43) (52 - 98)  BP: 176/98 (20 Feb 2025 08:43) (124/55 - 183/95)  BP(mean): --  RR: 18 (20 Feb 2025 08:43) (18 - 18)  SpO2: 99% (20 Feb 2025 08:43) (97% - 99%)    Parameters below as of 20 Feb 2025 08:43  Patient On (Oxygen Delivery Method): room air    PHYSICAL EXAM:    Constitutional: NAD, awake and alert, well-developed  HEENT: PERR, EOMI,  No oral cyananosis.  Neck:  supple,  No JVD  Respiratory: Breath sounds with exp wheeze   Cardiovascular: S1 and S2, regular rate and rhythm, no Murmurs, gallops or rubs  Gastrointestinal: Bowel Sounds present, soft, nontender.   Extremities: No peripheral edema. No clubbing or cyanosis.  Vascular: 2+ peripheral pulses  Neurological: A/O x 3, no focal deficits  Musculoskeletal: no calf tenderness.  Skin: No rashes.      LABS: All Labs Reviewed:                         13.8   7.41  )-----------( 170      ( 20 Feb 2025 06:52 )             43.7     02-20    141  |  108  |  16  ----------------------------<  139[H]  4.2   |  27  |  0.91    Ca    8.8      20 Feb 2025 06:52      - TroponinI hsT: <-102.81, <-102.04, <-102.91, <-107.85    RADIOLOGY/EKG:  < from: Xray Chest 1 View- PORTABLE-Urgent (Xray Chest 1 View- PORTABLE-Urgent .) (02.16.25 @ 12:54) >  IMPRESSION: No focal consolidation is seen.      < from: Xray Chest 1 View- PORTABLE-Urgent (Xray Chest 1 View- PORTABLE-Urgent .) (02.16.25 @ 12:54) >  IMPRESSION: No focal consolidation is seen.      < end of copied text >    < end of copied text >  < from: Xray Chest 2 Views PA/Lat (02.14.25 @ 21:54) >  RESSION:  No focal consolidation is seen.    < end of copied text >  < from: 12 Lead ECG (02.16.25 @ 10:03) >  Diagnosis Line Sinus rhythm withPremature atrial complexes  Possible Left atrial enlargement  Minimal voltage criteria for LVH, may be normal variant ( [place] product )  T wave abnormality, consider lateral ischemia  Abnormal ECG  When compared with ECG of 14-FEB-2025 21:25,  Premature atrial complexes are now Present  Confirmed by ALEN JERONIMO ([id number]) on 2/16/2025 2:19:32 PM    < end of copied text >  < from: 12 Lead ECG (02.14.25 @ 21:25) >  Diagnosis Line Normal sinus rhythm  Possible Left atrial enlargement  Minimal voltage criteria for LVH, may be normal variant ( [place] product )  T wave abnormality, consider lateral ischemia  Abnormal ECG  No previous ECGs available  Confirmed by ALEN JERONIMO ([id number]) on 2/16/2025 12:17:23 AM    < end of copied text >    < from: TTE W or WO Ultrasound Enhancing Agent (02.17.25 @ 12:03) >  CONCLUSIONS:      1. Left ventricular wall thickness is moderately increased. Left ventricular systolic function is normal with an ejection fraction visually estimated at 55 to 60%.   2. Normal right ventricular cavity size and normal right ventricular systolic function.   3. Left atrium is moderately dilated.   4. Mild mitral regurgitation.   5. Moderate to severe left ventricular hypertrophy.    < end of copied text >

## 2025-02-20 NOTE — DISCHARGE NOTE PROVIDER - CARE PROVIDER_API CALL
Ruth Scruggs  Family Medicine  [address] [place] [address]  Phone: [phone]  Fax: [phone]  Follow Up Time: 1 week

## 2025-02-20 NOTE — DISCHARGE NOTE PROVIDER - NSDCCPCAREPLAN_GEN_ALL_CORE_FT
PRINCIPAL DISCHARGE DIAGNOSIS  Diagnosis: Uncontrolled hypertension  Assessment and Plan of Treatment: WHAT IS HYPERTENSION? Hypertension (HTN) is high blood pressure. Hypertension causes your blood pressure to get so high that your heart has to work much harder than normal. This can damage your heart. Even if you have hypertension for years, lifestyle changes, medicines, or both can help bring your blood pressure to normal.  THINGS TO DO: (1) Take your medications as directed (2) Check your blood pressure at home (3) Manage other health conditions you may have (4) Lifestyle changes like exercise and limiting sodium in your diet (5) Decrease stress (6) Limit alcohol intake. Alcohol can increase your blood pressure (7) Do not smoke. Nicotine and other chemicals in cigarettes and cigars can increase your blood pressure and also cause lung damage     MONITOR THESE SIGNS OR SYMPTOMS: (1) Chest pain (2) Squeezing, pressure, or pain in your chest (3) Shortness of breath (4) Nausea or vomiting (5) Lightheadedness or a sudden cold sweat (6) Confusion or have difficulty speaking (7) Severe headache or vision loss. If you experience any of these, DO alert your primary care provider, or return to the Emergency Department if you feel very sick.      SECONDARY DISCHARGE DIAGNOSES  Diagnosis: Troponin I above reference range  Assessment and Plan of Treatment:     Diagnosis: Influenza  Assessment and Plan of Treatment:

## 2025-02-20 NOTE — DISCHARGE NOTE PROVIDER - ATTENDING DISCHARGE PHYSICAL EXAMINATION:
Gen: No acute distress   HEENT:  pupils equal and reactive, EOMI,   NECK:   supple, no carotid bruits, No JVD  CV:  +S1, +S2, regular rate rhythm, no murmurs or rubs  RESP:   lungs clear to auscultation bilaterally, no wheezing, rales, rhonchi, good air entry bilaterally  GI:  abdomen soft, non-tender, non-distended, normal BS, no bruits, no abdominal masses, no palpable masses  MSK:   normal muscle tone, no atrophy, no rigidity, no contractions  EXT:  no clubbing, no cyanosis, no edema, no calf pain, swelling or erythema  VASCULAR:  pulses equal and symmetric in the upper and lower extremities  NEURO:  AAOX3, no focal neurological deficits, follows all commands, able to move extremities spontaneously  SKIN:  no ulcers, lesions or rashes

## 2025-02-20 NOTE — DISCHARGE NOTE PROVIDER - NSDCMRMEDTOKEN_GEN_ALL_CORE_FT
albuterol 90 mcg/inh inhalation aerosol: 2 puff(s) inhaled every 6 hours as needed for Respiratory Distress  aspirin 81 mg oral tablet, chewable: 1 tab(s) orally once a day  cloNIDine 0.3 mg oral tablet: 1 tab(s) orally 3 times a day  metoprolol succinate 200 mg oral tablet, extended release: 1 tab(s) orally once a day  rosuvastatin 10 mg oral tablet: 1 tab(s) orally once a day  Singulair 10 mg oral tablet: 1 tab(s) orally once a day  valsartan 320 mg oral tablet: 1 tab(s) orally once a day  verapamil 360 mg/24 hours oral capsule, extended release: 1 cap(s) orally once a day

## 2025-02-20 NOTE — PROGRESS NOTE ADULT - PROBLEM SELECTOR PLAN 2
·  Problem: Elevated troponin.   ·  Recommendation: as described above , possible demand related in setting of flu , copd , uncontrolled hypertension hypertensive heart disease , Echo NL LVF, Mod-Severe LVH
Attending Attestation (For Attendings USE Only)...

## 2025-02-20 NOTE — PROGRESS NOTE ADULT - NS ATTEND AMEND GEN_ALL_CORE FT
as above
as above
as above likely demand related ischemia in the hypertensive heart disease with acute influenza and uncontrolled hypertension   continue ecojamie , MAURICIO ,     can follow up as O P   discussed with dr Espinoza

## 2025-03-04 DIAGNOSIS — I51.7 CARDIOMEGALY: ICD-10-CM

## 2025-03-04 DIAGNOSIS — F17.200 NICOTINE DEPENDENCE, UNSPECIFIED, UNCOMPLICATED: ICD-10-CM

## 2025-03-04 DIAGNOSIS — R79.89 OTHER SPECIFIED ABNORMAL FINDINGS OF BLOOD CHEMISTRY: ICD-10-CM

## 2025-03-04 DIAGNOSIS — I10 ESSENTIAL (PRIMARY) HYPERTENSION: ICD-10-CM

## 2025-03-04 DIAGNOSIS — E78.5 HYPERLIPIDEMIA, UNSPECIFIED: ICD-10-CM

## 2025-03-04 DIAGNOSIS — J44.1 CHRONIC OBSTRUCTIVE PULMONARY DISEASE WITH (ACUTE) EXACERBATION: ICD-10-CM

## 2025-03-04 DIAGNOSIS — J10.1 INFLUENZA DUE TO OTHER IDENTIFIED INFLUENZA VIRUS WITH OTHER RESPIRATORY MANIFESTATIONS: ICD-10-CM

## 2025-03-04 DIAGNOSIS — I24.89 OTHER FORMS OF ACUTE ISCHEMIC HEART DISEASE: ICD-10-CM

## 2025-03-26 ENCOUNTER — EMERGENCY (EMERGENCY)
Facility: HOSPITAL | Age: 60
LOS: 0 days | Discharge: HOME | End: 2025-03-27
Attending: FAMILY MEDICINE
Payer: MEDICAID

## 2025-03-26 VITALS
OXYGEN SATURATION: 95 % | SYSTOLIC BLOOD PRESSURE: 144 MMHG | DIASTOLIC BLOOD PRESSURE: 99 MMHG | RESPIRATION RATE: 20 BRPM | HEART RATE: 75 BPM

## 2025-03-26 VITALS — HEIGHT: 65 IN

## 2025-03-26 DIAGNOSIS — I10 ESSENTIAL (PRIMARY) HYPERTENSION: ICD-10-CM

## 2025-03-26 DIAGNOSIS — Z91.018 ALLERGY TO OTHER FOODS: ICD-10-CM

## 2025-03-26 DIAGNOSIS — Z88.5 ALLERGY STATUS TO NARCOTIC AGENT: ICD-10-CM

## 2025-03-26 DIAGNOSIS — Z88.8 ALLERGY STATUS TO OTHER DRUGS, MEDICAMENTS AND BIOLOGICAL SUBSTANCES: ICD-10-CM

## 2025-03-26 DIAGNOSIS — Z59.01 SHELTERED HOMELESSNESS: ICD-10-CM

## 2025-03-26 DIAGNOSIS — R60.0 LOCALIZED EDEMA: ICD-10-CM

## 2025-03-26 DIAGNOSIS — Z88.0 ALLERGY STATUS TO PENICILLIN: ICD-10-CM

## 2025-03-26 DIAGNOSIS — R79.89 OTHER SPECIFIED ABNORMAL FINDINGS OF BLOOD CHEMISTRY: ICD-10-CM

## 2025-03-26 DIAGNOSIS — R05.1 ACUTE COUGH: ICD-10-CM

## 2025-03-26 DIAGNOSIS — F17.210 NICOTINE DEPENDENCE, CIGARETTES, UNCOMPLICATED: ICD-10-CM

## 2025-03-26 DIAGNOSIS — J45.909 UNSPECIFIED ASTHMA, UNCOMPLICATED: ICD-10-CM

## 2025-03-26 LAB
ALBUMIN SERPL ELPH-MCNC: 3.2 G/DL — LOW (ref 3.3–5)
ALP SERPL-CCNC: 110 U/L — SIGNIFICANT CHANGE UP (ref 40–120)
ALT FLD-CCNC: 19 U/L — SIGNIFICANT CHANGE UP (ref 12–78)
ANION GAP SERPL CALC-SCNC: 1 MMOL/L — LOW (ref 5–17)
AST SERPL-CCNC: 15 U/L — SIGNIFICANT CHANGE UP (ref 15–37)
BASOPHILS # BLD AUTO: 0.02 K/UL — SIGNIFICANT CHANGE UP (ref 0–0.2)
BASOPHILS NFR BLD AUTO: 0.2 % — SIGNIFICANT CHANGE UP (ref 0–2)
BILIRUB SERPL-MCNC: 0.4 MG/DL — SIGNIFICANT CHANGE UP (ref 0.2–1.2)
BUN SERPL-MCNC: 14 MG/DL — SIGNIFICANT CHANGE UP (ref 7–23)
CALCIUM SERPL-MCNC: 8.5 MG/DL — SIGNIFICANT CHANGE UP (ref 8.5–10.1)
CHLORIDE SERPL-SCNC: 107 MMOL/L — SIGNIFICANT CHANGE UP (ref 96–108)
CO2 SERPL-SCNC: 31 MMOL/L — SIGNIFICANT CHANGE UP (ref 22–31)
CREAT SERPL-MCNC: 0.89 MG/DL — SIGNIFICANT CHANGE UP (ref 0.5–1.3)
EGFR: 74 ML/MIN/1.73M2 — SIGNIFICANT CHANGE UP
EGFR: 74 ML/MIN/1.73M2 — SIGNIFICANT CHANGE UP
EOSINOPHIL # BLD AUTO: 0.21 K/UL — SIGNIFICANT CHANGE UP (ref 0–0.5)
EOSINOPHIL NFR BLD AUTO: 2.1 % — SIGNIFICANT CHANGE UP (ref 0–6)
FLUAV AG NPH QL: SIGNIFICANT CHANGE UP
FLUBV AG NPH QL: SIGNIFICANT CHANGE UP
GLUCOSE SERPL-MCNC: 121 MG/DL — HIGH (ref 70–99)
HCT VFR BLD CALC: 41.5 % — SIGNIFICANT CHANGE UP (ref 34.5–45)
HGB BLD-MCNC: 12.9 G/DL — SIGNIFICANT CHANGE UP (ref 11.5–15.5)
IMM GRANULOCYTES # BLD AUTO: 0.09 K/UL — HIGH (ref 0–0.07)
IMM GRANULOCYTES NFR BLD AUTO: 0.9 % — SIGNIFICANT CHANGE UP (ref 0–0.9)
LYMPHOCYTES # BLD AUTO: 1.11 K/UL — SIGNIFICANT CHANGE UP (ref 1–3.3)
LYMPHOCYTES NFR BLD AUTO: 11.1 % — LOW (ref 13–44)
MCHC RBC-ENTMCNC: 29.4 PG — SIGNIFICANT CHANGE UP (ref 27–34)
MCHC RBC-ENTMCNC: 31.1 G/DL — LOW (ref 32–36)
MCV RBC AUTO: 94.5 FL — SIGNIFICANT CHANGE UP (ref 80–100)
MONOCYTES # BLD AUTO: 0.77 K/UL — SIGNIFICANT CHANGE UP (ref 0–0.9)
MONOCYTES NFR BLD AUTO: 7.7 % — SIGNIFICANT CHANGE UP (ref 2–14)
NEUTROPHILS # BLD AUTO: 7.79 K/UL — HIGH (ref 1.8–7.4)
NEUTROPHILS NFR BLD AUTO: 78 % — HIGH (ref 43–77)
NRBC # BLD AUTO: 0 K/UL — SIGNIFICANT CHANGE UP (ref 0–0)
NRBC # FLD: 0 K/UL — SIGNIFICANT CHANGE UP (ref 0–0)
NRBC BLD AUTO-RTO: 0 /100 WBCS — SIGNIFICANT CHANGE UP (ref 0–0)
PLATELET # BLD AUTO: 214 K/UL — SIGNIFICANT CHANGE UP (ref 150–400)
PMV BLD: 9.9 FL — SIGNIFICANT CHANGE UP (ref 7–13)
POTASSIUM SERPL-MCNC: 4 MMOL/L — SIGNIFICANT CHANGE UP (ref 3.5–5.3)
POTASSIUM SERPL-SCNC: 4 MMOL/L — SIGNIFICANT CHANGE UP (ref 3.5–5.3)
PROT SERPL-MCNC: 6.8 GM/DL — SIGNIFICANT CHANGE UP (ref 6–8.3)
RBC # BLD: 4.39 M/UL — SIGNIFICANT CHANGE UP (ref 3.8–5.2)
RBC # FLD: 14.2 % — SIGNIFICANT CHANGE UP (ref 10.3–14.5)
RSV RNA NPH QL NAA+NON-PROBE: SIGNIFICANT CHANGE UP
SARS-COV-2 RNA SPEC QL NAA+PROBE: SIGNIFICANT CHANGE UP
SODIUM SERPL-SCNC: 139 MMOL/L — SIGNIFICANT CHANGE UP (ref 135–145)
SOURCE RESPIRATORY: SIGNIFICANT CHANGE UP
TROPONIN I, HIGH SENSITIVITY RESULT: 93.17 NG/L — HIGH
TROPONIN I, HIGH SENSITIVITY RESULT: 93.32 NG/L — HIGH
WBC # BLD: 9.99 K/UL — SIGNIFICANT CHANGE UP (ref 3.8–10.5)
WBC # FLD AUTO: 9.99 K/UL — SIGNIFICANT CHANGE UP (ref 3.8–10.5)

## 2025-03-26 PROCEDURE — 71045 X-RAY EXAM CHEST 1 VIEW: CPT

## 2025-03-26 PROCEDURE — 93005 ELECTROCARDIOGRAM TRACING: CPT

## 2025-03-26 PROCEDURE — 71045 X-RAY EXAM CHEST 1 VIEW: CPT | Mod: 26

## 2025-03-26 PROCEDURE — 99285 EMERGENCY DEPT VISIT HI MDM: CPT | Mod: 25

## 2025-03-26 PROCEDURE — 0241U: CPT

## 2025-03-26 PROCEDURE — 80053 COMPREHEN METABOLIC PANEL: CPT

## 2025-03-26 PROCEDURE — 96374 THER/PROPH/DIAG INJ IV PUSH: CPT

## 2025-03-26 PROCEDURE — 36415 COLL VENOUS BLD VENIPUNCTURE: CPT

## 2025-03-26 PROCEDURE — 99285 EMERGENCY DEPT VISIT HI MDM: CPT

## 2025-03-26 PROCEDURE — 93010 ELECTROCARDIOGRAM REPORT: CPT

## 2025-03-26 PROCEDURE — 84484 ASSAY OF TROPONIN QUANT: CPT

## 2025-03-26 PROCEDURE — 85025 COMPLETE CBC W/AUTO DIFF WBC: CPT

## 2025-03-26 RX ORDER — LABETALOL HYDROCHLORIDE 200 MG/1
10 TABLET, FILM COATED ORAL ONCE
Refills: 0 | Status: COMPLETED | OUTPATIENT
Start: 2025-03-26 | End: 2025-03-26

## 2025-03-26 RX ORDER — ONDANSETRON HCL/PF 4 MG/2 ML
4 VIAL (ML) INJECTION ONCE
Refills: 0 | Status: COMPLETED | OUTPATIENT
Start: 2025-03-26 | End: 2025-03-26

## 2025-03-26 RX ADMIN — Medication 0.3 MILLIGRAM(S): at 21:18

## 2025-03-26 RX ADMIN — Medication 4 MILLIGRAM(S): at 19:03

## 2025-03-26 SDOH — ECONOMIC STABILITY - HOUSING INSECURITY: SHELTERED HOMELESSNESS: Z59.01

## 2025-03-26 NOTE — ED PROVIDER NOTE - NSFOLLOWUPINSTRUCTIONS_ED_ALL_ED_FT
You are signing against medica advice and are refusing admission. You have abnormal cardiac enzymes and understand the risks of leaving.  you are welcome to return at any time if you change your mind. Please continue to take your blood pressure medication as prescribed. Return to ER if worse. Follow up with your doctor/cardiologist.

## 2025-03-26 NOTE — ED ADULT NURSE NOTE - CHIEF COMPLAINT QUOTE
PT presents to er with complaints of hypertension, states she was sent in from [facility] shelter due to high reading, denies chest pain, dizziness, headache at this time.

## 2025-03-26 NOTE — ED PROVIDER NOTE - PATIENT PORTAL LINK FT
You can access the FollowMyHealth Patient Portal offered by [facility] by registering at the following website: [email or web address]. By joining [facility]’s FollowMyHealth portal, you will also be able to view your health information using other applications (apps) compatible with our system.

## 2025-03-26 NOTE — ED ADULT NURSE REASSESSMENT NOTE - NS ED NURSE REASSESS COMMENT FT1
Pt verbalized to MD Dooley that she always has high blood pressure & she feels fine. Pt denies chest pain, SOB or any discomforts. Pt states she wishes to leave AMA & continue to take her perscribed blood pressure meds. Pt provided AMA paperwork, ambulated with steady gate, no distress noted.

## 2025-03-26 NOTE — ED ADULT NURSE NOTE - OBJECTIVE STATEMENT
pt presented to er c/o hypertension. pt reports she lives at [facility] and was administered her BP medications late. hx HTN, asthma, and daily smoker. denies chest pain, headache, vision changes, dizziness, SOB. safety and comfort measures in place.

## 2025-03-26 NOTE — ED PROVIDER NOTE - CLINICAL SUMMARY MEDICAL DECISION MAKING FREE TEXT BOX
Pt with hx of HTN, got her HTN meds late today and was sent over for HTN. Pt states she is not feeling well today with nausea and dry heaves. Pt denies abdominal pain or chest pain. Will proceed with Zofran, EKG and CXR. Will observe initially to see if BP will go down on its own.

## 2025-03-26 NOTE — ED ADULT NURSE NOTE - NSFALLUNIVINTERV_ED_ALL_ED
Bed/Stretcher in lowest position, wheels locked, appropriate side rails in place/Call bell, personal items and telephone in reach/Instruct patient to call for assistance before getting out of bed/chair/stretcher/Non-slip footwear applied when patient is off stretcher/[place] to call system/Physically safe environment - no spills, clutter or unnecessary equipment/Purposeful proactive rounding/Room/bathroom lighting operational, light cord in reach

## 2025-03-26 NOTE — ED ADULT NURSE REASSESSMENT NOTE - NS ED NURSE REASSESS COMMENT FT1
Report received by SHIMA Pan. Pt attached to cardiac monitor, denies any pain or discomfort at this time. Safety & comfort measures maintained.

## 2025-03-26 NOTE — ED PROVIDER NOTE - CARE PLAN
Principal Discharge DX:	Elevated blood pressure reading with diagnosis of hypertension  Secondary Diagnosis:	Elevated troponin   1

## 2025-03-26 NOTE — ED PROVIDER NOTE - OBJECTIVE STATEMENT
58 y/o female with of PMHx of asthma on albuterol inhaler and HTN on metoprolol, clonidine and valsartan presents to the ED sent from [facility] for hypertension. Pt states [facility] aids gave her, her blood pressure medication late today, at 1pm. Pt denies SOB, chest pain, abdominal pain, headache or vomiting. Pt reports feeling "queasy", being lightheaded, dry heaving and having a cough today. Pt reports albuterol inhaler was last taken yesterday. Pt states her BP was normal when she saw PCP Dr. Scruggs in [place] yesterday for routine check up.  Pt endorses daily smoking, 3 cigarettes/day. Pt denies EtOH or drug use.

## 2025-03-26 NOTE — ED PROVIDER NOTE - PROGRESS NOTE DETAILS
I, Nadia De La Paz, attest that this documentation has been prepared under the direction and in the presence of Doctor Deann.    Spoke to pt as her troponin came back at 93 which was abnormal. Pt's chart review shows similar occurrence of high troponin due to BP elevation in the past. Pt is aware that this can be due to increase in BP or due to cardiac event. Pt was advised admission to the hospital. Pt is refusing and states that she will be kicked out of [facility] if she stays as she is close to getting housing. I explained the risks including death and pt will sign AMA.

## 2025-03-26 NOTE — ED PROVIDER NOTE - CONSTITUTIONAL, MLM
normal... Well appearing, awake, alert, oriented to person, place, time/situation and in no apparent distress, obses

## 2025-03-27 ENCOUNTER — EMERGENCY (EMERGENCY)
Facility: HOSPITAL | Age: 60
LOS: 0 days | Discharge: LEFT BEFORE DISCHARGE | End: 2025-03-27
Attending: EMERGENCY MEDICINE
Payer: MEDICAID

## 2025-03-27 VITALS
TEMPERATURE: 99 F | OXYGEN SATURATION: 94 % | DIASTOLIC BLOOD PRESSURE: 107 MMHG | SYSTOLIC BLOOD PRESSURE: 159 MMHG | HEART RATE: 74 BPM | RESPIRATION RATE: 20 BRPM

## 2025-03-27 VITALS
OXYGEN SATURATION: 96 % | RESPIRATION RATE: 16 BRPM | HEART RATE: 67 BPM | TEMPERATURE: 99 F | SYSTOLIC BLOOD PRESSURE: 182 MMHG | DIASTOLIC BLOOD PRESSURE: 90 MMHG

## 2025-03-27 DIAGNOSIS — Z53.29 PROCEDURE AND TREATMENT NOT CARRIED OUT BECAUSE OF PATIENT'S DECISION FOR OTHER REASONS: ICD-10-CM

## 2025-03-27 DIAGNOSIS — Z88.8 ALLERGY STATUS TO OTHER DRUGS, MEDICAMENTS AND BIOLOGICAL SUBSTANCES: ICD-10-CM

## 2025-03-27 DIAGNOSIS — R79.89 OTHER SPECIFIED ABNORMAL FINDINGS OF BLOOD CHEMISTRY: ICD-10-CM

## 2025-03-27 DIAGNOSIS — Z88.0 ALLERGY STATUS TO PENICILLIN: ICD-10-CM

## 2025-03-27 DIAGNOSIS — Z88.6 ALLERGY STATUS TO ANALGESIC AGENT: ICD-10-CM

## 2025-03-27 DIAGNOSIS — Z91.018 ALLERGY TO OTHER FOODS: ICD-10-CM

## 2025-03-27 DIAGNOSIS — Z88.5 ALLERGY STATUS TO NARCOTIC AGENT: ICD-10-CM

## 2025-03-27 PROCEDURE — 99282 EMERGENCY DEPT VISIT SF MDM: CPT

## 2025-03-27 PROCEDURE — 99283 EMERGENCY DEPT VISIT LOW MDM: CPT

## 2025-03-27 NOTE — ED ADULT TRIAGE NOTE - GLASGOW COMA SCALE: EYE OPENING, MLM
Subjective:          Chief Complaint: Kuldip Espinal is a 78 y.o. male who had concerns including Disease Management.    HPI:      Very pleasant 74y/o gentleman referred for + GEORGES 1:1280 (ho) and idopathic urticaria. Treated with HCQ    No ASE. Labs for ANCA negative. ESR WNL,  3 weeks ago with increase pruritis and faint rash medial UE and left ABD. Has not had this in many months. He recalls problems with idiopathic hives since childhood,   Never changes his soaps.   He has no vasculitic lesion.     Hx of eczema which I think is what is remaining at this time.   Did have biopsy with Dr. Any Vitale/Dr. Jaramillo - he does have triamcinolone and cetaphil which he is not using as often. Reduces duration of shower/mild heat.   Dr. Gastelum did biopsy (urticaria) on right arm. - questioned any drug to attribute this to. But stopped meds and not difference.   Dr. Rogel-Immunogy/ seen twice considering Xolair not seen in a few years.   Asking if he should have another visit with Immunology.   He has been on topical steroids. Steroids oral did help flares when comes off    Interval events: patient with acute joint swelling, right dorsum hand and then some swelling left hand. He was treated initially with ABX then with Medrol and after few weeks resolved.   Pending some blood work for gout vs OA. Imaging more suggestive of flare of OA. Previous serologies with me were negative for RA but given his hx agree with Dr. Mcpherson to check for this.   Patient did have scratch from dog but cannot explain the left hand which occurred days later.     No weakness, no fevers. Some chills. No SOB, no cough,   Irritation around his eyes itching and heavy but no rahs.     GEORGES 1-1280 homogeneous pattern with a negative GEORGES profile. He does note he has hx of eczema that is pruritic, typically on arms with scaling but denies erythema and severe scale. Rash seems to be exacerbated with stress, not sunlight.  Patient denies weight loss,  rashes, dry eye, dry mouth, nasal or palatal ulcerations,  lymphadenopathy, Raynaud's, hx of DVT/miscarriages, psoriasis or family hx of psoriasis, rashes, serositis, anemia or other constitutional symptoms.    Component      Latest Ref Rng & Units 2016   Anti Sm/RNP Antibody      0.00 - 19.99 EU 0.46   Anti-Sm/RNP Interpretation      Negative Negative   Anti Sm Antibody      0.00 - 19.99 EU 0.58   Anti-Sm Interpretation      Negative Negative   Anti-SSB Antibody      0.00 - 19.99 EU 0.18   Anti-SSB Interpretation      Negative Negative   Anti-SSA Antibody      0.00 - 19.99 EU 0.47   Anti-SSA Interpretation      Negative Negative   ds DNA Ab      Negative 1:10 Negative 1:10   Anti-Histone Antibody      0.0 - 0.9 Units 0.2   Complement (C-3)      50 - 180 mg/dL 151   Complement (C-4)      11 - 44 mg/dL 29   Complement,Total, Serum      54 - 144  129   Rheumatoid Factor      0.0 - 15.0 IU/mL <10.0   GEORGES HEP-2 Titer       Positive 1:1280 Homogeneous         REVIEW OF SYSTEMS:    Review of Systems   Constitutional: Negative for fever, malaise/fatigue and weight loss.   HENT: Negative for sore throat.    Eyes: Negative for double vision, photophobia and redness.   Respiratory: Negative for cough, shortness of breath and wheezing.    Cardiovascular: Negative for chest pain, palpitations and orthopnea.   Gastrointestinal: Negative for abdominal pain, constipation and diarrhea.   Genitourinary: Negative for dysuria, hematuria and urgency.   Musculoskeletal: Negative for back pain, joint pain and myalgias.   Skin: Positive for itching and rash.   Neurological: Negative for dizziness, tingling, focal weakness and headaches.   Endo/Heme/Allergies: Does not bruise/bleed easily.   Psychiatric/Behavioral: Negative for depression, hallucinations and suicidal ideas.               Objective:              Family History   Problem Relation Age of Onset    Cancer Mother     Heart disease Father     Hyperlipidemia Father      Hypertension Father      Social History     Tobacco Use    Smoking status: Former Smoker   Substance Use Topics    Alcohol use: Not on file    Drug use: Not on file         Current Outpatient Medications on File Prior to Visit   Medication Sig Dispense Refill    amLODIPine (NORVASC) 2.5 MG tablet Take 2.5 mg by mouth every morning.  3    aspirin (ECOTRIN) 81 MG EC tablet Take 1 tablet by mouth once daily.      blood sugar diagnostic (BLOOD GLUCOSE TEST) Strp MARKIE MICROLET LANCETS MISC      blood sugar diagnostic Strp 1 strip by Misc.(Non-Drug; Combo Route) route before breakfast. Diagnosis code E11.9 100 strip 3    blood-glucose meter (BLOOD GLUCOSE MONITORING) kit Use as instructed 1 each 0    cetirizine (ZYRTEC) 10 MG tablet Take 10 mg by mouth every morning.  1    CIALIS 5 mg tablet Take 5 mg by mouth once daily.  99    CRESTOR 20 mg tablet Take 1 tablet by mouth once daily.      dutasteride (AVODART) 0.5 mg capsule TAKE 1 CAPSULE DAILY FOR 90 DAYS  3    hydrochlorothiazide (HYDRODIURIL) 25 MG tablet Take 1 tablet by mouth once daily.      hydrOXYchloroQUINE (PLAQUENIL) 200 mg tablet Take 1 tablet (200 mg total) by mouth once daily. 90 tablet 3    JANUVIA 50 mg Tab TAKE 1 TABLET (50 MG TOTAL) BY MOUTH ONCE DAILY. 90 tablet 1    lancets 30 gauge Misc USE1 LANCET BEFORE BREAKFAST.  3    lancets Misc 1 lancet by Misc.(Non-Drug; Combo Route) route before breakfast. Diagnosis code E11.9 100 each 3    multivitamin (ONE DAILY MULTIVITAMIN) per tablet Take 1 tablet by mouth once daily.      omega-3 acid ethyl esters (LOVAZA) 1 gram capsule Take 2 capsules by mouth 2 (two) times daily.      ramipril (ALTACE) 10 MG capsule Take 10 mg by mouth 2 (two) times daily.  2    vitamin A-vitamin C-vit E-min Tab OCUVITE EYE HEALTH FORMULA CAPS      fluocinolone (SYNALAR) 0.01 % Sham Apply topically once daily. 120 mL 1     No current facility-administered medications on file prior to visit.        Vitals:     08/13/20 1014   BP: 133/74   Pulse: 67       Physical Exam:    Physical Exam   Constitutional: He is oriented to person, place, and time. He appears well-developed and well-nourished.   HENT:   Head: Normocephalic.   Right Ear: Hearing normal.   Left Ear: Hearing normal.   Nose: No rhinorrhea.   Mouth/Throat: Uvula is midline, oropharynx is clear and moist and mucous membranes are normal.   Eyes: Pupils are equal, round, and reactive to light. Conjunctivae and EOM are normal.   Cardiovascular: Normal rate, regular rhythm and normal heart sounds.   Pulmonary/Chest: Effort normal and breath sounds normal.   Musculoskeletal:        Right shoulder: He exhibits normal range of motion, no tenderness and no swelling.        Left shoulder: He exhibits normal range of motion, no tenderness and no swelling.        Right wrist: He exhibits normal range of motion, no tenderness and no swelling.        Left wrist: He exhibits normal range of motion, no tenderness and no swelling.        Right knee: He exhibits normal range of motion and no swelling. No tenderness found.        Left knee: He exhibits normal range of motion and no swelling. No tenderness found.        Right ankle: He exhibits normal range of motion and no swelling. No tenderness.        Left ankle: He exhibits normal range of motion and no swelling. No tenderness.        Right hand: He exhibits normal range of motion, no tenderness and no swelling.        Left hand: He exhibits normal range of motion, no tenderness and no swelling.        Right foot: There is normal range of motion, no tenderness and no swelling.        Left foot: There is normal range of motion, no tenderness and no swelling.   Neurological: He is oriented to person, place, and time. He has normal strength.   Skin: Skin is warm, dry and intact.   No uriticarial rash of skin  Markedly improved skin and scalp.    Psychiatric: He has a normal mood and affect. His speech is normal and behavior is  normal. Cognition and memory are normal.                 Assessment:       No diagnosis found.       Plan:        There are no diagnoses linked to this encounter.   Idiopathic urticaria. Thus far did respond with HCQ, now x 3 weeks having some breakthrough.    -Follows annually with Dr. Washington -HCQ eye exam.   Well controlled overall with HCQ some recent new lesions.   Skin markedlyimproved   biopsy consistent with urticaria hypersensitivity in setting of +GEORGES and +RNP.     More concerned today about SOB was evaluated with cardiology but with COVID having trouble meeting with doctors and having procedures. Recommended patient to call his PCP.        Follow up in about 6 months (around 2/13/2021).        30min consultation with greater than 50% spent in counseling, chart review and coordination of care. All questions answered.             (E4) spontaneous

## 2025-03-27 NOTE — ED ADULT NURSE NOTE - OBJECTIVE STATEMENT
59 year old female who was seen in ER earlier and left AMA now returns as [facility] not accepting pt without D/C instructions. Denies CP, SOB

## 2025-03-27 NOTE — ED ADULT TRIAGE NOTE - CHIEF COMPLAINT QUOTE
Pt ambulatory to ED c/o leaving AMA and not being able to go back to [facility] without DC instructions. Pt has no medical complaints at this time. Respirations even and unlabored, no distress noted at this time.

## 2025-03-27 NOTE — ED PROVIDER NOTE - CLINICAL SUMMARY MEDICAL DECISION MAKING FREE TEXT BOX
pt was in ED earlier this evening for elevated blood pressure found to have elevated troponin, pt does not want further w/u but cannot return to shelter because she left w/o discharge papers.

## 2025-03-27 NOTE — ED PROVIDER NOTE - OBJECTIVE STATEMENT
58yo f was seen in this ED earlier this evening and left against medical advice, pt returned because she left without discharge papers and [facility] would not allow reentry w/o papers. pt still does not want further management of elevated troponin.

## 2025-03-27 NOTE — ED PROVIDER NOTE - PATIENT PORTAL LINK FT
You can access the FollowMyHealth Patient Portal offered by [facility] by registering at the following website: [email or web address]. By joining [employer]’s FollowMyHealth portal, you will also be able to view your health information using other applications (apps) compatible with our system.

## 2025-03-27 NOTE — ED ADULT NURSE NOTE - NSFALLUNIVINTERV_ED_ALL_ED
Bed/Stretcher in lowest position, wheels locked, appropriate side rails in place/Call bell, personal items and telephone in reach/Instruct patient to call for assistance before getting out of bed/chair/stretcher/Non-slip footwear applied when patient is off stretcher/[place] to call system/Physically safe environment - no spills, clutter or unnecessary equipment/Purposeful proactive rounding/Room/bathroom lighting operational, light cord in reach

## 2025-04-17 ENCOUNTER — INPATIENT (INPATIENT)
Facility: HOSPITAL | Age: 60
LOS: 4 days | Discharge: HOME | DRG: 140 | End: 2025-04-22
Attending: HOSPITALIST | Admitting: INTERNAL MEDICINE
Payer: MEDICAID

## 2025-04-17 VITALS — HEIGHT: 65 IN

## 2025-04-17 PROCEDURE — 99285 EMERGENCY DEPT VISIT HI MDM: CPT

## 2025-04-17 PROCEDURE — 93010 ELECTROCARDIOGRAM REPORT: CPT

## 2025-04-17 NOTE — ED ADULT TRIAGE NOTE - CHIEF COMPLAINT QUOTE
Pt presents ambulatory from [facility] with c/o Shortness of breath. Pt states she has had a cold x3 days and her breathing has gotten worse, pt states both albuterol and symbicort inhalers are empty. denies fevers. Hx Asthma & COPD. Pt taken for stat EKG.

## 2025-04-18 DIAGNOSIS — Z98.890 OTHER SPECIFIED POSTPROCEDURAL STATES: Chronic | ICD-10-CM

## 2025-04-18 DIAGNOSIS — J44.1 CHRONIC OBSTRUCTIVE PULMONARY DISEASE WITH (ACUTE) EXACERBATION: ICD-10-CM

## 2025-04-18 DIAGNOSIS — Z98.891 HISTORY OF UTERINE SCAR FROM PREVIOUS SURGERY: Chronic | ICD-10-CM

## 2025-04-18 LAB
A1C WITH ESTIMATED AVERAGE GLUCOSE RESULT: 6.9 % — HIGH (ref 4–5.6)
ALBUMIN SERPL ELPH-MCNC: 3 G/DL — LOW (ref 3.3–5)
ALP SERPL-CCNC: 122 U/L — HIGH (ref 40–120)
ALT FLD-CCNC: 11 U/L — LOW (ref 12–78)
ANION GAP SERPL CALC-SCNC: 3 MMOL/L — LOW (ref 5–17)
ANION GAP SERPL CALC-SCNC: 5 MMOL/L — SIGNIFICANT CHANGE UP (ref 5–17)
AST SERPL-CCNC: 7 U/L — LOW (ref 15–37)
BASE EXCESS BLDV CALC-SCNC: 3.2 MMOL/L — HIGH (ref -2–3)
BASOPHILS # BLD AUTO: 0.06 K/UL — SIGNIFICANT CHANGE UP (ref 0–0.2)
BASOPHILS NFR BLD AUTO: 0.5 % — SIGNIFICANT CHANGE UP (ref 0–2)
BILIRUB SERPL-MCNC: 0.2 MG/DL — SIGNIFICANT CHANGE UP (ref 0.2–1.2)
BUN SERPL-MCNC: 24 MG/DL — HIGH (ref 7–23)
BUN SERPL-MCNC: 27 MG/DL — HIGH (ref 7–23)
CALCIUM SERPL-MCNC: 8.8 MG/DL — SIGNIFICANT CHANGE UP (ref 8.5–10.1)
CALCIUM SERPL-MCNC: 9.2 MG/DL — SIGNIFICANT CHANGE UP (ref 8.5–10.1)
CHLORIDE SERPL-SCNC: 108 MMOL/L — SIGNIFICANT CHANGE UP (ref 96–108)
CHLORIDE SERPL-SCNC: 110 MMOL/L — HIGH (ref 96–108)
CO2 SERPL-SCNC: 26 MMOL/L — SIGNIFICANT CHANGE UP (ref 22–31)
CO2 SERPL-SCNC: 30 MMOL/L — SIGNIFICANT CHANGE UP (ref 22–31)
CREAT SERPL-MCNC: 1.35 MG/DL — HIGH (ref 0.5–1.3)
CREAT SERPL-MCNC: 1.36 MG/DL — HIGH (ref 0.5–1.3)
EGFR: 44 ML/MIN/1.73M2 — LOW
EGFR: 44 ML/MIN/1.73M2 — LOW
EGFR: 45 ML/MIN/1.73M2 — LOW
EGFR: 45 ML/MIN/1.73M2 — LOW
EOSINOPHIL # BLD AUTO: 0.26 K/UL — SIGNIFICANT CHANGE UP (ref 0–0.5)
EOSINOPHIL NFR BLD AUTO: 2.2 % — SIGNIFICANT CHANGE UP (ref 0–6)
ESTIMATED AVERAGE GLUCOSE: 151 MG/DL — HIGH (ref 68–114)
FLUAV AG NPH QL: SIGNIFICANT CHANGE UP
FLUBV AG NPH QL: SIGNIFICANT CHANGE UP
GAS PNL BLDV: SIGNIFICANT CHANGE UP
GLUCOSE SERPL-MCNC: 184 MG/DL — HIGH (ref 70–99)
GLUCOSE SERPL-MCNC: 308 MG/DL — HIGH (ref 70–99)
HCO3 BLDV-SCNC: 31 MMOL/L — HIGH (ref 22–29)
HCT VFR BLD CALC: 41 % — SIGNIFICANT CHANGE UP (ref 34.5–45)
HCT VFR BLD CALC: 42 % — SIGNIFICANT CHANGE UP (ref 34.5–45)
HGB BLD-MCNC: 12.8 G/DL — SIGNIFICANT CHANGE UP (ref 11.5–15.5)
HGB BLD-MCNC: 13.3 G/DL — SIGNIFICANT CHANGE UP (ref 11.5–15.5)
IMM GRANULOCYTES # BLD AUTO: 0.09 K/UL — HIGH (ref 0–0.07)
IMM GRANULOCYTES NFR BLD AUTO: 0.8 % — SIGNIFICANT CHANGE UP (ref 0–0.9)
LYMPHOCYTES # BLD AUTO: 2.93 K/UL — SIGNIFICANT CHANGE UP (ref 1–3.3)
LYMPHOCYTES NFR BLD AUTO: 24.7 % — SIGNIFICANT CHANGE UP (ref 13–44)
MAGNESIUM SERPL-MCNC: 2 MG/DL — SIGNIFICANT CHANGE UP (ref 1.6–2.6)
MCHC RBC-ENTMCNC: 29 PG — SIGNIFICANT CHANGE UP (ref 27–34)
MCHC RBC-ENTMCNC: 29.3 PG — SIGNIFICANT CHANGE UP (ref 27–34)
MCHC RBC-ENTMCNC: 31.2 G/DL — LOW (ref 32–36)
MCHC RBC-ENTMCNC: 31.7 G/DL — LOW (ref 32–36)
MCV RBC AUTO: 92.5 FL — SIGNIFICANT CHANGE UP (ref 80–100)
MCV RBC AUTO: 92.8 FL — SIGNIFICANT CHANGE UP (ref 80–100)
MONOCYTES # BLD AUTO: 0.97 K/UL — HIGH (ref 0–0.9)
MONOCYTES NFR BLD AUTO: 8.2 % — SIGNIFICANT CHANGE UP (ref 2–14)
NEUTROPHILS # BLD AUTO: 7.53 K/UL — HIGH (ref 1.8–7.4)
NEUTROPHILS NFR BLD AUTO: 63.6 % — SIGNIFICANT CHANGE UP (ref 43–77)
NRBC # BLD AUTO: 0 K/UL — SIGNIFICANT CHANGE UP (ref 0–0)
NRBC # BLD AUTO: 0 K/UL — SIGNIFICANT CHANGE UP (ref 0–0)
NRBC # FLD: 0 K/UL — SIGNIFICANT CHANGE UP (ref 0–0)
NRBC # FLD: 0 K/UL — SIGNIFICANT CHANGE UP (ref 0–0)
NRBC BLD AUTO-RTO: 0 /100 WBCS — SIGNIFICANT CHANGE UP (ref 0–0)
NRBC BLD AUTO-RTO: 0 /100 WBCS — SIGNIFICANT CHANGE UP (ref 0–0)
PCO2 BLDV: 58 MMHG — HIGH (ref 39–42)
PH BLDV: 7.33 — SIGNIFICANT CHANGE UP (ref 7.32–7.43)
PLATELET # BLD AUTO: 245 K/UL — SIGNIFICANT CHANGE UP (ref 150–400)
PLATELET # BLD AUTO: 257 K/UL — SIGNIFICANT CHANGE UP (ref 150–400)
PMV BLD: 10.5 FL — SIGNIFICANT CHANGE UP (ref 7–13)
PMV BLD: 10.6 FL — SIGNIFICANT CHANGE UP (ref 7–13)
PO2 BLDV: 85 MMHG — HIGH (ref 25–45)
POTASSIUM SERPL-MCNC: 3.8 MMOL/L — SIGNIFICANT CHANGE UP (ref 3.5–5.3)
POTASSIUM SERPL-MCNC: 4.3 MMOL/L — SIGNIFICANT CHANGE UP (ref 3.5–5.3)
POTASSIUM SERPL-SCNC: 3.8 MMOL/L — SIGNIFICANT CHANGE UP (ref 3.5–5.3)
POTASSIUM SERPL-SCNC: 4.3 MMOL/L — SIGNIFICANT CHANGE UP (ref 3.5–5.3)
PROT SERPL-MCNC: 7.1 GM/DL — SIGNIFICANT CHANGE UP (ref 6–8.3)
RBC # BLD: 4.42 M/UL — SIGNIFICANT CHANGE UP (ref 3.8–5.2)
RBC # BLD: 4.54 M/UL — SIGNIFICANT CHANGE UP (ref 3.8–5.2)
RBC # FLD: 13.8 % — SIGNIFICANT CHANGE UP (ref 10.3–14.5)
RBC # FLD: 13.8 % — SIGNIFICANT CHANGE UP (ref 10.3–14.5)
RSV RNA NPH QL NAA+NON-PROBE: SIGNIFICANT CHANGE UP
SAO2 % BLDV: 97 % — HIGH (ref 67–88)
SARS-COV-2 RNA SPEC QL NAA+PROBE: SIGNIFICANT CHANGE UP
SODIUM SERPL-SCNC: 139 MMOL/L — SIGNIFICANT CHANGE UP (ref 135–145)
SODIUM SERPL-SCNC: 143 MMOL/L — SIGNIFICANT CHANGE UP (ref 135–145)
SOURCE RESPIRATORY: SIGNIFICANT CHANGE UP
WBC # BLD: 11.43 K/UL — HIGH (ref 3.8–10.5)
WBC # BLD: 11.84 K/UL — HIGH (ref 3.8–10.5)
WBC # FLD AUTO: 11.43 K/UL — HIGH (ref 3.8–10.5)
WBC # FLD AUTO: 11.84 K/UL — HIGH (ref 3.8–10.5)

## 2025-04-18 PROCEDURE — 87070 CULTURE OTHR SPECIMN AEROBIC: CPT

## 2025-04-18 PROCEDURE — 85027 COMPLETE CBC AUTOMATED: CPT

## 2025-04-18 PROCEDURE — 36415 COLL VENOUS BLD VENIPUNCTURE: CPT

## 2025-04-18 PROCEDURE — 80048 BASIC METABOLIC PNL TOTAL CA: CPT

## 2025-04-18 PROCEDURE — 94640 AIRWAY INHALATION TREATMENT: CPT

## 2025-04-18 PROCEDURE — 83036 HEMOGLOBIN GLYCOSYLATED A1C: CPT

## 2025-04-18 PROCEDURE — 99223 1ST HOSP IP/OBS HIGH 75: CPT

## 2025-04-18 PROCEDURE — 73030 X-RAY EXAM OF SHOULDER: CPT | Mod: 26,LT

## 2025-04-18 PROCEDURE — 73030 X-RAY EXAM OF SHOULDER: CPT | Mod: LT

## 2025-04-18 PROCEDURE — 71045 X-RAY EXAM CHEST 1 VIEW: CPT | Mod: 26

## 2025-04-18 RX ORDER — IPRATROPIUM BROMIDE AND ALBUTEROL SULFATE .5; 2.5 MG/3ML; MG/3ML
3 SOLUTION RESPIRATORY (INHALATION) EVERY 6 HOURS
Refills: 0 | Status: DISCONTINUED | OUTPATIENT
Start: 2025-04-18 | End: 2025-04-22

## 2025-04-18 RX ORDER — METHYLPREDNISOLONE ACETATE 80 MG/ML
40 INJECTION, SUSPENSION INTRA-ARTICULAR; INTRALESIONAL; INTRAMUSCULAR; SOFT TISSUE EVERY 6 HOURS
Refills: 0 | Status: DISCONTINUED | OUTPATIENT
Start: 2025-04-18 | End: 2025-04-19

## 2025-04-18 RX ORDER — IPRATROPIUM BROMIDE AND ALBUTEROL SULFATE .5; 2.5 MG/3ML; MG/3ML
3 SOLUTION RESPIRATORY (INHALATION)
Refills: 0 | Status: COMPLETED | OUTPATIENT
Start: 2025-04-18 | End: 2025-04-18

## 2025-04-18 RX ORDER — MONTELUKAST SODIUM 10 MG/1
10 TABLET ORAL DAILY
Refills: 0 | Status: DISCONTINUED | OUTPATIENT
Start: 2025-04-18 | End: 2025-04-22

## 2025-04-18 RX ORDER — OXCARBAZEPINE 150 MG/1
1 TABLET, FILM COATED ORAL
Refills: 0 | DISCHARGE

## 2025-04-18 RX ORDER — LABETALOL HYDROCHLORIDE 200 MG/1
10 TABLET, FILM COATED ORAL ONCE
Refills: 0 | Status: COMPLETED | OUTPATIENT
Start: 2025-04-18 | End: 2025-04-18

## 2025-04-18 RX ORDER — GABAPENTIN 400 MG/1
1 CAPSULE ORAL
Refills: 0 | DISCHARGE

## 2025-04-18 RX ORDER — ALBUTEROL SULFATE 2.5 MG/3ML
2 VIAL, NEBULIZER (ML) INHALATION EVERY 4 HOURS
Refills: 0 | Status: DISCONTINUED | OUTPATIENT
Start: 2025-04-18 | End: 2025-04-22

## 2025-04-18 RX ORDER — ROSUVASTATIN CALCIUM 20 MG/1
10 TABLET, FILM COATED ORAL AT BEDTIME
Refills: 0 | Status: DISCONTINUED | OUTPATIENT
Start: 2025-04-18 | End: 2025-04-22

## 2025-04-18 RX ORDER — MAGNESIUM SULFATE 500 MG/ML
2 SYRINGE (ML) INJECTION ONCE
Refills: 0 | Status: COMPLETED | OUTPATIENT
Start: 2025-04-18 | End: 2025-04-18

## 2025-04-18 RX ORDER — HYDROXYZINE HYDROCHLORIDE 25 MG/1
1 TABLET, FILM COATED ORAL
Refills: 0 | DISCHARGE

## 2025-04-18 RX ORDER — BUDESONIDE AND FORMOTEROL FUMARATE DIHYDRATE 80; 4.5 UG/1; UG/1
2 AEROSOL RESPIRATORY (INHALATION)
Refills: 0 | DISCHARGE

## 2025-04-18 RX ORDER — METHYLPREDNISOLONE ACETATE 80 MG/ML
125 INJECTION, SUSPENSION INTRA-ARTICULAR; INTRALESIONAL; INTRAMUSCULAR; SOFT TISSUE ONCE
Refills: 0 | Status: COMPLETED | OUTPATIENT
Start: 2025-04-18 | End: 2025-04-18

## 2025-04-18 RX ORDER — ACETAMINOPHEN 500 MG/5ML
650 LIQUID (ML) ORAL EVERY 6 HOURS
Refills: 0 | Status: DISCONTINUED | OUTPATIENT
Start: 2025-04-18 | End: 2025-04-22

## 2025-04-18 RX ORDER — SODIUM CHLORIDE 9 G/1000ML
1000 INJECTION, SOLUTION INTRAVENOUS
Refills: 0 | Status: DISCONTINUED | OUTPATIENT
Start: 2025-04-18 | End: 2025-04-22

## 2025-04-18 RX ORDER — NICOTINE POLACRILEX 4 MG/1
1 GUM, CHEWING ORAL DAILY
Refills: 0 | Status: DISCONTINUED | OUTPATIENT
Start: 2025-04-18 | End: 2025-04-22

## 2025-04-18 RX ORDER — METOPROLOL SUCCINATE 50 MG/1
200 TABLET, EXTENDED RELEASE ORAL DAILY
Refills: 0 | Status: DISCONTINUED | OUTPATIENT
Start: 2025-04-18 | End: 2025-04-22

## 2025-04-18 RX ORDER — ASPIRIN 325 MG
81 TABLET ORAL DAILY
Refills: 0 | Status: DISCONTINUED | OUTPATIENT
Start: 2025-04-18 | End: 2025-04-22

## 2025-04-18 RX ORDER — ONDANSETRON HCL/PF 4 MG/2 ML
4 VIAL (ML) INJECTION EVERY 8 HOURS
Refills: 0 | Status: DISCONTINUED | OUTPATIENT
Start: 2025-04-18 | End: 2025-04-22

## 2025-04-18 RX ORDER — ENOXAPARIN SODIUM 100 MG/ML
40 INJECTION SUBCUTANEOUS EVERY 24 HOURS
Refills: 0 | Status: DISCONTINUED | OUTPATIENT
Start: 2025-04-18 | End: 2025-04-22

## 2025-04-18 RX ORDER — ISOSORBIDE MONONITRATE 60 MG/1
30 TABLET, EXTENDED RELEASE ORAL DAILY
Refills: 0 | Status: DISCONTINUED | OUTPATIENT
Start: 2025-04-18 | End: 2025-04-20

## 2025-04-18 RX ORDER — MELATONIN 5 MG
3 TABLET ORAL AT BEDTIME
Refills: 0 | Status: DISCONTINUED | OUTPATIENT
Start: 2025-04-18 | End: 2025-04-22

## 2025-04-18 RX ORDER — LIDOCAINE HYDROCHLORIDE 20 MG/ML
2 JELLY TOPICAL DAILY
Refills: 0 | Status: DISCONTINUED | OUTPATIENT
Start: 2025-04-18 | End: 2025-04-22

## 2025-04-18 RX ORDER — ARIPIPRAZOLE 2 MG/1
1 TABLET ORAL
Refills: 0 | DISCHARGE

## 2025-04-18 RX ORDER — ALBUTEROL SULFATE 2.5 MG/3ML
2.5 VIAL, NEBULIZER (ML) INHALATION ONCE
Refills: 0 | Status: COMPLETED | OUTPATIENT
Start: 2025-04-18 | End: 2025-04-18

## 2025-04-18 RX ORDER — MAGNESIUM, ALUMINUM HYDROXIDE 200-200 MG
30 TABLET,CHEWABLE ORAL EVERY 4 HOURS
Refills: 0 | Status: DISCONTINUED | OUTPATIENT
Start: 2025-04-18 | End: 2025-04-22

## 2025-04-18 RX ADMIN — Medication 360 MILLIGRAM(S): at 08:05

## 2025-04-18 RX ADMIN — Medication 320 MILLIGRAM(S): at 12:35

## 2025-04-18 RX ADMIN — IPRATROPIUM BROMIDE AND ALBUTEROL SULFATE 3 MILLILITER(S): .5; 2.5 SOLUTION RESPIRATORY (INHALATION) at 00:50

## 2025-04-18 RX ADMIN — METOPROLOL SUCCINATE 200 MILLIGRAM(S): 50 TABLET, EXTENDED RELEASE ORAL at 08:05

## 2025-04-18 RX ADMIN — Medication 2 MILLIGRAM(S): at 13:37

## 2025-04-18 RX ADMIN — ROSUVASTATIN CALCIUM 10 MILLIGRAM(S): 20 TABLET, FILM COATED ORAL at 23:15

## 2025-04-18 RX ADMIN — LABETALOL HYDROCHLORIDE 10 MILLIGRAM(S): 200 TABLET, FILM COATED ORAL at 15:39

## 2025-04-18 RX ADMIN — METHYLPREDNISOLONE ACETATE 125 MILLIGRAM(S): 80 INJECTION, SUSPENSION INTRA-ARTICULAR; INTRALESIONAL; INTRAMUSCULAR; SOFT TISSUE at 00:24

## 2025-04-18 RX ADMIN — Medication 150 GRAM(S): at 00:25

## 2025-04-18 RX ADMIN — SODIUM CHLORIDE 125 MILLILITER(S): 9 INJECTION, SOLUTION INTRAVENOUS at 06:38

## 2025-04-18 RX ADMIN — Medication 0.3 MILLIGRAM(S): at 06:05

## 2025-04-18 RX ADMIN — METHYLPREDNISOLONE ACETATE 40 MILLIGRAM(S): 80 INJECTION, SUSPENSION INTRA-ARTICULAR; INTRALESIONAL; INTRAMUSCULAR; SOFT TISSUE at 23:52

## 2025-04-18 RX ADMIN — LABETALOL HYDROCHLORIDE 10 MILLIGRAM(S): 200 TABLET, FILM COATED ORAL at 16:53

## 2025-04-18 RX ADMIN — IPRATROPIUM BROMIDE AND ALBUTEROL SULFATE 3 MILLILITER(S): .5; 2.5 SOLUTION RESPIRATORY (INHALATION) at 08:57

## 2025-04-18 RX ADMIN — IPRATROPIUM BROMIDE AND ALBUTEROL SULFATE 3 MILLILITER(S): .5; 2.5 SOLUTION RESPIRATORY (INHALATION) at 14:06

## 2025-04-18 RX ADMIN — IPRATROPIUM BROMIDE AND ALBUTEROL SULFATE 3 MILLILITER(S): .5; 2.5 SOLUTION RESPIRATORY (INHALATION) at 00:31

## 2025-04-18 RX ADMIN — ISOSORBIDE MONONITRATE 30 MILLIGRAM(S): 60 TABLET, EXTENDED RELEASE ORAL at 16:54

## 2025-04-18 RX ADMIN — LIDOCAINE HYDROCHLORIDE 2 PATCH: 20 JELLY TOPICAL at 14:06

## 2025-04-18 RX ADMIN — Medication 0.2 MILLIGRAM(S): at 12:36

## 2025-04-18 RX ADMIN — Medication 0.3 MILLIGRAM(S): at 14:04

## 2025-04-18 RX ADMIN — METHYLPREDNISOLONE ACETATE 40 MILLIGRAM(S): 80 INJECTION, SUSPENSION INTRA-ARTICULAR; INTRALESIONAL; INTRAMUSCULAR; SOFT TISSUE at 06:11

## 2025-04-18 RX ADMIN — Medication 81 MILLIGRAM(S): at 12:35

## 2025-04-18 RX ADMIN — METHYLPREDNISOLONE ACETATE 40 MILLIGRAM(S): 80 INJECTION, SUSPENSION INTRA-ARTICULAR; INTRALESIONAL; INTRAMUSCULAR; SOFT TISSUE at 18:53

## 2025-04-18 RX ADMIN — Medication 0.3 MILLIGRAM(S): at 23:17

## 2025-04-18 RX ADMIN — IPRATROPIUM BROMIDE AND ALBUTEROL SULFATE 3 MILLILITER(S): .5; 2.5 SOLUTION RESPIRATORY (INHALATION) at 00:05

## 2025-04-18 RX ADMIN — LIDOCAINE HYDROCHLORIDE 2 PATCH: 20 JELLY TOPICAL at 19:04

## 2025-04-18 RX ADMIN — Medication 2.5 MILLIGRAM(S): at 02:00

## 2025-04-18 RX ADMIN — METHYLPREDNISOLONE ACETATE 40 MILLIGRAM(S): 80 INJECTION, SUSPENSION INTRA-ARTICULAR; INTRALESIONAL; INTRAMUSCULAR; SOFT TISSUE at 14:04

## 2025-04-18 NOTE — H&P ADULT - ASSESSMENT
The patient is a 59y old moderately obese Female with significant PMH of HTN, Asthma/COPD, active smoker not on home O2, Chronically elevated troponin due to demand ischemia/hypoxia, non-compliant to medications presented in the ED with c/o worsening shortness of breath and cough for the last 3 days.  She says that she has a lot of cough with yellowish sputum production. Sometimes, she feels cold and chills.  She continues to smoke 2-3 cig daily, but has cut it down from 1-1.5 packs of cig daily. Patient states she ran out of her home medications 2 days ago, and her PCP did not sent for refill or new prescription.  Otherwise, she denies fever, chest pain, palpitation, N/V, abd pain, diarrhea or dysuria.  She denies alcohol or substance abuse.    # Acute exacerbation of COPD and Asthma.  # Acute respiratory failure with hypoxia due to the above.  - Has mild leukocytosis. RVP negative.  CXR negative for infiltrates.  -Admit ot medical floor.  -Sputum culture.  -Duoneb and albuterol.  -Solumedrol 40 mg IV q6h x 2 days then taper.  -Levaquin 500 mg IV for 5 days.  -Robitussin-DM prn.  -Oxygen supplementation via NC and keep sats above 92%.    # MAXWELL.  -Cr 1.35 (was 0.89 on 03/26/2025).  -LR at 125 ml/hr for 2 L.  -Follow BMP.    # Acute hyperglycemia.  -. Denies h/o diabetes.  -Will get A1c level to r/o DM-2.    # HTN and Dyslipidemia.  -Stable and controlled.  -C/w her ASA, Metoprolol, Verapamil, Valsartan, Clonidine and Rosuvastatin.    # Tobacco abuse.  -Smoking cessation counseling.    # DVT prophylaxis: Lovenox sq daily.

## 2025-04-18 NOTE — PATIENT PROFILE ADULT - FALL HARM RISK - TYPE OF ASSESSMENT
Detail Level: Detailed Quality 431: Preventive Care And Screening: Unhealthy Alcohol Use - Screening: Patient screened for unhealthy alcohol use using a single question and scores less than 2 times per year Quality 226: Preventive Care And Screening: Tobacco Use: Screening And Cessation Intervention: Patient screened for tobacco use and is an ex/non-smoker Admission

## 2025-04-18 NOTE — H&P ADULT - HISTORY OF PRESENT ILLNESS
Chief Complaint: shortness of breath.    The patient is a 59y old moderately obese Female with significant PMH of HTN, Asthma/COPD, active smoker not on home O2, Chronically elevated troponin due to demand ischemia/hypoxia, non-compliant to medications presented in the ED with c/o worsening shortness of breath and cough for the last 3 days.  She says that she has a lot of cough with yellowish sputum production. Sometimes, she feels cold and chills.  She continues to smoke 2-3 cig daily, but has cut it down from 1-1.5 packs of cig daily. Patient states she ran out of her home medications 2 days ago, and her PCP did not sent for refill or new prescription.  Otherwise, she denies fever, chest pain, palpitation, N/V, abd pain, diarrhea or dysuria.  She denies alcohol or substance abuse.  Upon arrival in ED , her sats was 85% on RA, and was put on 3 L via NC, and had clearly audible wheezing.  She was given Duoneb and Solumedrol 125 mg IV and Mg 2 gm IV in ED, and now she is feeling a little better, but still has wheezing.     Sig labs: WBC 11.84k,Hb 12.8, BUN 24, Cr 1.35 (was 0.89 on 2025), , LFTs wnl.  Mg 2.0.  RVP negative.  CXR: Negative for infiltrates per my reading, but official report pending.  EK bpm in NSR.

## 2025-04-18 NOTE — PATIENT PROFILE ADULT - HISTORY OF COVID-19 VACCINATION
"Subjective:   Patient ID: Merle Caro is a 60 y.o. female.  Chief Complaint:  Shortness of Breath; Wheezing; and Hyperlipidemia      Appointment scheduled for 1 month follow-up on hyperlipidemia, however patient still has not picked up/ started statin as prescribed.      Complains of increased shortness of breath.  Saw pulmonology today.   [place] related to her recent fall and chest wall contusion with pain.   Did not feel related to her underlying lung disease.  X-ray ordered to rule out rib fracture.    Not done yet.    Patient reports compliance with Tudorza and Advair inhalers    Fall was caused by an accidental slip/tripped at home.  Landed on right sided chest wall and right knee.    Pain in both areas.  Reports slowly improving.    Review of Systems   Constitutional: Negative for chills, fatigue and fever.   Respiratory: Positive for shortness of breath (Hurts to take deep breath) and wheezing. Negative for cough and chest tightness.    Cardiovascular: Positive for chest pain (Pleuritic.  Right-side chest wall.). Negative for leg swelling.   Gastrointestinal: Negative for abdominal distention, abdominal pain, constipation, diarrhea, nausea and vomiting.   Genitourinary: Negative for difficulty urinating.   Musculoskeletal: Positive for arthralgias (Right knee), gait problem and joint swelling. Negative for myalgias.   Skin: Negative for rash.   Neurological: Negative for weakness and headaches.   Hematological: Bruises/bleeds easily.     Objective:   /82 (BP Location: Right arm, Patient Position: Sitting, BP Method: Large (Manual))   Pulse 93   Temp 98.2 °F (36.8 °C) (Tympanic)   Ht 5' 1" (1.549 m)   Wt 83.6 kg (184 lb 4.9 oz)   SpO2 (!) 94%   BMI 34.82 kg/m²     Physical Exam   Constitutional: Vital signs are normal. She appears well-developed and well-nourished. No distress.   Eyes: No scleral icterus.   Neck: No JVD present. Carotid bruit is not present. No thyroid mass and no thyromegaly " present.   Cardiovascular: Normal rate, regular rhythm and normal heart sounds. Exam reveals no gallop and no friction rub.   No murmur heard.  Pulmonary/Chest: Effort normal. No accessory muscle usage. No tachypnea. No respiratory distress. She has no decreased breath sounds. She has no wheezes. She has rhonchi in the right middle field, the right lower field, the left middle field and the left lower field. She has no rales. She exhibits bony tenderness (Right-sided T4-T6 area). She exhibits no crepitus, no edema and no swelling.   Abdominal: Soft. She exhibits no distension. There is no hepatosplenomegaly. There is no tenderness. There is no rebound and no guarding.   Musculoskeletal: She exhibits no edema.        Right knee: She exhibits ecchymosis. She exhibits normal range of motion, no swelling, no effusion, no laceration, no erythema and no bony tenderness. Tenderness found. Patellar tendon tenderness noted.   Skin: Skin is warm, dry and intact. No abrasion, no bruising, no ecchymosis and no rash noted.   Psychiatric: She has a normal mood and affect. Judgment normal.   Nursing note and vitals reviewed.    Assessment:       ICD-10-CM ICD-9-CM   1. Pure hypercholesterolemia E78.00 272.0   2. Rib pain on right side R07.81 786.50   3. Fall in home, initial encounter W19.XXXA E888.9    Y92.009 E849.0   4. Asthma with COPD J44.9 493.20     Plan:   Pure hypercholesterolemia  -     simvastatin (ZOCOR) 40 MG tablet; Take 1 tablet (40 mg total) by mouth every evening.  Dispense: 90 tablet; Refill: 0  Once again advised to start simvastatin 40 mg daily.    Follow up 1 month with repeat CMP and document compliance.      Rib pain on right side  Asthma with COPD  Check x-ray.  Rule out fracture.    Probable contusion  Clinically not any worsening or decompensation of her asthma/ COPD.      Fall in home, initial encounter  Slip /trip mechanism.  No loss consciousness   No direct head trauma.  If starts to have more  frequent falls, will order PT TO eval.      Return to clinic 1 month     Vaccine status unknown

## 2025-04-18 NOTE — ED ADULT NURSE REASSESSMENT NOTE - NS ED NURSE REASSESS COMMENT FT1
pt asleep oxygen saturation 85 pt placed on 1.5 liters Nasal cannula with improvement in saturation.

## 2025-04-18 NOTE — H&P ADULT - NSHPLABSRESULTS_GEN_ALL_CORE
LABS:                        12.8   11.84 )-----------( 257      ( 18 Apr 2025 00:20 )             41.0     04-18    143  |  110[H]  |  24[H]  ----------------------------<  184[H]  3.8   |  30  |  1.35[H]    Ca    8.8      18 Apr 2025 00:20  Mg     2.0     04-18    TPro  7.1  /  Alb  3.0[L]  /  TBili  0.2  /  DBili  x   /  AST  7[L]  /  ALT  11[L]  /  Alk Phos  122[H]  04-18

## 2025-04-18 NOTE — PHARMACOTHERAPY INTERVENTION NOTE - COMMENTS
Medication reconciliation completed.  Reviewed Medication list and confirmed med allergies with patient.  No meds shown on Dr. [facility], list faxed over by [facility]  of meds filled in past 3 months.

## 2025-04-18 NOTE — H&P ADULT - NSICDXPASTMEDICALHX_GEN_ALL_CORE_FT
PAST MEDICAL HISTORY:  Asthma     COPD (chronic obstructive pulmonary disease)     Elevated troponin I level     HTN (hypertension)

## 2025-04-18 NOTE — ED PROVIDER NOTE - CLINICAL SUMMARY MEDICAL DECISION MAKING FREE TEXT BOX
59-year-old female past medical history of asthma/COPD, active smoker not on O2, hypertension presents ED complaining worsening shortness of breath and cough for the last 3 days.  patient audibly wheezing, wheezing on lung exam, not hypoxic and hemodynamically stable and otherwise well-appearing.  Differential clues not limited to pneumonia versus COPD/asthma exacerbation.  Plan for screening labs, EKG, chest x-ray, nebs, steroids.  Reassess

## 2025-04-18 NOTE — ED ADULT NURSE REASSESSMENT NOTE - NS ED NURSE REASSESS COMMENT FT1
Report received from SHIMA Zhu. Pt resting comfortably in bed, denies any pain/ symptoms at this time, vital signs stable, awaiting CT. 2 L hung.

## 2025-04-18 NOTE — PATIENT PROFILE ADULT - FALL HARM RISK - HARM RISK INTERVENTIONS

## 2025-04-18 NOTE — ED ADULT NURSE NOTE - OBJECTIVE STATEMENT
58 y/o female presents to ED c/o SOB. Pt reports having cough, congestion for x3 days and worsening SOB. Pt reports running out of both inhalers. Denies chest pain, fevers, n/v. PMHx COPD, asthma.

## 2025-04-18 NOTE — ED PROVIDER NOTE - OBJECTIVE STATEMENT
59-year-old female past medical history of asthma/COPD, active smoker not on O2, hypertension presents ED complaining worsening shortness of breath and cough for the last 3 days.  Patient states she ran out of her home medications.  Patient denies chest pain, fever, nausea, vomiting, abdominal pain, dysuria, diarrhea.

## 2025-04-18 NOTE — ED PROVIDER NOTE - PHYSICAL EXAMINATION
GEN: Patient awake alert NAD.   HEENT: normocephalic, atraumatic, EOMI, no scleral icterus, moist MM  CARDIAC: RRR, S1, S2, no murmur.   PULM:   Audibly wheezing, diffuse wheezing bilaterally, no tachypnea or increased work of breathing  ABD: soft NT, ND, no rebound no guarding, no CVA tenderness.   MSK: Moving all extremities, no edema.   NEURO: A&Ox3, no focal neurological deficits,  SKIN: warm, dry, no rash.

## 2025-04-18 NOTE — H&P ADULT - NSHPPHYSICALEXAM_GEN_ALL_CORE
PHYSICAL EXAM:  GENERAL: NAD, lying in bed comfortably  HEAD:  Atraumatic, Normocephalic  EYES: EOMI, PERRLA, conjunctiva and sclera clear  ENT: Moist mucous membranes  NECK: Supple, No JVD  CHEST/LUNG: B/L coarse crepitations and wheezing present.. Unlabored respirations  HEART: Regular rate and rhythm; No murmurs, rubs, or gallops  ABDOMEN: Bowel sounds present; Soft, Nontender, Nondistended.   EXTREMITIES:  2+ Peripheral Pulses, brisk capillary refill. No clubbing, cyanosis, or edema  NERVOUS SYSTEM:  Alert & Oriented X3, speech clear. No deficits   MSK: FROM all 4 extremities, full and equal strength  SKIN: No rashes or lesions

## 2025-04-18 NOTE — ED ADULT NURSE NOTE - CHIEF COMPLAINT QUOTE
Pt presents ambulatory from [facility] with c/o Shortness of breath. Pt states she has had a cold x3 days and her breathing has gotten worse, pt states both albuterol and symbicort inhalers are empty. denies fevers. Hx Asthma & COPD. Pt taken for stat EKG.

## 2025-04-18 NOTE — CHART NOTE - NSCHARTNOTEFT_GEN_A_CORE
on exam- comfortable   -left trapezius muscle tightness noted   -rs- poor air entry , cta    #lidocaine patch on left shoulder, xray to follow     #counselled for smoking- nicotin patch

## 2025-04-19 LAB
ANION GAP SERPL CALC-SCNC: 5 MMOL/L — SIGNIFICANT CHANGE UP (ref 5–17)
BUN SERPL-MCNC: 21 MG/DL — SIGNIFICANT CHANGE UP (ref 7–23)
CALCIUM SERPL-MCNC: 9.1 MG/DL — SIGNIFICANT CHANGE UP (ref 8.5–10.1)
CHLORIDE SERPL-SCNC: 105 MMOL/L — SIGNIFICANT CHANGE UP (ref 96–108)
CO2 SERPL-SCNC: 26 MMOL/L — SIGNIFICANT CHANGE UP (ref 22–31)
CREAT SERPL-MCNC: 1.05 MG/DL — SIGNIFICANT CHANGE UP (ref 0.5–1.3)
EGFR: 60 ML/MIN/1.73M2 — SIGNIFICANT CHANGE UP
EGFR: 60 ML/MIN/1.73M2 — SIGNIFICANT CHANGE UP
GLUCOSE SERPL-MCNC: 272 MG/DL — HIGH (ref 70–99)
GRAM STN FLD: ABNORMAL
POTASSIUM SERPL-MCNC: 4.7 MMOL/L — SIGNIFICANT CHANGE UP (ref 3.5–5.3)
POTASSIUM SERPL-SCNC: 4.7 MMOL/L — SIGNIFICANT CHANGE UP (ref 3.5–5.3)
SODIUM SERPL-SCNC: 136 MMOL/L — SIGNIFICANT CHANGE UP (ref 135–145)
SPECIMEN SOURCE: SIGNIFICANT CHANGE UP

## 2025-04-19 PROCEDURE — 99233 SBSQ HOSP IP/OBS HIGH 50: CPT

## 2025-04-19 RX ORDER — METHYLPREDNISOLONE ACETATE 80 MG/ML
40 INJECTION, SUSPENSION INTRA-ARTICULAR; INTRALESIONAL; INTRAMUSCULAR; SOFT TISSUE DAILY
Refills: 0 | Status: DISCONTINUED | OUTPATIENT
Start: 2025-04-19 | End: 2025-04-22

## 2025-04-19 RX ORDER — BISACODYL 5 MG
10 TABLET, DELAYED RELEASE (ENTERIC COATED) ORAL ONCE
Refills: 0 | Status: COMPLETED | OUTPATIENT
Start: 2025-04-19 | End: 2025-04-19

## 2025-04-19 RX ADMIN — IPRATROPIUM BROMIDE AND ALBUTEROL SULFATE 3 MILLILITER(S): .5; 2.5 SOLUTION RESPIRATORY (INHALATION) at 08:32

## 2025-04-19 RX ADMIN — ROSUVASTATIN CALCIUM 10 MILLIGRAM(S): 20 TABLET, FILM COATED ORAL at 20:43

## 2025-04-19 RX ADMIN — METHYLPREDNISOLONE ACETATE 40 MILLIGRAM(S): 80 INJECTION, SUSPENSION INTRA-ARTICULAR; INTRALESIONAL; INTRAMUSCULAR; SOFT TISSUE at 06:30

## 2025-04-19 RX ADMIN — Medication 360 MILLIGRAM(S): at 08:09

## 2025-04-19 RX ADMIN — Medication 320 MILLIGRAM(S): at 08:11

## 2025-04-19 RX ADMIN — Medication 10 MILLIGRAM(S): at 20:55

## 2025-04-19 RX ADMIN — LIDOCAINE HYDROCHLORIDE 2 PATCH: 20 JELLY TOPICAL at 08:00

## 2025-04-19 RX ADMIN — Medication 0.3 MILLIGRAM(S): at 20:43

## 2025-04-19 RX ADMIN — METOPROLOL SUCCINATE 200 MILLIGRAM(S): 50 TABLET, EXTENDED RELEASE ORAL at 08:08

## 2025-04-19 RX ADMIN — LIDOCAINE HYDROCHLORIDE 2 PATCH: 20 JELLY TOPICAL at 08:11

## 2025-04-19 RX ADMIN — ISOSORBIDE MONONITRATE 30 MILLIGRAM(S): 60 TABLET, EXTENDED RELEASE ORAL at 08:09

## 2025-04-19 RX ADMIN — METHYLPREDNISOLONE ACETATE 40 MILLIGRAM(S): 80 INJECTION, SUSPENSION INTRA-ARTICULAR; INTRALESIONAL; INTRAMUSCULAR; SOFT TISSUE at 11:26

## 2025-04-19 RX ADMIN — Medication 81 MILLIGRAM(S): at 08:08

## 2025-04-19 RX ADMIN — IPRATROPIUM BROMIDE AND ALBUTEROL SULFATE 3 MILLILITER(S): .5; 2.5 SOLUTION RESPIRATORY (INHALATION) at 20:57

## 2025-04-19 RX ADMIN — NICOTINE POLACRILEX 1 PATCH: 4 GUM, CHEWING ORAL at 19:49

## 2025-04-19 RX ADMIN — MONTELUKAST SODIUM 10 MILLIGRAM(S): 10 TABLET ORAL at 08:08

## 2025-04-19 RX ADMIN — Medication 0.3 MILLIGRAM(S): at 13:06

## 2025-04-19 RX ADMIN — NICOTINE POLACRILEX 1 PATCH: 4 GUM, CHEWING ORAL at 08:13

## 2025-04-19 RX ADMIN — LIDOCAINE HYDROCHLORIDE 2 PATCH: 20 JELLY TOPICAL at 02:30

## 2025-04-19 RX ADMIN — IPRATROPIUM BROMIDE AND ALBUTEROL SULFATE 3 MILLILITER(S): .5; 2.5 SOLUTION RESPIRATORY (INHALATION) at 14:25

## 2025-04-19 RX ADMIN — Medication 0.3 MILLIGRAM(S): at 06:45

## 2025-04-20 LAB
CULTURE RESULTS: ABNORMAL
SPECIMEN SOURCE: SIGNIFICANT CHANGE UP

## 2025-04-20 PROCEDURE — 99233 SBSQ HOSP IP/OBS HIGH 50: CPT

## 2025-04-20 RX ORDER — MAGNESIUM CITRATE
296 SOLUTION, ORAL ORAL ONCE
Refills: 0 | Status: COMPLETED | OUTPATIENT
Start: 2025-04-20 | End: 2025-04-20

## 2025-04-20 RX ORDER — POLYETHYLENE GLYCOL 3350 17 G/17G
17 POWDER, FOR SOLUTION ORAL
Refills: 0 | Status: DISCONTINUED | OUTPATIENT
Start: 2025-04-20 | End: 2025-04-22

## 2025-04-20 RX ORDER — ISOSORBIDE MONONITRATE 60 MG/1
60 TABLET, EXTENDED RELEASE ORAL DAILY
Refills: 0 | Status: DISCONTINUED | OUTPATIENT
Start: 2025-04-21 | End: 2025-04-22

## 2025-04-20 RX ORDER — ISOSORBIDE MONONITRATE 60 MG/1
30 TABLET, EXTENDED RELEASE ORAL ONCE
Refills: 0 | Status: COMPLETED | OUTPATIENT
Start: 2025-04-20 | End: 2025-04-20

## 2025-04-20 RX ORDER — BISACODYL 5 MG
5 TABLET, DELAYED RELEASE (ENTERIC COATED) ORAL EVERY 12 HOURS
Refills: 0 | Status: DISCONTINUED | OUTPATIENT
Start: 2025-04-20 | End: 2025-04-20

## 2025-04-20 RX ADMIN — Medication 81 MILLIGRAM(S): at 08:27

## 2025-04-20 RX ADMIN — ISOSORBIDE MONONITRATE 30 MILLIGRAM(S): 60 TABLET, EXTENDED RELEASE ORAL at 08:28

## 2025-04-20 RX ADMIN — Medication 320 MILLIGRAM(S): at 08:29

## 2025-04-20 RX ADMIN — NICOTINE POLACRILEX 1 PATCH: 4 GUM, CHEWING ORAL at 08:27

## 2025-04-20 RX ADMIN — Medication 0.1 MILLIGRAM(S): at 01:06

## 2025-04-20 RX ADMIN — NICOTINE POLACRILEX 1 PATCH: 4 GUM, CHEWING ORAL at 20:47

## 2025-04-20 RX ADMIN — METOPROLOL SUCCINATE 200 MILLIGRAM(S): 50 TABLET, EXTENDED RELEASE ORAL at 08:28

## 2025-04-20 RX ADMIN — METHYLPREDNISOLONE ACETATE 40 MILLIGRAM(S): 80 INJECTION, SUSPENSION INTRA-ARTICULAR; INTRALESIONAL; INTRAMUSCULAR; SOFT TISSUE at 08:31

## 2025-04-20 RX ADMIN — Medication 296 MILLILITER(S): at 20:56

## 2025-04-20 RX ADMIN — IPRATROPIUM BROMIDE AND ALBUTEROL SULFATE 3 MILLILITER(S): .5; 2.5 SOLUTION RESPIRATORY (INHALATION) at 20:58

## 2025-04-20 RX ADMIN — NICOTINE POLACRILEX 1 PATCH: 4 GUM, CHEWING ORAL at 08:30

## 2025-04-20 RX ADMIN — NICOTINE POLACRILEX 1 PATCH: 4 GUM, CHEWING ORAL at 07:27

## 2025-04-20 RX ADMIN — Medication 0.3 MILLIGRAM(S): at 06:27

## 2025-04-20 RX ADMIN — Medication 0.3 MILLIGRAM(S): at 13:16

## 2025-04-20 RX ADMIN — ROSUVASTATIN CALCIUM 10 MILLIGRAM(S): 20 TABLET, FILM COATED ORAL at 20:44

## 2025-04-20 RX ADMIN — LIDOCAINE HYDROCHLORIDE 2 PATCH: 20 JELLY TOPICAL at 20:47

## 2025-04-20 RX ADMIN — LIDOCAINE HYDROCHLORIDE 2 PATCH: 20 JELLY TOPICAL at 08:30

## 2025-04-20 RX ADMIN — MONTELUKAST SODIUM 10 MILLIGRAM(S): 10 TABLET ORAL at 08:28

## 2025-04-20 RX ADMIN — Medication 0.3 MILLIGRAM(S): at 20:44

## 2025-04-20 RX ADMIN — IPRATROPIUM BROMIDE AND ALBUTEROL SULFATE 3 MILLILITER(S): .5; 2.5 SOLUTION RESPIRATORY (INHALATION) at 07:52

## 2025-04-20 RX ADMIN — Medication 360 MILLIGRAM(S): at 08:29

## 2025-04-20 RX ADMIN — IPRATROPIUM BROMIDE AND ALBUTEROL SULFATE 3 MILLILITER(S): .5; 2.5 SOLUTION RESPIRATORY (INHALATION) at 14:16

## 2025-04-20 RX ADMIN — IPRATROPIUM BROMIDE AND ALBUTEROL SULFATE 3 MILLILITER(S): .5; 2.5 SOLUTION RESPIRATORY (INHALATION) at 01:58

## 2025-04-20 RX ADMIN — ISOSORBIDE MONONITRATE 30 MILLIGRAM(S): 60 TABLET, EXTENDED RELEASE ORAL at 10:38

## 2025-04-20 RX ADMIN — POLYETHYLENE GLYCOL 3350 17 GRAM(S): 17 POWDER, FOR SOLUTION ORAL at 10:40

## 2025-04-20 RX ADMIN — LIDOCAINE HYDROCHLORIDE 2 PATCH: 20 JELLY TOPICAL at 20:46

## 2025-04-21 ENCOUNTER — TRANSCRIPTION ENCOUNTER (OUTPATIENT)
Age: 60
End: 2025-04-21

## 2025-04-21 PROCEDURE — 99232 SBSQ HOSP IP/OBS MODERATE 35: CPT

## 2025-04-21 RX ORDER — PREDNISONE 20 MG/1
1 TABLET ORAL
Qty: 4 | Refills: 0
Start: 2025-04-21 | End: 2025-04-24

## 2025-04-21 RX ORDER — METFORMIN HYDROCHLORIDE 850 MG/1
1 TABLET ORAL
Qty: 30 | Refills: 0
Start: 2025-04-21 | End: 2025-05-05

## 2025-04-21 RX ORDER — LEVOFLOXACIN 25 MG/ML
1 SOLUTION ORAL
Qty: 2 | Refills: 0
Start: 2025-04-21 | End: 2025-04-22

## 2025-04-21 RX ORDER — NICOTINE POLACRILEX 4 MG/1
1 GUM, CHEWING ORAL
Qty: 7 | Refills: 0
Start: 2025-04-21 | End: 2025-04-27

## 2025-04-21 RX ORDER — ISOSORBIDE MONONITRATE 60 MG/1
1 TABLET, EXTENDED RELEASE ORAL
Qty: 30 | Refills: 0
Start: 2025-04-21 | End: 2025-05-20

## 2025-04-21 RX ORDER — DEXTROMETHORPHAN HBR, GUAIFENESIN 200 MG/10ML
100 LIQUID ORAL EVERY 6 HOURS
Refills: 0 | Status: DISCONTINUED | OUTPATIENT
Start: 2025-04-21 | End: 2025-04-22

## 2025-04-21 RX ORDER — LACTULOSE 10 G/15ML
20 SOLUTION ORAL ONCE
Refills: 0 | Status: COMPLETED | OUTPATIENT
Start: 2025-04-21 | End: 2025-04-21

## 2025-04-21 RX ADMIN — Medication 0.3 MILLIGRAM(S): at 14:30

## 2025-04-21 RX ADMIN — IPRATROPIUM BROMIDE AND ALBUTEROL SULFATE 3 MILLILITER(S): .5; 2.5 SOLUTION RESPIRATORY (INHALATION) at 08:39

## 2025-04-21 RX ADMIN — NICOTINE POLACRILEX 1 PATCH: 4 GUM, CHEWING ORAL at 08:30

## 2025-04-21 RX ADMIN — Medication 0.3 MILLIGRAM(S): at 06:26

## 2025-04-21 RX ADMIN — METHYLPREDNISOLONE ACETATE 40 MILLIGRAM(S): 80 INJECTION, SUSPENSION INTRA-ARTICULAR; INTRALESIONAL; INTRAMUSCULAR; SOFT TISSUE at 09:29

## 2025-04-21 RX ADMIN — LACTULOSE 20 GRAM(S): 10 SOLUTION ORAL at 14:29

## 2025-04-21 RX ADMIN — LIDOCAINE HYDROCHLORIDE 2 PATCH: 20 JELLY TOPICAL at 19:46

## 2025-04-21 RX ADMIN — IPRATROPIUM BROMIDE AND ALBUTEROL SULFATE 3 MILLILITER(S): .5; 2.5 SOLUTION RESPIRATORY (INHALATION) at 01:38

## 2025-04-21 RX ADMIN — METOPROLOL SUCCINATE 200 MILLIGRAM(S): 50 TABLET, EXTENDED RELEASE ORAL at 09:26

## 2025-04-21 RX ADMIN — Medication 81 MILLIGRAM(S): at 09:27

## 2025-04-21 RX ADMIN — Medication 0.3 MILLIGRAM(S): at 21:42

## 2025-04-21 RX ADMIN — LIDOCAINE HYDROCHLORIDE 2 PATCH: 20 JELLY TOPICAL at 21:45

## 2025-04-21 RX ADMIN — POLYETHYLENE GLYCOL 3350 17 GRAM(S): 17 POWDER, FOR SOLUTION ORAL at 09:25

## 2025-04-21 RX ADMIN — NICOTINE POLACRILEX 1 PATCH: 4 GUM, CHEWING ORAL at 09:28

## 2025-04-21 RX ADMIN — Medication 320 MILLIGRAM(S): at 09:27

## 2025-04-21 RX ADMIN — NICOTINE POLACRILEX 1 PATCH: 4 GUM, CHEWING ORAL at 19:46

## 2025-04-21 RX ADMIN — POLYETHYLENE GLYCOL 3350 17 GRAM(S): 17 POWDER, FOR SOLUTION ORAL at 21:33

## 2025-04-21 RX ADMIN — ISOSORBIDE MONONITRATE 60 MILLIGRAM(S): 60 TABLET, EXTENDED RELEASE ORAL at 09:26

## 2025-04-21 RX ADMIN — IPRATROPIUM BROMIDE AND ALBUTEROL SULFATE 3 MILLILITER(S): .5; 2.5 SOLUTION RESPIRATORY (INHALATION) at 20:38

## 2025-04-21 RX ADMIN — Medication 360 MILLIGRAM(S): at 09:28

## 2025-04-21 RX ADMIN — MONTELUKAST SODIUM 10 MILLIGRAM(S): 10 TABLET ORAL at 09:27

## 2025-04-21 RX ADMIN — ROSUVASTATIN CALCIUM 10 MILLIGRAM(S): 20 TABLET, FILM COATED ORAL at 21:32

## 2025-04-21 RX ADMIN — IPRATROPIUM BROMIDE AND ALBUTEROL SULFATE 3 MILLILITER(S): .5; 2.5 SOLUTION RESPIRATORY (INHALATION) at 13:23

## 2025-04-21 RX ADMIN — LIDOCAINE HYDROCHLORIDE 2 PATCH: 20 JELLY TOPICAL at 09:25

## 2025-04-21 NOTE — DISCHARGE NOTE PROVIDER - NSDCCPCAREPLAN_GEN_ALL_CORE_FT
PRINCIPAL DISCHARGE DIAGNOSIS  Diagnosis: COPD exacerbation  Assessment and Plan of Treatment: please take your medicine, follow up in pmd office, please stop smoking, be compliant with  your mediicine and follow up with doctors      SECONDARY DISCHARGE DIAGNOSES  Diagnosis: Elevated blood sugar  Assessment and Plan of Treatment: please eat less carbohydrate diet, exercise, loose weitgh if possible, follow up in pmd office for ongoing monitoring and management

## 2025-04-21 NOTE — DISCHARGE NOTE PROVIDER - CARE PROVIDER_API CALL
Ruth Scruggs  Family Medicine  [address] [place] [address]  Phone: [phone]  Fax: [phone]  Follow Up Time: 1 week

## 2025-04-21 NOTE — DISCHARGE NOTE PROVIDER - NSDCMRMEDTOKEN_GEN_ALL_CORE_FT
albuterol 90 mcg/inh inhalation aerosol: 2 puff(s) inhaled every 6 hours as needed for Respiratory Distress  ARIPiprazole 10 mg oral tablet: 1 tab(s) orally once a day , last filled at [facility] on 4/14/25 for 30 day supply  aspirin 81 mg oral tablet, chewable: 1 tab(s) orally once a day , last filled 2/20/25 for 30 days for pt at [facility]  budesonide-formoterol: 2 puff(s) inhaled 2 times a day , pt states that she takes and is out of meds, not shown on list faxed from [facility], unable to confirm dose  cloNIDine 0.3 mg oral tablet: 1 tab(s) orally 3 times a day , last filled 2/20/25 for 30 days for pt at [facility]  gabapentin 600 mg oral tablet: 1 tab(s) orally 3 times a day , last filled at [facility] on 4/14/25 for 30 day supply  hydrOXYzine hydrochloride 50 mg oral tablet: 1 tab(s) orally once a day (at bedtime) , last filled at [facility] on 4/14/25 for 30 day supply  isosorbide mononitrate 60 mg oral tablet, extended release: 1 tab(s) orally once a day  levoFLOXacin 500 mg oral tablet: 1 tab(s) orally every 24 hours  metoprolol succinate 200 mg oral tablet, extended release: 1 tab(s) orally once a day , last filled 2/20/25 for 30 days for pt at [facility]  nicotine 7 mg/24 hr transdermal film, extended release: 1 patch transdermal once a day  OXcarbazepine 150 mg oral tablet: 1 tab(s) orally once a day (in the morning) , last filled at [facility] on 4/14/25 for 30 day supply  OXcarbazepine 300 mg oral tablet: 1 tab(s) orally once a day (at bedtime) , last filled at [facility] on 4/14/25 for 30 day supply  predniSONE 20 mg oral tablet: 1 tab(s) orally once a day  rosuvastatin 10 mg oral tablet: 1 tab(s) orally once a day , pt states that she is taking but not shown at [facility] for past 3 months  Singulair 10 mg oral tablet: 1 tab(s) orally once a day , pt states that she is taking but not shown at [facility] for past 3 months  valsartan 320 mg oral tablet: 1 tab(s) orally once a day , last filled 2/20/25 for 30 days for pt at [facility]  verapamil 360 mg/24 hours oral capsule, extended release: 1 cap(s) orally once a day , last filled 2/20/25 for 30 days for pt at [facility]

## 2025-04-21 NOTE — DISCHARGE NOTE PROVIDER - HOSPITAL COURSE
59y old moderately obese Female with significant PMH of HTN, Asthma/COPD, active smoker not on home O2, Chronically elevated troponin due to demand ischemia/hypoxia, non-compliant to medications presented in the ED with c/o worsening shortness of breath and cough for the last 3 days.      #Diagnosed with acute hypoxic respiratory failure due to asthma/copd exacerbation which is due to active smoking, non compliant with meds. She was given oxygen support, steroids, abx, and other supportive measure, counselled not to smoke and be complaint with meds and follow up. She understood me and verbalized back understanding. At this point she is feeling better and is being discharged.     #She has elevated blood sugar due to steroids but probably preDM. I have counselled her to loose weight, exercise, diet modification and further monitoring and management as an outpt. I have prescribed her metformin           Vital Signs Last 24 Hrs  T(C): 36.8 (21 Apr 2025 07:27), Max: 36.9 (20 Apr 2025 20:40)  T(F): 98.3 (21 Apr 2025 07:27), Max: 98.4 (20 Apr 2025 20:40)  HR: 65 (21 Apr 2025 08:40) (61 - 68)  BP: 150/88 (21 Apr 2025 07:27) (149/99 - 163/101)  BP(mean): 101 (21 Apr 2025 07:27) (101 - 117)  RR: 17 (21 Apr 2025 07:27) (17 - 18)  SpO2: 94% (21 Apr 2025 07:27) (94% - 96%)    Parameters below as of 21 Apr 2025 07:27  Patient On (Oxygen Delivery Method): room air        General: WN/WD NAD  Head- Atraumatic, normocephalic   Neurology: A&Ox3, nonfocal, CN II to XII intact, power intact 5/5 in all muscle group  HEENT- PERRLA, moist muscous membrane  Neck-supple, no JVD  Respiratory: Air entry equal b/l, CTA   CVS:  S1S2, no murmurs, rubs or gallops  Abdominal: Soft, NT, ND +BS,   Genitourinary- voiding, non palpable bladder  Extremities: No edema, + peripheral pulses  Skin- no rash, no ulcer  Psychiatric- mood stable   LN- no lymphadenopathy         04-21-25 @ 10:57          Pt has been managed here symptomatically, currently hemodynamically stable and is being discharged with further management as an outpt, time spent 45 minutes

## 2025-04-21 NOTE — DISCHARGE NOTE NURSING/CASE MANAGEMENT/SOCIAL WORK - FINANCIAL ASSISTANCE
[facility] provides services at a reduced cost to those who are determined to be eligible through [facility]’s financial assistance program. Information regarding [facility]’s financial assistance program can be found by going to [email or web address] or by calling [phone].

## 2025-04-21 NOTE — DISCHARGE NOTE NURSING/CASE MANAGEMENT/SOCIAL WORK - PATIENT PORTAL LINK FT
You can access the FollowMyHealth Patient Portal offered by [facility] by registering at the following website: [email or web address]. By joining [facility]’s FollowMyHealth portal, you will also be able to view your health information using other applications (apps) compatible with our system.

## 2025-04-22 VITALS — DIASTOLIC BLOOD PRESSURE: 94 MMHG | SYSTOLIC BLOOD PRESSURE: 151 MMHG

## 2025-04-22 PROCEDURE — 99239 HOSP IP/OBS DSCHRG MGMT >30: CPT

## 2025-04-22 RX ORDER — PREDNISONE 20 MG/1
40 TABLET ORAL ONCE
Refills: 0 | Status: COMPLETED | OUTPATIENT
Start: 2025-04-22 | End: 2025-04-22

## 2025-04-22 RX ADMIN — Medication 81 MILLIGRAM(S): at 10:34

## 2025-04-22 RX ADMIN — NICOTINE POLACRILEX 1 PATCH: 4 GUM, CHEWING ORAL at 09:00

## 2025-04-22 RX ADMIN — METOPROLOL SUCCINATE 200 MILLIGRAM(S): 50 TABLET, EXTENDED RELEASE ORAL at 10:35

## 2025-04-22 RX ADMIN — Medication 0.3 MILLIGRAM(S): at 13:06

## 2025-04-22 RX ADMIN — MONTELUKAST SODIUM 10 MILLIGRAM(S): 10 TABLET ORAL at 10:33

## 2025-04-22 RX ADMIN — IPRATROPIUM BROMIDE AND ALBUTEROL SULFATE 3 MILLILITER(S): .5; 2.5 SOLUTION RESPIRATORY (INHALATION) at 02:54

## 2025-04-22 RX ADMIN — POLYETHYLENE GLYCOL 3350 17 GRAM(S): 17 POWDER, FOR SOLUTION ORAL at 10:36

## 2025-04-22 RX ADMIN — Medication 0.3 MILLIGRAM(S): at 04:55

## 2025-04-22 RX ADMIN — ISOSORBIDE MONONITRATE 60 MILLIGRAM(S): 60 TABLET, EXTENDED RELEASE ORAL at 10:33

## 2025-04-22 RX ADMIN — LIDOCAINE HYDROCHLORIDE 2 PATCH: 20 JELLY TOPICAL at 10:36

## 2025-04-22 RX ADMIN — NICOTINE POLACRILEX 1 PATCH: 4 GUM, CHEWING ORAL at 10:37

## 2025-04-22 RX ADMIN — PREDNISONE 40 MILLIGRAM(S): 20 TABLET ORAL at 11:05

## 2025-04-22 RX ADMIN — NICOTINE POLACRILEX 1 PATCH: 4 GUM, CHEWING ORAL at 07:30

## 2025-04-22 RX ADMIN — Medication 320 MILLIGRAM(S): at 10:35

## 2025-04-22 RX ADMIN — Medication 360 MILLIGRAM(S): at 10:33

## 2025-04-22 RX ADMIN — IPRATROPIUM BROMIDE AND ALBUTEROL SULFATE 3 MILLILITER(S): .5; 2.5 SOLUTION RESPIRATORY (INHALATION) at 08:42

## 2025-04-22 NOTE — PROGRESS NOTE ADULT - SUBJECTIVE AND OBJECTIVE BOX
HPI:  Chief Complaint: shortness of breath.     59y old moderately obese Female with significant PMH of HTN, Asthma/COPD, active smoker not on home O2, Chronically elevated troponin due to demand ischemia/hypoxia, non-compliant to medications presented in the ED with c/o worsening shortness of breath and cough for the last 3 days.  She says that she has a lot of cough with yellowish sputum production. Sometimes, she feels cold and chills.  She continues to smoke 2-3 cig daily, but has cut it down from 1-1.5 packs of cig daily. Patient states she ran out of her home medications 2 days ago, and her PCP did not sent for refill or new prescription.  Otherwise, she denies fever, chest pain, palpitation, N/V, abd pain, diarrhea or dysuria.  She denies alcohol or substance abuse.  Upon arrival in ED , her sats was 85% on RA, and was put on 3 L via NC, and had clearly audible wheezing.  She was given Duoneb and Solumedrol 125 mg IV and Mg 2 gm IV in ED, and now she is feeling a little better, but still has wheezing.         Review of Systems:  CONSTITUTIONAL: as per hpi   EYES/ENT: No visual changes;  No vertigo or throat pain   NECK: No pain or stiffness  RESPIRATORY: As per hpi   CARDIOVASCULAR: No chest pain or palpitations  GASTROINTESTINAL: No abdominal or epigastric pain. No nausea, vomiting, or hematemesis; No diarrhea or constipation.   GENITOURINARY: No dysuria, frequency or hematuria  NEUROLOGICAL: No numbness or weakness  SKIN: No itching, burning, rashes, or lesions   All other review of systems is negative unless indicated above    PHYSICAL EXAM:    Vital Signs Last 24 Hrs  T(C): 36.8 (19 Apr 2025 07:00), Max: 36.8 (18 Apr 2025 22:00)  T(F): 98.2 (19 Apr 2025 07:00), Max: 98.2 (18 Apr 2025 22:00)  HR: 60 (19 Apr 2025 08:35) (56 - 80)  BP: 143/81 (19 Apr 2025 07:00) (143/81 - 187/114)  BP(mean): 116 (18 Apr 2025 16:52) (116 - 118)  RR: 19 (19 Apr 2025 07:00) (16 - 19)  SpO2: 98% (19 Apr 2025 08:35) (92% - 98%)    Parameters below as of 19 Apr 2025 08:35  Patient On (Oxygen Delivery Method): room air        GENERAL: comfortable   HEAD:  Atraumatic, Normocephalic  EYES: EOMI, PERRLA, conjunctiva and sclera clear  HEENT: Moist mucous membranes  NECK: Supple, No JVD  NERVOUS SYSTEM:  Alert & Oriented X3, Motor Strength 5/5 B/L upper and lower extremities; DTRs 2+ intact and symmetric  CHEST/LUNG: AEEB, mild wheeze noted   HEART:S1S2 normal, no murmer  ABDOMEN: Soft, Nontender, Nondistended; Bowel sounds present  GENITOURINARY- Voiding, no palpable bladder  EXTREMITIES:  2+ Peripheral Pulses, No clubbing, cyanosis, or edema  MUSCULOSKELTAL- No muscle tenderness, Muscle tone normal, No joint tenderness, no Joint swelling, Joint range of motion-normal  SKIN-no rash, no lesion  PSYCH- Mood stable  LYMPH Node- No palpable lymph node    LABS:                        13.3   11.43 )-----------( 245      ( 18 Apr 2025 07:39 )             42.0     04-19    136  |  105  |  21  ----------------------------<  272[H]  4.7   |  26  |  1.05    Ca    9.1      19 Apr 2025 07:13  Mg     2.0     04-18    TPro  7.1  /  Alb  3.0[L]  /  TBili  0.2  /  DBili  x   /  AST  7[L]  /  ALT  11[L]  /  AlkPhos  122[H]  04-18      Urinalysis Basic - ( 19 Apr 2025 07:13 )    Color: x / Appearance: x / SG: x / pH: x  Gluc: 272 mg/dL / Ketone: x  / Bili: x / Urobili: x   Blood: x / Protein: x / Nitrite: x   Leuk Esterase: x / RBC: x / WBC x   Sq Epi: x / Non Sq Epi: x / Bacteria: x        CAPILLARY BLOOD GLUCOSE                Standing medicine  acetaminophen     Tablet .. 650 milliGRAM(s) Oral every 6 hours PRN  albuterol    90 MICROgram(s) HFA Inhaler 2 Puff(s) Inhalation every 4 hours PRN  albuterol/ipratropium for Nebulization 3 milliLiter(s) Nebulizer every 6 hours  aluminum hydroxide/magnesium hydroxide/simethicone Suspension 30 milliLiter(s) Oral every 4 hours PRN  aspirin  chewable 81 milliGRAM(s) Oral daily  cloNIDine 0.3 milliGRAM(s) Oral three times a day  enoxaparin Injectable 40 milliGRAM(s) SubCutaneous every 24 hours  isosorbide   mononitrate ER Tablet (IMDUR) 30 milliGRAM(s) Oral daily  lactated ringers. 1000 milliLiter(s) IV Continuous <Continuous>  levoFLOXacin  Tablet 500 milliGRAM(s) Oral every 24 hours  lidocaine   4% Patch 2 Patch Transdermal daily  melatonin 3 milliGRAM(s) Oral at bedtime PRN  methylPREDNISolone sodium succinate Injectable 40 milliGRAM(s) IV Push daily  metoprolol succinate  milliGRAM(s) Oral daily  montelukast 10 milliGRAM(s) Oral daily  nicotine -   7 mG/24Hr(s) Patch 1 Patch Transdermal daily  ondansetron Injectable 4 milliGRAM(s) IV Push every 8 hours PRN  rosuvastatin 10 milliGRAM(s) Oral at bedtime  valsartan 320 milliGRAM(s) Oral daily  verapamil  milliGRAM(s) Oral daily                  #Discussed with pt in detial.        Total time spent 51 minutes   
  HPI:  Chief Complaint: shortness of breath.     59y old moderately obese Female with significant PMH of HTN, Asthma/COPD, active smoker not on home O2, Chronically elevated troponin due to demand ischemia/hypoxia, non-compliant to medications presented in the ED with c/o worsening shortness of breath and cough for the last 3 days.  She says that she has a lot of cough with yellowish sputum production. Sometimes, she feels cold and chills.  She continues to smoke 2-3 cig daily, but has cut it down from 1-1.5 packs of cig daily. Patient states she ran out of her home medications 2 days ago, and her PCP did not sent for refill or new prescription.  Otherwise, she denies fever, chest pain, palpitation, N/V, abd pain, diarrhea or dysuria.  She denies alcohol or substance abuse.  Upon arrival in ED , her sats was 85% on RA, and was put on 3 L via NC, and had clearly audible wheezing.  She was given Duoneb and Solumedrol 125 mg IV and Mg 2 gm IV in ED, and now she is feeling a little better, but still has wheezing.         Review of Systems:  CONSTITUTIONAL: as per hpi   EYES/ENT: No visual changes;  No vertigo or throat pain   NECK: No pain or stiffness  RESPIRATORY: As per hpi   CARDIOVASCULAR: No chest pain or palpitations  GASTROINTESTINAL: No abdominal or epigastric pain. No nausea, vomiting, or hematemesis; No diarrhea or constipation.   GENITOURINARY: No dysuria, frequency or hematuria  NEUROLOGICAL: No numbness or weakness  SKIN: No itching, burning, rashes, or lesions   All other review of systems is negative unless indicated above    PHYSICAL EXAM:  Vital Signs Last 24 Hrs  T(C): 36.9 (22 Apr 2025 08:00), Max: 37.1 (22 Apr 2025 00:03)  T(F): 98.5 (22 Apr 2025 08:00), Max: 98.7 (22 Apr 2025 00:03)  HR: 74 (22 Apr 2025 08:40) (60 - 78)  BP: 158/96 (22 Apr 2025 10:13) (155/93 - 170/97)  BP(mean): 109 (22 Apr 2025 04:18) (109 - 113)  RR: 18 (22 Apr 2025 08:00) (18 - 18)  SpO2: 95% (22 Apr 2025 08:40) (94% - 97%)    Parameters below as of 22 Apr 2025 08:40  Patient On (Oxygen Delivery Method): room air                GENERAL: comfortable   HEAD:  Atraumatic, Normocephalic  EYES: EOMI, PERRLA, conjunctiva and sclera clear  HEENT: Moist mucous membranes  NECK: Supple, No JVD  NERVOUS SYSTEM:  Alert & Oriented X3, Motor Strength 5/5 B/L upper and lower extremities; DTRs 2+ intact and symmetric  CHEST/LUNG: AEEB, mild wheeze noted   HEART:S1S2 normal, no murmer  ABDOMEN: Soft, Nontender, Nondistended; Bowel sounds present  GENITOURINARY- Voiding, no palpable bladder  EXTREMITIES:  2+ Peripheral Pulses, No clubbing, cyanosis, or edema  MUSCULOSKELETAL No muscle tenderness, Muscle tone normal, No joint tenderness, no Joint swelling, Joint range of motion-normal  SKIN-no rash, no lesion  PSYCH- Mood stable  LYMPH Node- No palpable lymph node                        CAPILLARY BLOOD GLUCOSE          MEDICATIONS  (STANDING):  albuterol/ipratropium for Nebulization 3 milliLiter(s) Nebulizer every 6 hours  aspirin  chewable 81 milliGRAM(s) Oral daily  cloNIDine 0.3 milliGRAM(s) Oral three times a day  enoxaparin Injectable 40 milliGRAM(s) SubCutaneous every 24 hours  isosorbide   mononitrate ER Tablet (IMDUR) 60 milliGRAM(s) Oral daily  lactated ringers. 1000 milliLiter(s) (125 mL/Hr) IV Continuous <Continuous>  lidocaine   4% Patch 2 Patch Transdermal daily  metoprolol succinate  milliGRAM(s) Oral daily  montelukast 10 milliGRAM(s) Oral daily  nicotine -   7 mG/24Hr(s) Patch 1 Patch Transdermal daily  polyethylene glycol 3350 17 Gram(s) Oral two times a day  rosuvastatin 10 milliGRAM(s) Oral at bedtime  valsartan 320 milliGRAM(s) Oral daily  verapamil  milliGRAM(s) Oral daily    MEDICATIONS  (PRN):  acetaminophen     Tablet .. 650 milliGRAM(s) Oral every 6 hours PRN Temp greater or equal to 38C (100.4F), Mild Pain (1 - 3)  albuterol    90 MICROgram(s) HFA Inhaler 2 Puff(s) Inhalation every 4 hours PRN Shortness of Breath and/or Wheezing  aluminum hydroxide/magnesium hydroxide/simethicone Suspension 30 milliLiter(s) Oral every 4 hours PRN Dyspepsia  guaiFENesin Oral Liquid (Sugar-Free) 100 milliGRAM(s) Oral every 6 hours PRN Cough  melatonin 3 milliGRAM(s) Oral at bedtime PRN Insomnia  ondansetron Injectable 4 milliGRAM(s) IV Push every 8 hours PRN Nausea and/or Vomiting        #Discussed with pt in detail        Total time spent 51 minutes   
  HPI:  Chief Complaint: shortness of breath.     59y old moderately obese Female with significant PMH of HTN, Asthma/COPD, active smoker not on home O2, Chronically elevated troponin due to demand ischemia/hypoxia, non-compliant to medications presented in the ED with c/o worsening shortness of breath and cough for the last 3 days.  She says that she has a lot of cough with yellowish sputum production. Sometimes, she feels cold and chills.  She continues to smoke 2-3 cig daily, but has cut it down from 1-1.5 packs of cig daily. Patient states she ran out of her home medications 2 days ago, and her PCP did not sent for refill or new prescription.  Otherwise, she denies fever, chest pain, palpitation, N/V, abd pain, diarrhea or dysuria.  She denies alcohol or substance abuse.  Upon arrival in ED , her sats was 85% on RA, and was put on 3 L via NC, and had clearly audible wheezing.  She was given Duoneb and Solumedrol 125 mg IV and Mg 2 gm IV in ED, and now she is feeling a little better, but still has wheezing.         Review of Systems:  CONSTITUTIONAL: as per hpi   EYES/ENT: No visual changes;  No vertigo or throat pain   NECK: No pain or stiffness  RESPIRATORY: As per hpi   CARDIOVASCULAR: No chest pain or palpitations  GASTROINTESTINAL: No abdominal or epigastric pain. No nausea, vomiting, or hematemesis; No diarrhea or constipation.   GENITOURINARY: No dysuria, frequency or hematuria  NEUROLOGICAL: No numbness or weakness  SKIN: No itching, burning, rashes, or lesions   All other review of systems is negative unless indicated above    PHYSICAL EXAM:  Vital Signs Last 24 Hrs  T(C): 36.5 (20 Apr 2025 07:51), Max: 37.2 (19 Apr 2025 20:37)  T(F): 97.7 (20 Apr 2025 07:51), Max: 98.9 (19 Apr 2025 20:37)  HR: 70 (20 Apr 2025 07:51) (60 - 70)  BP: 161/94 (20 Apr 2025 07:51) (124/92 - 204/110)  BP(mean): 90 (19 Apr 2025 15:50) (90 - 90)  RR: 18 (20 Apr 2025 07:51) (18 - 19)  SpO2: 93% (20 Apr 2025 07:51) (92% - 96%)    Parameters below as of 20 Apr 2025 07:51  Patient On (Oxygen Delivery Method): room air            GENERAL: comfortable   HEAD:  Atraumatic, Normocephalic  EYES: EOMI, PERRLA, conjunctiva and sclera clear  HEENT: Moist mucous membranes  NECK: Supple, No JVD  NERVOUS SYSTEM:  Alert & Oriented X3, Motor Strength 5/5 B/L upper and lower extremities; DTRs 2+ intact and symmetric  CHEST/LUNG: AEEB, mild wheeze noted   HEART:S1S2 normal, no murmer  ABDOMEN: Soft, Nontender, Nondistended; Bowel sounds present  GENITOURINARY- Voiding, no palpable bladder  EXTREMITIES:  2+ Peripheral Pulses, No clubbing, cyanosis, or edema  MUSCULOSKELETAL No muscle tenderness, Muscle tone normal, No joint tenderness, no Joint swelling, Joint range of motion-normal  SKIN-no rash, no lesion  PSYCH- Mood stable  LYMPH Node- No palpable lymph node      04-19    136  |  105  |  21  ----------------------------<  272[H]  4.7   |  26  |  1.05    Ca    9.1      19 Apr 2025 07:13          Culture - Sputum (collected 19 Apr 2025 08:15)  Source: Sputum Sputum  Gram Stain (19 Apr 2025 16:20):    Few polymorphonuclear leukocytes per low power field    Moderate Squamous epithelial cells per low power field    Moderate Gram positive cocci in pairs per oil power field  Preliminary Report (20 Apr 2025 07:53):    Commensal georgette consistent with body site        Urinalysis Basic - ( 19 Apr 2025 07:13 )    Color: x / Appearance: x / SG: x / pH: x  Gluc: 272 mg/dL / Ketone: x  / Bili: x / Urobili: x   Blood: x / Protein: x / Nitrite: x   Leuk Esterase: x / RBC: x / WBC x   Sq Epi: x / Non Sq Epi: x / Bacteria: x          CAPILLARY BLOOD GLUCOSE          Culture - Sputum (collected 19 Apr 2025 08:15)  Source: Sputum Sputum  Gram Stain (19 Apr 2025 16:20):    Few polymorphonuclear leukocytes per low power field    Moderate Squamous epithelial cells per low power field    Moderate Gram positive cocci in pairs per oil power field  Preliminary Report (20 Apr 2025 07:53):    Commensal georgette consistent with body site      MEDICATIONS  (STANDING):  albuterol/ipratropium for Nebulization 3 milliLiter(s) Nebulizer every 6 hours  aspirin  chewable 81 milliGRAM(s) Oral daily  cloNIDine 0.3 milliGRAM(s) Oral three times a day  enoxaparin Injectable 40 milliGRAM(s) SubCutaneous every 24 hours  isosorbide   mononitrate ER Tablet (IMDUR) 30 milliGRAM(s) Oral once  lactated ringers. 1000 milliLiter(s) (125 mL/Hr) IV Continuous <Continuous>  levoFLOXacin  Tablet 500 milliGRAM(s) Oral every 24 hours  lidocaine   4% Patch 2 Patch Transdermal daily  methylPREDNISolone sodium succinate Injectable 40 milliGRAM(s) IV Push daily  metoprolol succinate  milliGRAM(s) Oral daily  montelukast 10 milliGRAM(s) Oral daily  nicotine -   7 mG/24Hr(s) Patch 1 Patch Transdermal daily  rosuvastatin 10 milliGRAM(s) Oral at bedtime  valsartan 320 milliGRAM(s) Oral daily  verapamil  milliGRAM(s) Oral daily    MEDICATIONS  (PRN):  acetaminophen     Tablet .. 650 milliGRAM(s) Oral every 6 hours PRN Temp greater or equal to 38C (100.4F), Mild Pain (1 - 3)  albuterol    90 MICROgram(s) HFA Inhaler 2 Puff(s) Inhalation every 4 hours PRN Shortness of Breath and/or Wheezing  aluminum hydroxide/magnesium hydroxide/simethicone Suspension 30 milliLiter(s) Oral every 4 hours PRN Dyspepsia  melatonin 3 milliGRAM(s) Oral at bedtime PRN Insomnia  ondansetron Injectable 4 milliGRAM(s) IV Push every 8 hours PRN Nausea and/or Vomiting          #Discussed with pt in detail        Total time spent 51 minutes

## 2025-04-22 NOTE — PROGRESS NOTE ADULT - ASSESSMENT
A/P    # Acute exacerbation of COPD and Asthma.  -iv steroids, supportive care,   -ct lower dose of steroidal and monitor for rebound in symptoms     # Acute respiratory failure with hypoxia due to the above.  -supportive care     # MAXWELL.  -resolved   -monitor it     # Acute hyperglycemia.  -monitor it on steroids     # HTN   elevated reading again noted today  -monitor it  -ct ISMN, clonidine,metoprolol   -increase ISMN today     #Dyslipidemia.  -Stable and controlled.  -C/w her ASA, Metoprolol, Verapamil, Valsartan, Clonidine and Rosuvastatin.    # Tobacco abuse.  -Smoking cessation counseling.    # DVT prophylaxis: Lovenox sq daily.    
A/P    # Acute exacerbation of COPD and Asthma.  --resolved   -now on po steroids     # Acute respiratory failure with hypoxia due to the above.  -resolved     # MAXWELL.  -resolved   -monitor it     # Acute hyperglycemia.  -monitor it on steroids     # HTN   elevated reading again noted today  -monitor it  -ct ISMN, clonidine,metoprolol   -increase ISMN today     #Dyslipidemia.  -Stable and controlled.  -C/w her ASA, Metoprolol, Verapamil, Valsartan, Clonidine and Rosuvastatin.    # Tobacco abuse.  -Smoking cessation counseling.    #d/c today with same plan as mentioned in d/c summary     
A/P    # Acute exacerbation of COPD and Asthma.  -iv steroids, supportive care,   -cut down on steroids     # Acute respiratory failure with hypoxia due to the above.  -supportive care     # MAXWELL.  -resolved   -monitor it     # Acute hyperglycemia.  -monitor it on steroids     # HTN   -now better reading   -monitor it  -ct ISMN, clonidine,metoprolol     #Dyslipidemia.  -Stable and controlled.  -C/w her ASA, Metoprolol, Verapamil, Valsartan, Clonidine and Rosuvastatin.    # Tobacco abuse.  -Smoking cessation counseling.    # DVT prophylaxis: Lovenox sq daily.

## 2025-04-25 DIAGNOSIS — N17.9 ACUTE KIDNEY FAILURE, UNSPECIFIED: ICD-10-CM

## 2025-04-25 DIAGNOSIS — I24.89 OTHER FORMS OF ACUTE ISCHEMIC HEART DISEASE: ICD-10-CM

## 2025-04-25 DIAGNOSIS — I10 ESSENTIAL (PRIMARY) HYPERTENSION: ICD-10-CM

## 2025-04-25 DIAGNOSIS — F17.210 NICOTINE DEPENDENCE, CIGARETTES, UNCOMPLICATED: ICD-10-CM

## 2025-04-25 DIAGNOSIS — J45.909 UNSPECIFIED ASTHMA, UNCOMPLICATED: ICD-10-CM

## 2025-04-25 DIAGNOSIS — R73.03 PREDIABETES: ICD-10-CM

## 2025-04-25 DIAGNOSIS — J44.1 CHRONIC OBSTRUCTIVE PULMONARY DISEASE WITH (ACUTE) EXACERBATION: ICD-10-CM

## 2025-05-04 ENCOUNTER — INPATIENT (INPATIENT)
Facility: HOSPITAL | Age: 60
LOS: 4 days | Discharge: HOME | DRG: 192 | End: 2025-05-09
Attending: STUDENT IN AN ORGANIZED HEALTH CARE EDUCATION/TRAINING PROGRAM | Admitting: STUDENT IN AN ORGANIZED HEALTH CARE EDUCATION/TRAINING PROGRAM
Payer: MEDICAID

## 2025-05-04 VITALS
OXYGEN SATURATION: 94 % | SYSTOLIC BLOOD PRESSURE: 166 MMHG | DIASTOLIC BLOOD PRESSURE: 104 MMHG | RESPIRATION RATE: 20 BRPM | TEMPERATURE: 98 F | HEIGHT: 63 IN | WEIGHT: 231.93 LBS | HEART RATE: 66 BPM

## 2025-05-04 PROBLEM — J44.9 CHRONIC OBSTRUCTIVE PULMONARY DISEASE, UNSPECIFIED: Chronic | Status: ACTIVE | Noted: 2024-10-29

## 2025-05-04 PROBLEM — I10 ESSENTIAL (PRIMARY) HYPERTENSION: Chronic | Status: ACTIVE | Noted: 2024-10-29

## 2025-05-04 LAB
ALBUMIN SERPL ELPH-MCNC: 3.4 G/DL — SIGNIFICANT CHANGE UP (ref 3.3–5.2)
ALP SERPL-CCNC: 91 U/L — SIGNIFICANT CHANGE UP (ref 40–120)
ALT FLD-CCNC: 18 U/L — SIGNIFICANT CHANGE UP
ANION GAP SERPL CALC-SCNC: 12 MMOL/L — SIGNIFICANT CHANGE UP (ref 5–17)
APTT BLD: 33.8 SEC — SIGNIFICANT CHANGE UP (ref 26.1–36.8)
AST SERPL-CCNC: 20 U/L — SIGNIFICANT CHANGE UP
BASOPHILS # BLD AUTO: 0.04 K/UL — SIGNIFICANT CHANGE UP (ref 0–0.2)
BASOPHILS NFR BLD AUTO: 0.3 % — SIGNIFICANT CHANGE UP (ref 0–2)
BILIRUB SERPL-MCNC: <0.2 MG/DL — LOW (ref 0.4–2)
BUN SERPL-MCNC: 18.5 MG/DL — SIGNIFICANT CHANGE UP (ref 8–20)
CALCIUM SERPL-MCNC: 8.2 MG/DL — LOW (ref 8.4–10.5)
CHLORIDE SERPL-SCNC: 102 MMOL/L — SIGNIFICANT CHANGE UP (ref 96–108)
CO2 SERPL-SCNC: 27 MMOL/L — SIGNIFICANT CHANGE UP (ref 22–29)
CREAT SERPL-MCNC: 0.93 MG/DL — SIGNIFICANT CHANGE UP (ref 0.5–1.3)
EGFR: 70 ML/MIN/1.73M2 — SIGNIFICANT CHANGE UP
EGFR: 70 ML/MIN/1.73M2 — SIGNIFICANT CHANGE UP
EOSINOPHIL # BLD AUTO: 0.12 K/UL — SIGNIFICANT CHANGE UP (ref 0–0.5)
EOSINOPHIL NFR BLD AUTO: 0.9 % — SIGNIFICANT CHANGE UP (ref 0–6)
GLUCOSE SERPL-MCNC: 283 MG/DL — HIGH (ref 70–99)
HCT VFR BLD CALC: 32.2 % — LOW (ref 34.5–45)
HGB BLD-MCNC: 10 G/DL — LOW (ref 11.5–15.5)
IMM GRANULOCYTES # BLD AUTO: 0.09 K/UL — HIGH (ref 0–0.07)
IMM GRANULOCYTES NFR BLD AUTO: 0.7 % — SIGNIFICANT CHANGE UP (ref 0–0.9)
INR BLD: 0.96 RATIO — SIGNIFICANT CHANGE UP (ref 0.85–1.16)
LYMPHOCYTES # BLD AUTO: 2.25 K/UL — SIGNIFICANT CHANGE UP (ref 1–3.3)
LYMPHOCYTES NFR BLD AUTO: 17.7 % — SIGNIFICANT CHANGE UP (ref 13–44)
MCHC RBC-ENTMCNC: 28.7 PG — SIGNIFICANT CHANGE UP (ref 27–34)
MCHC RBC-ENTMCNC: 31.1 G/DL — LOW (ref 32–36)
MCV RBC AUTO: 92.5 FL — SIGNIFICANT CHANGE UP (ref 80–100)
MONOCYTES # BLD AUTO: 1.05 K/UL — HIGH (ref 0–0.9)
MONOCYTES NFR BLD AUTO: 8.3 % — SIGNIFICANT CHANGE UP (ref 2–14)
NEUTROPHILS # BLD AUTO: 9.14 K/UL — HIGH (ref 1.8–7.4)
NEUTROPHILS NFR BLD AUTO: 72.1 % — SIGNIFICANT CHANGE UP (ref 43–77)
NRBC # BLD AUTO: 0 K/UL — SIGNIFICANT CHANGE UP (ref 0–0)
NRBC # FLD: 0 K/UL — SIGNIFICANT CHANGE UP (ref 0–0)
NRBC BLD AUTO-RTO: 0 /100 WBCS — SIGNIFICANT CHANGE UP (ref 0–0)
NT-PROBNP SERPL-SCNC: 533 PG/ML — HIGH (ref 0–300)
PLATELET # BLD AUTO: 245 K/UL — SIGNIFICANT CHANGE UP (ref 150–400)
PMV BLD: 11.2 FL — SIGNIFICANT CHANGE UP (ref 7–13)
POTASSIUM SERPL-MCNC: 4.1 MMOL/L — SIGNIFICANT CHANGE UP (ref 3.5–5.3)
POTASSIUM SERPL-SCNC: 4.1 MMOL/L — SIGNIFICANT CHANGE UP (ref 3.5–5.3)
PROT SERPL-MCNC: 6 G/DL — LOW (ref 6.6–8.7)
PROTHROM AB SERPL-ACNC: 10.9 SEC — SIGNIFICANT CHANGE UP (ref 9.9–13.4)
RBC # BLD: 3.48 M/UL — LOW (ref 3.8–5.2)
RBC # FLD: 14.8 % — HIGH (ref 10.3–14.5)
SODIUM SERPL-SCNC: 141 MMOL/L — SIGNIFICANT CHANGE UP (ref 135–145)
TROPONIN T, HIGH SENSITIVITY RESULT: 20 NG/L — SIGNIFICANT CHANGE UP (ref 0–51)
WBC # BLD: 12.69 K/UL — HIGH (ref 3.8–10.5)
WBC # FLD AUTO: 12.69 K/UL — HIGH (ref 3.8–10.5)

## 2025-05-04 PROCEDURE — 71045 X-RAY EXAM CHEST 1 VIEW: CPT | Mod: 26

## 2025-05-04 PROCEDURE — 99285 EMERGENCY DEPT VISIT HI MDM: CPT

## 2025-05-04 PROCEDURE — 93010 ELECTROCARDIOGRAM REPORT: CPT

## 2025-05-04 RX ORDER — IPRATROPIUM BROMIDE AND ALBUTEROL SULFATE .5; 2.5 MG/3ML; MG/3ML
3 SOLUTION RESPIRATORY (INHALATION) ONCE
Refills: 0 | Status: COMPLETED | OUTPATIENT
Start: 2025-05-04 | End: 2025-05-04

## 2025-05-04 RX ORDER — METHYLPREDNISOLONE ACETATE 80 MG/ML
125 INJECTION, SUSPENSION INTRA-ARTICULAR; INTRALESIONAL; INTRAMUSCULAR; SOFT TISSUE ONCE
Refills: 0 | Status: COMPLETED | OUTPATIENT
Start: 2025-05-04 | End: 2025-05-04

## 2025-05-04 RX ADMIN — IPRATROPIUM BROMIDE AND ALBUTEROL SULFATE 3 MILLILITER(S): .5; 2.5 SOLUTION RESPIRATORY (INHALATION) at 21:57

## 2025-05-04 RX ADMIN — METHYLPREDNISOLONE ACETATE 125 MILLIGRAM(S): 80 INJECTION, SUSPENSION INTRA-ARTICULAR; INTRALESIONAL; INTRAMUSCULAR; SOFT TISSUE at 21:57

## 2025-05-04 RX ADMIN — Medication 80 MILLIGRAM(S): at 23:08

## 2025-05-04 NOTE — ED PROVIDER NOTE - OBJECTIVE STATEMENT
Pt is a 60 yo female with pmh of htn, asthma/copd, current smoker presenting with SOB. pt reports of SOB for the past few days. Pt denies chest pain. Pt has not taken her bp meds for weeks due to not being able to follow up with her pcp appts. Pt reports of subjective fever. pt denies abdominal pain, N/V/D, urinary symptoms.

## 2025-05-04 NOTE — ED ADULT NURSE NOTE - NSFALLUNIVINTERV_ED_ALL_ED
Bed/Stretcher in lowest position, wheels locked, appropriate side rails in place/Call bell, personal items and telephone in reach/Instruct patient to call for assistance before getting out of bed/chair/stretcher/Non-slip footwear applied when patient is off stretcher/[place] to call system/Physically safe environment - no spills, clutter or unnecessary equipment/Purposeful proactive rounding/Room/bathroom lighting operational, light cord in reach

## 2025-05-04 NOTE — ED PROVIDER NOTE - PHYSICAL EXAMINATION
General: NAD, well appearing  HEENT: Normocephalic, atraumatic  Neck: No apparent stiffness or JVD  Pulm: Chest wall symmetric and nontender, lungs mild wheezing bl  Abdomen: Nontender and nondistended  Skin: Skin is warm, dry and intact without rashes or lesions.  Neuro: No motor or sensory deficits above reported baseline  MSK: pitting edema BLE 1+

## 2025-05-04 NOTE — ED PROVIDER NOTE - CLINICAL SUMMARY MEDICAL DECISION MAKING FREE TEXT BOX
Pt is a 60 yo female with pmh of htn, asthma/copd, current smoker presenting with SOB. pt reports of SOB for the past few days. Pt denies chest pain. Pt has not taken her bp meds for weeks due to not being able to follow up with her pcp appts. Pt reports of subjective fever. pt denies abdominal pain, N/V/D, urinary symptoms.     vs shows hypertension, otherwise stable, satting in mid 90s on RA. exam shows wheezing on exam and pitting edema 1+.    acs workup, pt has not had good follow up or bp control. solumedrol and duoneb for wheezing hx of asthma. Pt is a 60 yo female with pmh of htn, asthma/copd, current smoker presenting with SOB. pt reports of SOB for the past few days. Pt denies chest pain. Pt has not taken her bp meds for weeks due to not being able to follow up with her pcp appts. Pt reports of subjective fever. pt denies abdominal pain, N/V/D, urinary symptoms.     vs shows hypertension, otherwise stable, satting in mid 90s on RA. exam shows wheezing on exam and pitting edema 1+.    acs workup, pt has not had good follow up or bp control. solumedrol and duoneb for wheezing hx of asthma.    refilled bp meds to pharmacy, med reconciliation done with pt. Pt is a 58 yo female with pmh of htn, asthma/copd, current smoker presenting with SOB. pt reports of SOB for the past few days. Pt denies chest pain. Pt has not taken her bp meds for weeks due to not being able to follow up with her pcp appts. Pt reports of subjective fever. pt denies abdominal pain, N/V/D, urinary symptoms.     vs shows hypertension, otherwise stable, satting in mid 90s on RA. exam shows wheezing on exam and pitting edema 1+.    acs workup, pt has not had good follow up or bp control. solumedrol and duoneb for wheezing hx of asthma.    refilled bp meds to pharmacy, med reconciliation done with pt.    pt given home bp meds, and hydralazine iv for further control. given duoneb for SOB, pt was satting in 87% on ra, started on 2LNC with sats back up to high 90s. pt was walked with nurse, became tachypneic and had to go back to bed. pt positive for covid. admit to tele for copd exacerbation with covid.

## 2025-05-04 NOTE — ED PROVIDER NOTE - CARE PLAN
Called and spoke to patient. Did inform her of provider's recommendations below. Patient understood and had no other questions at this time. She will like a call back with her lab value. Did inform her that this will be done tomorrow as the lab results are in [place] and writer is in [place]. She understood and will wait for a return call tomorrow.      1 Principal Discharge DX:	COPD exacerbation

## 2025-05-04 NOTE — ED PROVIDER NOTE - ATTENDING CONTRIBUTION TO CARE
hx of COPD and asthma, worsening Cough and SOB; non toxic, speaking in full sentences; diffuse wheezing in all lung fields; no pedal edema, no JVD; labs and imaging reviewed; still with wheezing and requiring O2; agree with resident plan of care    I personally saw the patient with the resident, and completed the key components of the history and physical exam. I then discussed the management plan with the resident.

## 2025-05-04 NOTE — ED ADULT NURSE NOTE - OBJECTIVE STATEMENT
Patient presents to ED,  BIBA for SOB x2 days Patient states she has cant find her rescue inhaler that is prescribed to her for asthma and COPD. Patient states she is still a current cigarette smoker. NSR lead II, SPO2 98% on RA. Chest rise is equal and adequate. Lung sounds mild bilat wheezing. No acute distress noted. Awaiting additional orders,

## 2025-05-04 NOTE — ED PROVIDER NOTE - PROGRESS NOTE DETAILS
pt given home bp meds, and hydralazine iv for further control. given duoneb for SOB, pt was satting in 87% on ra, started on 2LNC with sats back up to high 90s. pt was walked with nurse, became tachypneic and had to go back to bed. admit to tele.

## 2025-05-05 DIAGNOSIS — J44.1 CHRONIC OBSTRUCTIVE PULMONARY DISEASE WITH (ACUTE) EXACERBATION: ICD-10-CM

## 2025-05-05 DIAGNOSIS — D64.9 ANEMIA, UNSPECIFIED: ICD-10-CM

## 2025-05-05 DIAGNOSIS — E11.9 TYPE 2 DIABETES MELLITUS WITHOUT COMPLICATIONS: ICD-10-CM

## 2025-05-05 DIAGNOSIS — M25.512 PAIN IN LEFT SHOULDER: ICD-10-CM

## 2025-05-05 DIAGNOSIS — I10 ESSENTIAL (PRIMARY) HYPERTENSION: ICD-10-CM

## 2025-05-05 LAB
A1C WITH ESTIMATED AVERAGE GLUCOSE RESULT: 8.2 % — HIGH (ref 4–5.6)
ALBUMIN SERPL ELPH-MCNC: 3.6 G/DL — SIGNIFICANT CHANGE UP (ref 3.3–5.2)
ALP SERPL-CCNC: 104 U/L — SIGNIFICANT CHANGE UP (ref 40–120)
ALT FLD-CCNC: 36 U/L — HIGH
ANION GAP SERPL CALC-SCNC: 15 MMOL/L — SIGNIFICANT CHANGE UP (ref 5–17)
AST SERPL-CCNC: 41 U/L — HIGH
BILIRUB SERPL-MCNC: 0.3 MG/DL — LOW (ref 0.4–2)
BUN SERPL-MCNC: 17.7 MG/DL — SIGNIFICANT CHANGE UP (ref 8–20)
CALCIUM SERPL-MCNC: 8.8 MG/DL — SIGNIFICANT CHANGE UP (ref 8.4–10.5)
CHLORIDE SERPL-SCNC: 98 MMOL/L — SIGNIFICANT CHANGE UP (ref 96–108)
CHOLEST SERPL-MCNC: 128 MG/DL — SIGNIFICANT CHANGE UP
CO2 SERPL-SCNC: 22 MMOL/L — SIGNIFICANT CHANGE UP (ref 22–29)
CREAT SERPL-MCNC: 0.82 MG/DL — SIGNIFICANT CHANGE UP (ref 0.5–1.3)
EGFR: 81 ML/MIN/1.73M2 — SIGNIFICANT CHANGE UP
EGFR: 81 ML/MIN/1.73M2 — SIGNIFICANT CHANGE UP
ESTIMATED AVERAGE GLUCOSE: 189 MG/DL — HIGH (ref 68–114)
FLUAV AG NPH QL: SIGNIFICANT CHANGE UP
FLUBV AG NPH QL: SIGNIFICANT CHANGE UP
GLUCOSE BLDC GLUCOMTR-MCNC: 252 MG/DL — HIGH (ref 70–99)
GLUCOSE BLDC GLUCOMTR-MCNC: 324 MG/DL — HIGH (ref 70–99)
GLUCOSE BLDC GLUCOMTR-MCNC: 414 MG/DL — HIGH (ref 70–99)
GLUCOSE BLDC GLUCOMTR-MCNC: 503 MG/DL — CRITICAL HIGH (ref 70–99)
GLUCOSE SERPL-MCNC: 354 MG/DL — HIGH (ref 70–99)
HCT VFR BLD CALC: 35.9 % — SIGNIFICANT CHANGE UP (ref 34.5–45)
HDLC SERPL-MCNC: 57 MG/DL — SIGNIFICANT CHANGE UP
HGB BLD-MCNC: 11.4 G/DL — LOW (ref 11.5–15.5)
LDLC SERPL-MCNC: 59 MG/DL — SIGNIFICANT CHANGE UP
LIPID PNL WITH DIRECT LDL SERPL: 59 MG/DL — SIGNIFICANT CHANGE UP
MCHC RBC-ENTMCNC: 28.8 PG — SIGNIFICANT CHANGE UP (ref 27–34)
MCHC RBC-ENTMCNC: 31.8 G/DL — LOW (ref 32–36)
MCV RBC AUTO: 90.7 FL — SIGNIFICANT CHANGE UP (ref 80–100)
NONHDLC SERPL-MCNC: 71 MG/DL — SIGNIFICANT CHANGE UP
NRBC # BLD AUTO: 0 K/UL — SIGNIFICANT CHANGE UP (ref 0–0)
NRBC # FLD: 0 K/UL — SIGNIFICANT CHANGE UP (ref 0–0)
NRBC BLD AUTO-RTO: 0 /100 WBCS — SIGNIFICANT CHANGE UP (ref 0–0)
PLATELET # BLD AUTO: 270 K/UL — SIGNIFICANT CHANGE UP (ref 150–400)
PMV BLD: 10.6 FL — SIGNIFICANT CHANGE UP (ref 7–13)
POTASSIUM SERPL-MCNC: 4.8 MMOL/L — SIGNIFICANT CHANGE UP (ref 3.5–5.3)
POTASSIUM SERPL-SCNC: 4.8 MMOL/L — SIGNIFICANT CHANGE UP (ref 3.5–5.3)
PROT SERPL-MCNC: 7 G/DL — SIGNIFICANT CHANGE UP (ref 6.6–8.7)
RBC # BLD: 3.96 M/UL — SIGNIFICANT CHANGE UP (ref 3.8–5.2)
RBC # FLD: 14.4 % — SIGNIFICANT CHANGE UP (ref 10.3–14.5)
RSV RNA NPH QL NAA+NON-PROBE: SIGNIFICANT CHANGE UP
SARS-COV-2 RNA SPEC QL NAA+PROBE: DETECTED
SODIUM SERPL-SCNC: 135 MMOL/L — SIGNIFICANT CHANGE UP (ref 135–145)
TRIGL SERPL-MCNC: 52 MG/DL — SIGNIFICANT CHANGE UP
TROPONIN T, HIGH SENSITIVITY RESULT: 18 NG/L — SIGNIFICANT CHANGE UP (ref 0–51)
WBC # BLD: 13.81 K/UL — HIGH (ref 3.8–10.5)
WBC # FLD AUTO: 13.81 K/UL — HIGH (ref 3.8–10.5)

## 2025-05-05 PROCEDURE — 99223 1ST HOSP IP/OBS HIGH 75: CPT

## 2025-05-05 PROCEDURE — 73030 X-RAY EXAM OF SHOULDER: CPT | Mod: 26,LT

## 2025-05-05 RX ORDER — METOPROLOL SUCCINATE 50 MG/1
100 TABLET, EXTENDED RELEASE ORAL DAILY
Refills: 0 | Status: DISCONTINUED | OUTPATIENT
Start: 2025-05-06 | End: 2025-05-08

## 2025-05-05 RX ORDER — ISOSORBIDE MONONITRATE 60 MG/1
60 TABLET, EXTENDED RELEASE ORAL DAILY
Refills: 0 | Status: DISCONTINUED | OUTPATIENT
Start: 2025-05-05 | End: 2025-05-09

## 2025-05-05 RX ORDER — ALBUTEROL SULFATE 2.5 MG/3ML
2 VIAL, NEBULIZER (ML) INHALATION
Qty: 1 | Refills: 0
Start: 2025-05-05

## 2025-05-05 RX ORDER — ONDANSETRON HCL/PF 4 MG/2 ML
4 VIAL (ML) INJECTION EVERY 8 HOURS
Refills: 0 | Status: DISCONTINUED | OUTPATIENT
Start: 2025-05-05 | End: 2025-05-09

## 2025-05-05 RX ORDER — ROSUVASTATIN CALCIUM 20 MG/1
20 TABLET, FILM COATED ORAL AT BEDTIME
Refills: 0 | Status: DISCONTINUED | OUTPATIENT
Start: 2025-05-05 | End: 2025-05-09

## 2025-05-05 RX ORDER — ENOXAPARIN SODIUM 100 MG/ML
40 INJECTION SUBCUTANEOUS EVERY 12 HOURS
Refills: 0 | Status: DISCONTINUED | OUTPATIENT
Start: 2025-05-05 | End: 2025-05-09

## 2025-05-05 RX ORDER — SODIUM CHLORIDE 9 G/1000ML
1000 INJECTION, SOLUTION INTRAVENOUS
Refills: 0 | Status: DISCONTINUED | OUTPATIENT
Start: 2025-05-05 | End: 2025-05-09

## 2025-05-05 RX ORDER — INSULIN LISPRO 100 U/ML
INJECTION, SOLUTION INTRAVENOUS; SUBCUTANEOUS
Refills: 0 | Status: DISCONTINUED | OUTPATIENT
Start: 2025-05-05 | End: 2025-05-09

## 2025-05-05 RX ORDER — DEXTROSE 50 % IN WATER 50 %
12.5 SYRINGE (ML) INTRAVENOUS ONCE
Refills: 0 | Status: DISCONTINUED | OUTPATIENT
Start: 2025-05-05 | End: 2025-05-09

## 2025-05-05 RX ORDER — ACETAMINOPHEN 500 MG/5ML
650 LIQUID (ML) ORAL EVERY 6 HOURS
Refills: 0 | Status: DISCONTINUED | OUTPATIENT
Start: 2025-05-05 | End: 2025-05-09

## 2025-05-05 RX ORDER — INSULIN GLARGINE-YFGN 100 [IU]/ML
10 INJECTION, SOLUTION SUBCUTANEOUS AT BEDTIME
Refills: 0 | Status: DISCONTINUED | OUTPATIENT
Start: 2025-05-05 | End: 2025-05-09

## 2025-05-05 RX ORDER — AZITHROMYCIN 250 MG
500 CAPSULE ORAL EVERY 24 HOURS
Refills: 0 | Status: DISCONTINUED | OUTPATIENT
Start: 2025-05-05 | End: 2025-05-09

## 2025-05-05 RX ORDER — MELATONIN 5 MG
3 TABLET ORAL AT BEDTIME
Refills: 0 | Status: DISCONTINUED | OUTPATIENT
Start: 2025-05-05 | End: 2025-05-09

## 2025-05-05 RX ORDER — INSULIN LISPRO 100 U/ML
INJECTION, SOLUTION INTRAVENOUS; SUBCUTANEOUS AT BEDTIME
Refills: 0 | Status: DISCONTINUED | OUTPATIENT
Start: 2025-05-05 | End: 2025-05-09

## 2025-05-05 RX ORDER — PREDNISONE 20 MG/1
2 TABLET ORAL
Qty: 10 | Refills: 0
Start: 2025-05-05 | End: 2025-05-09

## 2025-05-05 RX ORDER — ROSUVASTATIN CALCIUM 20 MG/1
1 TABLET, FILM COATED ORAL
Qty: 30 | Refills: 0
Start: 2025-05-05 | End: 2025-06-03

## 2025-05-05 RX ORDER — ALPRAZOLAM 0.5 MG
0.5 TABLET, EXTENDED RELEASE 24 HR ORAL ONCE
Refills: 0 | Status: DISCONTINUED | OUTPATIENT
Start: 2025-05-05 | End: 2025-05-05

## 2025-05-05 RX ORDER — IPRATROPIUM BROMIDE AND ALBUTEROL SULFATE .5; 2.5 MG/3ML; MG/3ML
3 SOLUTION RESPIRATORY (INHALATION) ONCE
Refills: 0 | Status: COMPLETED | OUTPATIENT
Start: 2025-05-05 | End: 2025-05-05

## 2025-05-05 RX ORDER — GLUCAGON 3 MG/1
1 POWDER NASAL ONCE
Refills: 0 | Status: DISCONTINUED | OUTPATIENT
Start: 2025-05-05 | End: 2025-05-09

## 2025-05-05 RX ORDER — MAGNESIUM, ALUMINUM HYDROXIDE 200-200 MG
30 TABLET,CHEWABLE ORAL EVERY 4 HOURS
Refills: 0 | Status: DISCONTINUED | OUTPATIENT
Start: 2025-05-05 | End: 2025-05-09

## 2025-05-05 RX ORDER — METOPROLOL SUCCINATE 50 MG/1
1 TABLET, EXTENDED RELEASE ORAL
Qty: 30 | Refills: 0
Start: 2025-05-05 | End: 2025-06-03

## 2025-05-05 RX ORDER — ALBUTEROL SULFATE 2.5 MG/3ML
2 VIAL, NEBULIZER (ML) INHALATION EVERY 6 HOURS
Refills: 0 | Status: DISCONTINUED | OUTPATIENT
Start: 2025-05-05 | End: 2025-05-09

## 2025-05-05 RX ORDER — DEXTROSE 50 % IN WATER 50 %
25 SYRINGE (ML) INTRAVENOUS ONCE
Refills: 0 | Status: DISCONTINUED | OUTPATIENT
Start: 2025-05-05 | End: 2025-05-09

## 2025-05-05 RX ORDER — INSULIN LISPRO 100 U/ML
12 INJECTION, SOLUTION INTRAVENOUS; SUBCUTANEOUS ONCE
Refills: 0 | Status: COMPLETED | OUTPATIENT
Start: 2025-05-05 | End: 2025-05-05

## 2025-05-05 RX ORDER — DEXTROSE 50 % IN WATER 50 %
15 SYRINGE (ML) INTRAVENOUS ONCE
Refills: 0 | Status: DISCONTINUED | OUTPATIENT
Start: 2025-05-05 | End: 2025-05-09

## 2025-05-05 RX ORDER — DEXAMETHASONE 0.5 MG/1
6 TABLET ORAL DAILY
Refills: 0 | Status: DISCONTINUED | OUTPATIENT
Start: 2025-05-05 | End: 2025-05-05

## 2025-05-05 RX ORDER — IPRATROPIUM BROMIDE AND ALBUTEROL SULFATE .5; 2.5 MG/3ML; MG/3ML
3 SOLUTION RESPIRATORY (INHALATION) EVERY 6 HOURS
Refills: 0 | Status: DISCONTINUED | OUTPATIENT
Start: 2025-05-05 | End: 2025-05-09

## 2025-05-05 RX ORDER — METOPROLOL SUCCINATE 50 MG/1
200 TABLET, EXTENDED RELEASE ORAL DAILY
Refills: 0 | Status: DISCONTINUED | OUTPATIENT
Start: 2025-05-05 | End: 2025-05-05

## 2025-05-05 RX ORDER — METHYLPREDNISOLONE ACETATE 80 MG/ML
40 INJECTION, SUSPENSION INTRA-ARTICULAR; INTRALESIONAL; INTRAMUSCULAR; SOFT TISSUE DAILY
Refills: 0 | Status: DISCONTINUED | OUTPATIENT
Start: 2025-05-06 | End: 2025-05-06

## 2025-05-05 RX ADMIN — Medication 10 MILLIGRAM(S): at 08:42

## 2025-05-05 RX ADMIN — DEXAMETHASONE 6 MILLIGRAM(S): 0.5 TABLET ORAL at 06:14

## 2025-05-05 RX ADMIN — Medication 120 MILLIGRAM(S): at 21:41

## 2025-05-05 RX ADMIN — INSULIN LISPRO 12: 100 INJECTION, SOLUTION INTRAVENOUS; SUBCUTANEOUS at 12:31

## 2025-05-05 RX ADMIN — Medication 10 MILLIGRAM(S): at 04:21

## 2025-05-05 RX ADMIN — ISOSORBIDE MONONITRATE 60 MILLIGRAM(S): 60 TABLET, EXTENDED RELEASE ORAL at 12:31

## 2025-05-05 RX ADMIN — INSULIN LISPRO 2: 100 INJECTION, SOLUTION INTRAVENOUS; SUBCUTANEOUS at 21:40

## 2025-05-05 RX ADMIN — INSULIN GLARGINE-YFGN 10 UNIT(S): 100 INJECTION, SOLUTION SUBCUTANEOUS at 21:41

## 2025-05-05 RX ADMIN — INSULIN LISPRO 12 UNIT(S): 100 INJECTION, SOLUTION INTRAVENOUS; SUBCUTANEOUS at 08:41

## 2025-05-05 RX ADMIN — ROSUVASTATIN CALCIUM 20 MILLIGRAM(S): 20 TABLET, FILM COATED ORAL at 21:41

## 2025-05-05 RX ADMIN — INSULIN LISPRO 8: 100 INJECTION, SOLUTION INTRAVENOUS; SUBCUTANEOUS at 17:35

## 2025-05-05 RX ADMIN — Medication 2 PUFF(S): at 17:38

## 2025-05-05 RX ADMIN — IPRATROPIUM BROMIDE AND ALBUTEROL SULFATE 3 MILLILITER(S): .5; 2.5 SOLUTION RESPIRATORY (INHALATION) at 04:22

## 2025-05-05 RX ADMIN — Medication 0.2 MILLIGRAM(S): at 01:00

## 2025-05-05 RX ADMIN — Medication 250 MILLIGRAM(S): at 06:15

## 2025-05-05 RX ADMIN — METOPROLOL SUCCINATE 200 MILLIGRAM(S): 50 TABLET, EXTENDED RELEASE ORAL at 06:14

## 2025-05-05 RX ADMIN — Medication 0.3 MILLIGRAM(S): at 21:41

## 2025-05-05 RX ADMIN — Medication 0.3 MILLIGRAM(S): at 16:07

## 2025-05-05 RX ADMIN — Medication 0.5 MILLIGRAM(S): at 01:00

## 2025-05-05 RX ADMIN — Medication 120 MILLIGRAM(S): at 16:01

## 2025-05-05 RX ADMIN — Medication 0.3 MILLIGRAM(S): at 06:14

## 2025-05-05 NOTE — H&P ADULT - ASSESSMENT
59 year old female with a PMH of HTN, DM, Asthma/COPD, Current smoker (4-5 cig/day) presents to the ED for SOB. Patient [place] [facility] for COPD exacerbation. Endorses since being discharges has been having SOB progressively getting worse. Patient currently resides in a shelter, has not been taking her medications.

## 2025-05-05 NOTE — PATIENT PROFILE ADULT - FALL HARM RISK - HARM RISK INTERVENTIONS

## 2025-05-05 NOTE — H&P ADULT - PROBLEM SELECTOR PLAN 2
Noncompliant with medication   Hypertensive upon ED arrival  --> Received Hydralazine 10mg KAREN & Valsartan 80mg   Continue home meds   - Clonidine 0.3mg TID   - Metoprolol succ 200mg   - Verapamil 360mg   - Monitor BP

## 2025-05-05 NOTE — PROVIDER CONTACT NOTE (OTHER) - SITUATION
pt /115 and monitor tech called RN that HR went as low as 40's with a possible dropped beat while she was sleeping

## 2025-05-05 NOTE — H&P ADULT - PROBLEM SELECTOR PLAN 1
SOB progressively getting worst   Most likely triggered by Covid   - Isolation   - 2L-NC wean as tolerated   - Duoneb   - Azithromycin   - Decadron   - PPI   - DVT prophylaxis

## 2025-05-05 NOTE — ED ADULT NURSE REASSESSMENT NOTE - NS ED NURSE REASSESS COMMENT FT1
Assumed care of patient. patient states that MD told her she can be d/c'ed. repeat vitals bp 117/101, md made aware and at bedside. patient states that she wants to leave. patient walked on room air and only able to walk a few feet before she felt shortness of breath and O2 sat 94% on room air while ambulating. patient now requesting to stay. MD made aware
Patient appears anxious and was slightly non-compliant while drawing labs. Patient was notified that she was ordered catapres but said she was going to refuse the medicine. Dr Thompson and Dr Gómez notified and will add orders for anxiolytic medications to help patient.
Patient is Covid+. Notified charge RN and patient is being moved to [facility].
Patient found crawling on the floor. when asked what happened patient states that she lost her balance and fell. patient denies hitting her head, denies LOC. MD made aware. Charge RN made aware. patient assisted back to stretcher. safety at bedside. patient placed on CM, sr 60.

## 2025-05-05 NOTE — ED ADULT NURSE REASSESSMENT NOTE - NSFALLRISKINTERV_ED_ALL_ED
Assistance OOB with selected safe patient handling equipment if applicable/Assistance with ambulation/Communicate fall risk and risk factors to all staff, patient, and family/Encourage patient to sit up slowly, dangle for a short time, stand at bedside before walking/Monitor gait and stability/Orthostatic vital signs/Provide visual cue: yellow wristband, yellow gown, etc/Reinforce activity limits and safety measures with patient and family/Use of alarms - bed, stretcher, chair and/or video monitoring/Call bell, personal items and telephone in reach/Instruct patient to call for assistance before getting out of bed/chair/stretcher/Non-slip footwear applied when patient is off stretcher/[place] to call system/Physically safe environment - no spills, clutter or unnecessary equipment/Purposeful Proactive Rounding/Room/bathroom lighting operational, light cord in reach

## 2025-05-05 NOTE — H&P ADULT - PROBLEM SELECTOR PLAN 3
Tried to get up in the ED felt weak, lowered herself on to the floor, landed on her shoulder - No head strike.   L-shoulder pain with minimal ROM due to pain   - Pain control   - F/U xray

## 2025-05-05 NOTE — H&P ADULT - NSHPLABSRESULTS_GEN_ALL_CORE
10.0   12.69 )-----------( 245      ( 04 May 2025 21:40 )             32.2     141  |  102  |  18.5  ----------------------------<  283[H]     05-04  4.1   |  27.0  |  0.93    Ca    8.2[L]      04 May 2025 21:40    TPro  6.0[L]  /  Alb  3.4  /  TBili  <0.2[L]  /  DBili  x   /  AST  20  /  ALT  18  /  AlkPhos  91  05-04    PT/INR: 10.9/0.96 (05-04-25 @ 21:40)  PTT: 33.8 (05-04-25 @ 21:40)      hs Troponin, T - 18 ng/L (05-05-25 @ 00:45)  hs Troponin, T - 20 ng/L (05-04-25 @ 21:40)      Pro-BNP: 533 (05-04-25 @ 21:40)

## 2025-05-05 NOTE — H&P ADULT - NSHPPHYSICALEXAM_GEN_ALL_CORE
GENERAL: NAD, comfortable in bed. Saturating well in 2L-NC   HEAD:  Atraumatic, Normocephalic  EYES: EOMI, PERRL, conjunctiva and sclera clear  NECK: Supple, No JVD  LUNG: Diffused wheezing, No increase work of breathing   HEART: Regular rate and rhythm; No murmurs, rubs, or gallops  ABDOMEN: Soft, Nontender, Nondistended; Normal Bowel sounds   EXTREMITIES:  2+ Peripheral Pulses, No clubbing, cyanosis, or edema. L-Shoulder tenderness with LROM due to pain   PSYCH: normal mood and affect  NEUROLOGY: AAOx3, non-focal   SKIN: No rashes or lesions

## 2025-05-05 NOTE — H&P ADULT - HISTORY OF PRESENT ILLNESS
59 year old female with a PMH of HTN, DM, Asthma/COPD, Current smoker (4-5 cig/day) presents to the ED for SOB. [place] [facility] [place] exacerbation. Endorses since being discharges has been having SOB progressively getting worse.  Patient currently resides in a shelter, has not been taking her medications. In the ED found to be hypoxic in the 80s in RA required O2, tested positive for Covid, also found to be hypertensive SBP>200. Upon evaluation, resting comfortably in bed, NAD, eating a sandwich. Saturating well on 2L-NS. Endorses she is feeling much better, reports L-shoulder pain, denies any other pain or discomfort. Labs remarkable for WBC:12.69 H/H:10.0/33.2, BNP:533 COVID-19 (+).  BP: 205/113, HR:60 RR: 20, temp: 98.1. Received Duonebx3, solumedrol, Hydralazine 10mg IV KAREN & Valsartan 80mg PO.

## 2025-05-06 LAB
A1C WITH ESTIMATED AVERAGE GLUCOSE RESULT: 8.2 % — HIGH (ref 4–5.6)
ALBUMIN SERPL ELPH-MCNC: 3.3 G/DL — SIGNIFICANT CHANGE UP (ref 3.3–5.2)
ALP SERPL-CCNC: 97 U/L — SIGNIFICANT CHANGE UP (ref 40–120)
ALT FLD-CCNC: 25 U/L — SIGNIFICANT CHANGE UP
ANION GAP SERPL CALC-SCNC: 14 MMOL/L — SIGNIFICANT CHANGE UP (ref 5–17)
AST SERPL-CCNC: 19 U/L — SIGNIFICANT CHANGE UP
BASOPHILS # BLD AUTO: 0.01 K/UL — SIGNIFICANT CHANGE UP (ref 0–0.2)
BASOPHILS NFR BLD AUTO: 0.1 % — SIGNIFICANT CHANGE UP (ref 0–2)
BILIRUB DIRECT SERPL-MCNC: <0.1 MG/DL — SIGNIFICANT CHANGE UP (ref 0–0.3)
BILIRUB INDIRECT FLD-MCNC: SIGNIFICANT CHANGE UP MG/DL (ref 0.2–1)
BILIRUB SERPL-MCNC: <0.2 MG/DL — LOW (ref 0.4–2)
BUN SERPL-MCNC: 23.9 MG/DL — HIGH (ref 8–20)
CALCIUM SERPL-MCNC: 8.5 MG/DL — SIGNIFICANT CHANGE UP (ref 8.4–10.5)
CHLORIDE SERPL-SCNC: 100 MMOL/L — SIGNIFICANT CHANGE UP (ref 96–108)
CO2 SERPL-SCNC: 25 MMOL/L — SIGNIFICANT CHANGE UP (ref 22–29)
CREAT SERPL-MCNC: 1.05 MG/DL — SIGNIFICANT CHANGE UP (ref 0.5–1.3)
EGFR: 60 ML/MIN/1.73M2 — SIGNIFICANT CHANGE UP
EGFR: 60 ML/MIN/1.73M2 — SIGNIFICANT CHANGE UP
EOSINOPHIL # BLD AUTO: 0.05 K/UL — SIGNIFICANT CHANGE UP (ref 0–0.5)
EOSINOPHIL NFR BLD AUTO: 0.3 % — SIGNIFICANT CHANGE UP (ref 0–6)
ESTIMATED AVERAGE GLUCOSE: 189 MG/DL — HIGH (ref 68–114)
GLUCOSE BLDC GLUCOMTR-MCNC: 158 MG/DL — HIGH (ref 70–99)
GLUCOSE BLDC GLUCOMTR-MCNC: 175 MG/DL — HIGH (ref 70–99)
GLUCOSE BLDC GLUCOMTR-MCNC: 189 MG/DL — HIGH (ref 70–99)
GLUCOSE BLDC GLUCOMTR-MCNC: 218 MG/DL — HIGH (ref 70–99)
GLUCOSE SERPL-MCNC: 249 MG/DL — HIGH (ref 70–99)
HCT VFR BLD CALC: 33.4 % — LOW (ref 34.5–45)
HGB BLD-MCNC: 10.3 G/DL — LOW (ref 11.5–15.5)
IMM GRANULOCYTES # BLD AUTO: 0.18 K/UL — HIGH (ref 0–0.07)
IMM GRANULOCYTES NFR BLD AUTO: 0.9 % — SIGNIFICANT CHANGE UP (ref 0–0.9)
LYMPHOCYTES # BLD AUTO: 1.41 K/UL — SIGNIFICANT CHANGE UP (ref 1–3.3)
LYMPHOCYTES NFR BLD AUTO: 7.3 % — LOW (ref 13–44)
MAGNESIUM SERPL-MCNC: 2.3 MG/DL — SIGNIFICANT CHANGE UP (ref 1.6–2.6)
MCHC RBC-ENTMCNC: 28.6 PG — SIGNIFICANT CHANGE UP (ref 27–34)
MCHC RBC-ENTMCNC: 30.8 G/DL — LOW (ref 32–36)
MCV RBC AUTO: 92.8 FL — SIGNIFICANT CHANGE UP (ref 80–100)
MONOCYTES # BLD AUTO: 1.1 K/UL — HIGH (ref 0–0.9)
MONOCYTES NFR BLD AUTO: 5.7 % — SIGNIFICANT CHANGE UP (ref 2–14)
MRSA PCR RESULT.: SIGNIFICANT CHANGE UP
NEUTROPHILS # BLD AUTO: 16.65 K/UL — HIGH (ref 1.8–7.4)
NEUTROPHILS NFR BLD AUTO: 85.7 % — HIGH (ref 43–77)
NRBC # BLD AUTO: 0.02 K/UL — HIGH (ref 0–0)
NRBC # FLD: 0.02 K/UL — HIGH (ref 0–0)
NRBC BLD AUTO-RTO: 0 /100 WBCS — SIGNIFICANT CHANGE UP (ref 0–0)
PHOSPHATE SERPL-MCNC: 3.4 MG/DL — SIGNIFICANT CHANGE UP (ref 2.4–4.7)
PLATELET # BLD AUTO: 286 K/UL — SIGNIFICANT CHANGE UP (ref 150–400)
PMV BLD: 11.1 FL — SIGNIFICANT CHANGE UP (ref 7–13)
POTASSIUM SERPL-MCNC: 4.2 MMOL/L — SIGNIFICANT CHANGE UP (ref 3.5–5.3)
POTASSIUM SERPL-SCNC: 4.2 MMOL/L — SIGNIFICANT CHANGE UP (ref 3.5–5.3)
PROT SERPL-MCNC: 6.4 G/DL — LOW (ref 6.6–8.7)
RBC # BLD: 3.6 M/UL — LOW (ref 3.8–5.2)
RBC # FLD: 14.8 % — HIGH (ref 10.3–14.5)
S AUREUS DNA NOSE QL NAA+PROBE: SIGNIFICANT CHANGE UP
SODIUM SERPL-SCNC: 139 MMOL/L — SIGNIFICANT CHANGE UP (ref 135–145)
WBC # BLD: 19.4 K/UL — HIGH (ref 3.8–10.5)
WBC # FLD AUTO: 19.4 K/UL — HIGH (ref 3.8–10.5)

## 2025-05-06 PROCEDURE — 99233 SBSQ HOSP IP/OBS HIGH 50: CPT

## 2025-05-06 RX ORDER — PREDNISONE 20 MG/1
40 TABLET ORAL DAILY
Refills: 0 | Status: DISCONTINUED | OUTPATIENT
Start: 2025-05-07 | End: 2025-05-09

## 2025-05-06 RX ADMIN — Medication 120 MILLIGRAM(S): at 11:56

## 2025-05-06 RX ADMIN — ROSUVASTATIN CALCIUM 20 MILLIGRAM(S): 20 TABLET, FILM COATED ORAL at 22:02

## 2025-05-06 RX ADMIN — IPRATROPIUM BROMIDE AND ALBUTEROL SULFATE 3 MILLILITER(S): .5; 2.5 SOLUTION RESPIRATORY (INHALATION) at 04:29

## 2025-05-06 RX ADMIN — IPRATROPIUM BROMIDE AND ALBUTEROL SULFATE 3 MILLILITER(S): .5; 2.5 SOLUTION RESPIRATORY (INHALATION) at 20:56

## 2025-05-06 RX ADMIN — IPRATROPIUM BROMIDE AND ALBUTEROL SULFATE 3 MILLILITER(S): .5; 2.5 SOLUTION RESPIRATORY (INHALATION) at 14:44

## 2025-05-06 RX ADMIN — INSULIN LISPRO 2: 100 INJECTION, SOLUTION INTRAVENOUS; SUBCUTANEOUS at 12:47

## 2025-05-06 RX ADMIN — Medication 120 MILLIGRAM(S): at 22:43

## 2025-05-06 RX ADMIN — Medication 0.3 MILLIGRAM(S): at 22:02

## 2025-05-06 RX ADMIN — INSULIN GLARGINE-YFGN 10 UNIT(S): 100 INJECTION, SOLUTION SUBCUTANEOUS at 22:02

## 2025-05-06 RX ADMIN — IPRATROPIUM BROMIDE AND ALBUTEROL SULFATE 3 MILLILITER(S): .5; 2.5 SOLUTION RESPIRATORY (INHALATION) at 08:14

## 2025-05-06 RX ADMIN — INSULIN LISPRO 2: 100 INJECTION, SOLUTION INTRAVENOUS; SUBCUTANEOUS at 08:36

## 2025-05-06 RX ADMIN — INSULIN LISPRO 2: 100 INJECTION, SOLUTION INTRAVENOUS; SUBCUTANEOUS at 16:35

## 2025-05-06 RX ADMIN — Medication 0.3 MILLIGRAM(S): at 11:57

## 2025-05-06 RX ADMIN — ISOSORBIDE MONONITRATE 60 MILLIGRAM(S): 60 TABLET, EXTENDED RELEASE ORAL at 11:57

## 2025-05-06 RX ADMIN — Medication 250 MILLIGRAM(S): at 07:18

## 2025-05-06 NOTE — PROGRESS NOTE ADULT - ASSESSMENT
59 year old female with a PMH of HTN, DM, Asthma/COPD, Current smoker (4-5 cig/day) presents to the ED for SOB. Patient was recently discharged from [facility] (4/27/25) for COPD exacerbation. Endorses since being discharges has been having SOB progressively getting worse. Patient currently resides in a shelter, has not been taking her medications.     Acute hypoxemic respiratory failure 2/2 acute COPD exacerbation due to Covid-19  - Covid-19 isolation precautions  - Continue bronchodilators  - Switch Solumedrol to Prednisone  - Continue Azithromycin  - Supplemental oxygen PRN, wean as tolerated. Currently on 2L NC.    HTN  - Continue home medications    Left shoulder pain  - Xray negative  - Resolved    Diabetes mellitus  - A1C: 8.2  - FS/ISS  - CC diet    Anemia  - Stable  - Monitor CBC, transfuse PRN    DVT ppx: Lovenox    Dispo: Anticipate discharge home pending weaning of O2, likely within 24-48 hours   Metabolic acidemia Anemia in chronic kidney disease, unspecified CKD stage

## 2025-05-07 LAB
GLUCOSE BLDC GLUCOMTR-MCNC: 204 MG/DL — HIGH (ref 70–99)
GLUCOSE BLDC GLUCOMTR-MCNC: 264 MG/DL — HIGH (ref 70–99)
GLUCOSE BLDC GLUCOMTR-MCNC: 284 MG/DL — HIGH (ref 70–99)
GLUCOSE BLDC GLUCOMTR-MCNC: 303 MG/DL — HIGH (ref 70–99)

## 2025-05-07 PROCEDURE — 99232 SBSQ HOSP IP/OBS MODERATE 35: CPT

## 2025-05-07 RX ORDER — LIDOCAINE HYDROCHLORIDE 20 MG/ML
1 JELLY TOPICAL DAILY
Refills: 0 | Status: DISCONTINUED | OUTPATIENT
Start: 2025-05-07 | End: 2025-05-09

## 2025-05-07 RX ADMIN — Medication 120 MILLIGRAM(S): at 06:00

## 2025-05-07 RX ADMIN — ROSUVASTATIN CALCIUM 20 MILLIGRAM(S): 20 TABLET, FILM COATED ORAL at 21:29

## 2025-05-07 RX ADMIN — INSULIN LISPRO 2: 100 INJECTION, SOLUTION INTRAVENOUS; SUBCUTANEOUS at 21:30

## 2025-05-07 RX ADMIN — PREDNISONE 40 MILLIGRAM(S): 20 TABLET ORAL at 06:00

## 2025-05-07 RX ADMIN — Medication 0.3 MILLIGRAM(S): at 21:30

## 2025-05-07 RX ADMIN — LIDOCAINE HYDROCHLORIDE 1 PATCH: 20 JELLY TOPICAL at 21:31

## 2025-05-07 RX ADMIN — IPRATROPIUM BROMIDE AND ALBUTEROL SULFATE 3 MILLILITER(S): .5; 2.5 SOLUTION RESPIRATORY (INHALATION) at 21:45

## 2025-05-07 RX ADMIN — INSULIN LISPRO 8: 100 INJECTION, SOLUTION INTRAVENOUS; SUBCUTANEOUS at 11:40

## 2025-05-07 RX ADMIN — Medication 0.3 MILLIGRAM(S): at 06:00

## 2025-05-07 RX ADMIN — METOPROLOL SUCCINATE 100 MILLIGRAM(S): 50 TABLET, EXTENDED RELEASE ORAL at 06:00

## 2025-05-07 RX ADMIN — LIDOCAINE HYDROCHLORIDE 1 PATCH: 20 JELLY TOPICAL at 11:41

## 2025-05-07 RX ADMIN — IPRATROPIUM BROMIDE AND ALBUTEROL SULFATE 3 MILLILITER(S): .5; 2.5 SOLUTION RESPIRATORY (INHALATION) at 08:57

## 2025-05-07 RX ADMIN — Medication 650 MILLIGRAM(S): at 05:59

## 2025-05-07 RX ADMIN — Medication 0.3 MILLIGRAM(S): at 11:41

## 2025-05-07 RX ADMIN — Medication 120 MILLIGRAM(S): at 12:59

## 2025-05-07 RX ADMIN — INSULIN GLARGINE-YFGN 10 UNIT(S): 100 INJECTION, SOLUTION SUBCUTANEOUS at 21:30

## 2025-05-07 RX ADMIN — IPRATROPIUM BROMIDE AND ALBUTEROL SULFATE 3 MILLILITER(S): .5; 2.5 SOLUTION RESPIRATORY (INHALATION) at 14:45

## 2025-05-07 RX ADMIN — ISOSORBIDE MONONITRATE 60 MILLIGRAM(S): 60 TABLET, EXTENDED RELEASE ORAL at 12:59

## 2025-05-07 RX ADMIN — Medication 120 MILLIGRAM(S): at 21:30

## 2025-05-07 RX ADMIN — INSULIN LISPRO 6: 100 INJECTION, SOLUTION INTRAVENOUS; SUBCUTANEOUS at 16:24

## 2025-05-07 RX ADMIN — INSULIN LISPRO 4: 100 INJECTION, SOLUTION INTRAVENOUS; SUBCUTANEOUS at 08:50

## 2025-05-07 RX ADMIN — Medication 250 MILLIGRAM(S): at 06:00

## 2025-05-07 NOTE — PROGRESS NOTE ADULT - TIME BILLING
I have spent 55 minutes of time on the encounter which exlcudes teaching and separately reported services. Time spent reviewing the chart documentation, revieweing labs and imaging studies, evaluating the patient, discussing the plan of care with the consultants & medical team, and documenting.

## 2025-05-07 NOTE — DIETITIAN INITIAL EVALUATION ADULT - ORAL INTAKE PTA/DIET HISTORY
Met with pt who reports good po intake, no weight changes and no GI issues. A1C 8% noted.  Provided Heart healthy, cons CHO diet education packet with good comprehension.

## 2025-05-07 NOTE — DIETITIAN INITIAL EVALUATION ADULT - OTHER INFO
59 year old female with a PMH of HTN, DM, Asthma/COPD, Current smoker (4-5 cig/day) presents to the ED for SOB. Patient was recently discharged from [facility] [place] (4/27/25) for COPD exacerbation. Endorses since being discharges has been having SOB progressively getting worse. Patient currently resides in a shelter, has not been taking her medications.

## 2025-05-07 NOTE — DIETITIAN NUTRITION RISK NOTIFICATION - TREATMENT: THE FOLLOWING DIET HAS BEEN RECOMMENDED
Diet, DASH/TLC:   Sodium & Cholesterol Restricted  Consistent Carbohydrate {No Snacks} (CSTCHO) (05-05-25 @ 08:30) [Active]

## 2025-05-07 NOTE — PROGRESS NOTE ADULT - ASSESSMENT
59 year old female with a PMH of HTN, DM, Asthma/COPD, Current smoker (4-5 cig/day) presents to the ED for SOB. [place] [facility] [place] exacerbation. Endorses since being discharges has been having SOB progressively getting worse. Patient currently resides in a shelter, has not been taking her medications.     Acute hypoxemic respiratory failure 2/2 acute COPD exacerbation due to Covid-19  - Covid-19 isolation precautions  - Continue bronchodilators  - Prednisone  - Continue Azithromycin  - Supplemental oxygen PRN, wean as tolerated. Currently on 2L NC.    HTN  - Continue home medications    Left shoulder pain  - Xray negative  - Resolved    Diabetes mellitus  - A1C: 8.2  - FS/ISS  - CC diet    Anemia  - Stable  - Monitor CBC, transfuse PRN    DVT ppx: Lovenox    Dispo: Anticipate discharge home pending weaning of O2, likely within 24-48 hours

## 2025-05-07 NOTE — DIETITIAN INITIAL EVALUATION ADULT - PERTINENT LABORATORY DATA
05-06    139  |  100  |  23.9[H]  ----------------------------<  249[H]  4.2   |  25.0  |  1.05    Ca    8.5      06 May 2025 04:35  Phos  3.4     05-06  Mg     2.3     05-06    TPro  6.4[L]  /  Alb  3.3  /  TBili  <0.2[L]  /  DBili  <0.1  /  AST  19  /  ALT  25  /  AlkPhos  97  05-06  POCT Blood Glucose.: 204 mg/dL (05-07-25 @ 08:48)  A1C with Estimated Average Glucose Result: 8.2 % (05-06-25 @ 04:35)  A1C with Estimated Average Glucose Result: 8.2 % (05-05-25 @ 06:28)

## 2025-05-07 NOTE — DIETITIAN INITIAL EVALUATION ADULT - PERTINENT MEDS FT
MEDICATIONS  (STANDING):  albuterol/ipratropium for Nebulization 3 milliLiter(s) Nebulizer every 6 hours  azithromycin  IVPB 500 milliGRAM(s) IV Intermittent every 24 hours  cloNIDine 0.3 milliGRAM(s) Oral three times a day  enoxaparin Injectable 40 milliGRAM(s) SubCutaneous every 12 hours  glucagon  Injectable 1 milliGRAM(s) IntraMuscular once  insulin glargine Injectable (LANTUS) 10 Unit(s) SubCutaneous at bedtime  rosuvastatin 20 milliGRAM(s) Oral at bedtime  verapamil 120 milliGRAM(s) Oral every 8 hours    MEDICATIONS  (PRN):  acetaminophen     Tablet .. 650 milliGRAM(s) Oral every 6 hours PRN Temp greater or equal to 38C (100.4F), Mild Pain (1 - 3)  albuterol    90 MICROgram(s) HFA Inhaler 2 Puff(s) Inhalation every 6 hours PRN for shortness of breath and/or wheezing  aluminum hydroxide/magnesium hydroxide/simethicone Suspension 30 milliLiter(s) Oral every 4 hours PRN Dyspepsia  dextrose Oral Gel 15 Gram(s) Oral once PRN Blood Glucose LESS THAN 70 milliGRAM(s)/deciliter  hydrALAZINE Injectable 10 milliGRAM(s) IV Push every 6 hours PRN SBP>180  melatonin 3 milliGRAM(s) Oral at bedtime PRN Insomnia  ondansetron Injectable 4 milliGRAM(s) IV Push every 8 hours PRN Nausea and/or Vomiting

## 2025-05-08 ENCOUNTER — TRANSCRIPTION ENCOUNTER (OUTPATIENT)
Age: 60
End: 2025-05-08

## 2025-05-08 LAB
GLUCOSE BLDC GLUCOMTR-MCNC: 159 MG/DL — HIGH (ref 70–99)
GLUCOSE BLDC GLUCOMTR-MCNC: 261 MG/DL — HIGH (ref 70–99)
GLUCOSE BLDC GLUCOMTR-MCNC: 297 MG/DL — HIGH (ref 70–99)
GLUCOSE BLDC GLUCOMTR-MCNC: 371 MG/DL — HIGH (ref 70–99)
GLUCOSE BLDC GLUCOMTR-MCNC: 417 MG/DL — HIGH (ref 70–99)
GLUCOSE BLDC GLUCOMTR-MCNC: 447 MG/DL — HIGH (ref 70–99)
GLUCOSE BLDC GLUCOMTR-MCNC: 461 MG/DL — CRITICAL HIGH (ref 70–99)

## 2025-05-08 PROCEDURE — 99232 SBSQ HOSP IP/OBS MODERATE 35: CPT

## 2025-05-08 RX ORDER — METOPROLOL SUCCINATE 50 MG/1
200 TABLET, EXTENDED RELEASE ORAL DAILY
Refills: 0 | Status: DISCONTINUED | OUTPATIENT
Start: 2025-05-08 | End: 2025-05-09

## 2025-05-08 RX ORDER — INSULIN LISPRO 100 U/ML
15 INJECTION, SOLUTION INTRAVENOUS; SUBCUTANEOUS ONCE
Refills: 0 | Status: COMPLETED | OUTPATIENT
Start: 2025-05-08 | End: 2025-05-08

## 2025-05-08 RX ADMIN — LIDOCAINE HYDROCHLORIDE 1 PATCH: 20 JELLY TOPICAL at 13:38

## 2025-05-08 RX ADMIN — Medication 650 MILLIGRAM(S): at 17:59

## 2025-05-08 RX ADMIN — INSULIN LISPRO 8: 100 INJECTION, SOLUTION INTRAVENOUS; SUBCUTANEOUS at 21:09

## 2025-05-08 RX ADMIN — ENOXAPARIN SODIUM 40 MILLIGRAM(S): 100 INJECTION SUBCUTANEOUS at 04:47

## 2025-05-08 RX ADMIN — IPRATROPIUM BROMIDE AND ALBUTEROL SULFATE 3 MILLILITER(S): .5; 2.5 SOLUTION RESPIRATORY (INHALATION) at 21:13

## 2025-05-08 RX ADMIN — INSULIN LISPRO 6: 100 INJECTION, SOLUTION INTRAVENOUS; SUBCUTANEOUS at 13:43

## 2025-05-08 RX ADMIN — Medication 120 MILLIGRAM(S): at 13:33

## 2025-05-08 RX ADMIN — INSULIN LISPRO 6: 100 INJECTION, SOLUTION INTRAVENOUS; SUBCUTANEOUS at 18:14

## 2025-05-08 RX ADMIN — Medication 250 MILLIGRAM(S): at 08:54

## 2025-05-08 RX ADMIN — Medication 0.3 MILLIGRAM(S): at 13:33

## 2025-05-08 RX ADMIN — IPRATROPIUM BROMIDE AND ALBUTEROL SULFATE 3 MILLILITER(S): .5; 2.5 SOLUTION RESPIRATORY (INHALATION) at 09:36

## 2025-05-08 RX ADMIN — INSULIN LISPRO 15 UNIT(S): 100 INJECTION, SOLUTION INTRAVENOUS; SUBCUTANEOUS at 22:18

## 2025-05-08 RX ADMIN — Medication 0.3 MILLIGRAM(S): at 04:47

## 2025-05-08 RX ADMIN — Medication 0.3 MILLIGRAM(S): at 21:08

## 2025-05-08 RX ADMIN — INSULIN GLARGINE-YFGN 10 UNIT(S): 100 INJECTION, SOLUTION SUBCUTANEOUS at 21:09

## 2025-05-08 RX ADMIN — ISOSORBIDE MONONITRATE 60 MILLIGRAM(S): 60 TABLET, EXTENDED RELEASE ORAL at 13:33

## 2025-05-08 RX ADMIN — PREDNISONE 40 MILLIGRAM(S): 20 TABLET ORAL at 10:38

## 2025-05-08 RX ADMIN — IPRATROPIUM BROMIDE AND ALBUTEROL SULFATE 3 MILLILITER(S): .5; 2.5 SOLUTION RESPIRATORY (INHALATION) at 02:54

## 2025-05-08 RX ADMIN — LIDOCAINE HYDROCHLORIDE 1 PATCH: 20 JELLY TOPICAL at 21:56

## 2025-05-08 RX ADMIN — Medication 120 MILLIGRAM(S): at 21:09

## 2025-05-08 RX ADMIN — ROSUVASTATIN CALCIUM 20 MILLIGRAM(S): 20 TABLET, FILM COATED ORAL at 21:09

## 2025-05-08 RX ADMIN — METOPROLOL SUCCINATE 100 MILLIGRAM(S): 50 TABLET, EXTENDED RELEASE ORAL at 04:47

## 2025-05-08 RX ADMIN — IPRATROPIUM BROMIDE AND ALBUTEROL SULFATE 3 MILLILITER(S): .5; 2.5 SOLUTION RESPIRATORY (INHALATION) at 15:14

## 2025-05-08 RX ADMIN — INSULIN LISPRO 2: 100 INJECTION, SOLUTION INTRAVENOUS; SUBCUTANEOUS at 09:01

## 2025-05-08 RX ADMIN — Medication 120 MILLIGRAM(S): at 04:47

## 2025-05-08 RX ADMIN — Medication 650 MILLIGRAM(S): at 18:59

## 2025-05-08 NOTE — PROGRESS NOTE ADULT - SUBJECTIVE AND OBJECTIVE BOX
60y/oF HTN, DM, asthma/copd, current smoker, admitted o/n with HTN urgency and Acute on chronic hypoxic respiratory failure 2/2 COPD exacerbation. pt recently d/c'ed from [facility] (4/27), where she was also treated for COPD exacerbation, as well as covid+ (4/25/25) and prior to that, d/c'ed from .   Pt adamant that she is not diabetic. Additionally reporting she did not  any new rx upon discharge and recently ran out of verapamil .  Titrating anti-hypertensives and insulin regimen. Wean supplemental O2 as tolerated.   Steroids changed to IV solumedrol as has been Covid+ about 10 days, no role for remdesivr at this time. 
RONEN QUEENA  59y  Female      Patient is a 59y old  Female who presents with a chief complaint of COPD exacerbation with COVID-19 (06 May 2025 12:33)      INTERVAL HPI/OVERNIGHT EVENTS:  Seen and examined says breathing and cough have improved  Complains of left shoulder pain      REVIEW OF SYSTEMS:  as above      T(C): 36.9 (05-07-25 @ 09:31), Max: 37.2 (05-06-25 @ 11:49)  HR: 66 (05-07-25 @ 09:31) (55 - 70)  BP: 157/102 (05-07-25 @ 09:31) (123/80 - 162/97)  RR: 18 (05-07-25 @ 09:31) (16 - 18)  SpO2: 96% (05-07-25 @ 09:31) (96% - 100%)  Wt(kg): --Vital Signs Last 24 Hrs  T(C): 36.9 (07 May 2025 09:31), Max: 37.2 (06 May 2025 11:49)  T(F): 98.4 (07 May 2025 09:31), Max: 98.9 (06 May 2025 11:49)  HR: 66 (07 May 2025 09:31) (55 - 70)  BP: 157/102 (07 May 2025 09:31) (123/80 - 162/97)  BP(mean): 113 (06 May 2025 11:49) (113 - 113)  RR: 18 (07 May 2025 09:31) (16 - 18)  SpO2: 96% (07 May 2025 09:31) (96% - 100%)    Parameters below as of 07 May 2025 09:31  Patient On (Oxygen Delivery Method): room air        PHYSICAL EXAM:  CONSTITUTIONAL: NAD  HEENT: NC/AT, PERRL, no JVD  RESPIRATORY: diminished air movement  CARDIOVASCULAR: RRR, S1/S2+, no m/g/r  ABDOMEN: Nontender to palpation, normoactive bowel sounds, no rebound/guarding  MUSCULOSKELETAL: No edema, cyanosis or deformities.  PSYCH: Calm, affect appropriate.  NEUROLOGY: Awake, alert, no focal neurological deficits.   SKIN: No rashes; no palpable lesions  VASC: Distal pulses palpable      Consultant(s) Notes Reviewed:  [x ] YES  [ ] NO  Care Discussed with Consultants/Other Providers [ x] YES  [ ] NO    LABS:                        10.3   19.40 )-----------( 286      ( 06 May 2025 04:35 )             33.4     05-06    139  |  100  |  23.9[H]  ----------------------------<  249[H]  4.2   |  25.0  |  1.05    Ca    8.5      06 May 2025 04:35  Phos  3.4     05-06  Mg     2.3     05-06    TPro  6.4[L]  /  Alb  3.3  /  TBili  <0.2[L]  /  DBili  <0.1  /  AST  19  /  ALT  25  /  AlkPhos  97  05-06      Urinalysis Basic - ( 06 May 2025 04:35 )    Color: x / Appearance: x / SG: x / pH: x  Gluc: 249 mg/dL / Ketone: x  / Bili: x / Urobili: x   Blood: x / Protein: x / Nitrite: x   Leuk Esterase: x / RBC: x / WBC x   Sq Epi: x / Non Sq Epi: x / Bacteria: x      CAPILLARY BLOOD GLUCOSE      POCT Blood Glucose.: 204 mg/dL (07 May 2025 08:48)  POCT Blood Glucose.: 218 mg/dL (06 May 2025 21:57)  POCT Blood Glucose.: 189 mg/dL (06 May 2025 16:33)  POCT Blood Glucose.: 158 mg/dL (06 May 2025 12:27)        Urinalysis Basic - ( 06 May 2025 04:35 )    Color: x / Appearance: x / SG: x / pH: x  Gluc: 249 mg/dL / Ketone: x  / Bili: x / Urobili: x   Blood: x / Protein: x / Nitrite: x   Leuk Esterase: x / RBC: x / WBC x   Sq Epi: x / Non Sq Epi: x / Bacteria: x        RADIOLOGY & ADDITIONAL TESTS:    Imaging Personally Reviewed:  [ ] YES  [ ] NO    HEALTH ISSUES - PROBLEM Dx:  COPD with exacerbation    Hypertension    Shoulder pain, left    Diabetes mellitus    Anemia        
[facility] Division of Hospital Medicine  Frank Whiting MD    Chief Complaint:  Patient is a 59y old  Female who presents with a chief complaint of COPD exacerbation with COVID-19 (05 May 2025 14:57)      SUBJECTIVE / OVERNIGHT EVENTS:  Patient seen and examined at bedside. No acute events reported overnight. No new complaints. Feeling better.    MEDICATIONS  (STANDING):  albuterol/ipratropium for Nebulization 3 milliLiter(s) Nebulizer every 6 hours  azithromycin  IVPB 500 milliGRAM(s) IV Intermittent every 24 hours  cloNIDine 0.3 milliGRAM(s) Oral three times a day  dextrose 5%. 1000 milliLiter(s) (50 mL/Hr) IV Continuous <Continuous>  dextrose 5%. 1000 milliLiter(s) (100 mL/Hr) IV Continuous <Continuous>  dextrose 50% Injectable 25 Gram(s) IV Push once  dextrose 50% Injectable 12.5 Gram(s) IV Push once  dextrose 50% Injectable 25 Gram(s) IV Push once  enoxaparin Injectable 40 milliGRAM(s) SubCutaneous every 12 hours  glucagon  Injectable 1 milliGRAM(s) IntraMuscular once  insulin glargine Injectable (LANTUS) 10 Unit(s) SubCutaneous at bedtime  insulin lispro (ADMELOG) corrective regimen sliding scale   SubCutaneous at bedtime  insulin lispro (ADMELOG) corrective regimen sliding scale   SubCutaneous three times a day before meals  isosorbide   mononitrate ER Tablet (IMDUR) 60 milliGRAM(s) Oral daily  metoprolol succinate  milliGRAM(s) Oral daily  rosuvastatin 20 milliGRAM(s) Oral at bedtime  verapamil 120 milliGRAM(s) Oral every 8 hours    MEDICATIONS  (PRN):  acetaminophen     Tablet .. 650 milliGRAM(s) Oral every 6 hours PRN Temp greater or equal to 38C (100.4F), Mild Pain (1 - 3)  albuterol    90 MICROgram(s) HFA Inhaler 2 Puff(s) Inhalation every 6 hours PRN for shortness of breath and/or wheezing  aluminum hydroxide/magnesium hydroxide/simethicone Suspension 30 milliLiter(s) Oral every 4 hours PRN Dyspepsia  dextrose Oral Gel 15 Gram(s) Oral once PRN Blood Glucose LESS THAN 70 milliGRAM(s)/deciliter  hydrALAZINE Injectable 10 milliGRAM(s) IV Push every 6 hours PRN SBP>180  melatonin 3 milliGRAM(s) Oral at bedtime PRN Insomnia  ondansetron Injectable 4 milliGRAM(s) IV Push every 8 hours PRN Nausea and/or Vomiting        I&O's Summary      PHYSICAL EXAM:  Vital Signs Last 24 Hrs  T(C): 37.2 (06 May 2025 11:49), Max: 37.2 (06 May 2025 11:49)  T(F): 98.9 (06 May 2025 11:49), Max: 98.9 (06 May 2025 11:49)  HR: 57 (06 May 2025 11:49) (53 - 75)  BP: 136/103 (06 May 2025 11:49) (112/56 - 144/95)  BP(mean): 113 (06 May 2025 11:49) (113 - 113)  RR: 16 (06 May 2025 11:49) (14 - 18)  SpO2: 100% (06 May 2025 11:49) (96% - 100%)    Parameters below as of 06 May 2025 11:49  Patient On (Oxygen Delivery Method): nasal cannula  O2 Flow (L/min): 2          CONSTITUTIONAL: NAD  HEENT: NC/AT, PERRL, no JVD  RESPIRATORY: B/l exp wheezing (mild)  CARDIOVASCULAR: RRR, S1/S2+, no m/g/r  ABDOMEN: Nontender to palpation, normoactive bowel sounds, no rebound/guarding  MUSCULOSKELETAL: No edema, cyanosis or deformities.  PSYCH: Calm, affect appropriate.  NEUROLOGY: Awake, alert, no focal neurological deficits.   SKIN: No rashes; no palpable lesions  VASC: Distal pulses palpable    LABS:                        10.3   19.40 )-----------( 286      ( 06 May 2025 04:35 )             33.4     05-06    139  |  100  |  23.9[H]  ----------------------------<  249[H]  4.2   |  25.0  |  1.05    Ca    8.5      06 May 2025 04:35  Phos  3.4     05-06  Mg     2.3     05-06    TPro  6.4[L]  /  Alb  3.3  /  TBili  <0.2[L]  /  DBili  <0.1  /  AST  19  /  ALT  25  /  AlkPhos  97  05-06    PT/INR - ( 04 May 2025 21:40 )   PT: 10.9 sec;   INR: 0.96 ratio         PTT - ( 04 May 2025 21:40 )  PTT:33.8 sec      Urinalysis Basic - ( 06 May 2025 04:35 )    Color: x / Appearance: x / SG: x / pH: x  Gluc: 249 mg/dL / Ketone: x  / Bili: x / Urobili: x   Blood: x / Protein: x / Nitrite: x   Leuk Esterase: x / RBC: x / WBC x   Sq Epi: x / Non Sq Epi: x / Bacteria: x        CAPILLARY BLOOD GLUCOSE      POCT Blood Glucose.: 158 mg/dL (06 May 2025 12:27)  POCT Blood Glucose.: 175 mg/dL (06 May 2025 08:18)  POCT Blood Glucose.: 252 mg/dL (05 May 2025 21:31)  POCT Blood Glucose.: 324 mg/dL (05 May 2025 17:05)        RADIOLOGY & ADDITIONAL TESTS:  Results Reviewed:   Imaging Personally Reviewed:  Electrocardiogram Personally Reviewed:                                          
SUBJECTIVE:  Chief Complaint: Patient is a 59y old  Female who presents with a chief complaint of COPD exacerbation with COVID-19 (08 May 2025 11:26)    INTERVAL HPI/LAST 24HRS EVENTS: Patient seen and examined at bedside at AM. No clinically acute event overnight.   Denies any chest pain, palpitations, SOB, PND, orthopnea, leg edema, N/V/D, or any other complaints.     MEDICATIONS  (STANDING):  albuterol/ipratropium for Nebulization 3 milliLiter(s) Nebulizer every 6 hours  azithromycin  IVPB 500 milliGRAM(s) IV Intermittent every 24 hours  cloNIDine 0.3 milliGRAM(s) Oral three times a day  dextrose 5%. 1000 milliLiter(s) (50 mL/Hr) IV Continuous <Continuous>  dextrose 5%. 1000 milliLiter(s) (100 mL/Hr) IV Continuous <Continuous>  dextrose 50% Injectable 25 Gram(s) IV Push once  dextrose 50% Injectable 12.5 Gram(s) IV Push once  dextrose 50% Injectable 25 Gram(s) IV Push once  enoxaparin Injectable 40 milliGRAM(s) SubCutaneous every 12 hours  glucagon  Injectable 1 milliGRAM(s) IntraMuscular once  insulin glargine Injectable (LANTUS) 10 Unit(s) SubCutaneous at bedtime  insulin lispro (ADMELOG) corrective regimen sliding scale   SubCutaneous at bedtime  insulin lispro (ADMELOG) corrective regimen sliding scale   SubCutaneous three times a day before meals  isosorbide   mononitrate ER Tablet (IMDUR) 60 milliGRAM(s) Oral daily  lidocaine   4% Patch 1 Patch Transdermal daily  metoprolol succinate  milliGRAM(s) Oral daily  predniSONE   Tablet 40 milliGRAM(s) Oral daily  rosuvastatin 20 milliGRAM(s) Oral at bedtime  verapamil 120 milliGRAM(s) Oral every 8 hours    MEDICATIONS  (PRN):  acetaminophen     Tablet .. 650 milliGRAM(s) Oral every 6 hours PRN Temp greater or equal to 38C (100.4F), Mild Pain (1 - 3)  albuterol    90 MICROgram(s) HFA Inhaler 2 Puff(s) Inhalation every 6 hours PRN for shortness of breath and/or wheezing  aluminum hydroxide/magnesium hydroxide/simethicone Suspension 30 milliLiter(s) Oral every 4 hours PRN Dyspepsia  dextrose Oral Gel 15 Gram(s) Oral once PRN Blood Glucose LESS THAN 70 milliGRAM(s)/deciliter  hydrALAZINE Injectable 10 milliGRAM(s) IV Push every 6 hours PRN SBP>180  melatonin 3 milliGRAM(s) Oral at bedtime PRN Insomnia  ondansetron Injectable 4 milliGRAM(s) IV Push every 8 hours PRN Nausea and/or Vomiting      Allergies  No Known Allergies  Intolerances      OBJECTIVE:  Vital Signs Last 24 Hrs  T(C): 36.8 (08 May 2025 08:00), Max: 37.2 (07 May 2025 20:11)  T(F): 98.3 (08 May 2025 08:00), Max: 99 (07 May 2025 20:11)  HR: 59 (08 May 2025 08:00) (59 - 71)  BP: 152/82 (08 May 2025 09:00) (152/82 - 185/98)  BP(mean): --  RR: 18 (08 May 2025 08:00) (18 - 18)  SpO2: 96% (08 May 2025 08:00) (94% - 96%)  Parameters below as of 08 May 2025 08:01  Patient On (Oxygen Delivery Method): room air      PHYSICAL EXAM:  CONSTITUTIONAL: NAD  HEENT: NC/AT, PERRL, no JVD  RESPIRATORY: diminished air movement  CARDIOVASCULAR: RRR, S1/S2+, no m/g/r  ABDOMEN: Nontender to palpation, normoactive bowel sounds, no rebound/guarding  MUSCULOSKELETAL: No edema, cyanosis or deformities.  PSYCH: Calm, affect appropriate.  NEUROLOGY: Awake, alert, no focal neurological deficits.   SKIN: No rashes; no palpable lesions  VASC: Distal pulses palpable      Lab/ Imaging:  LABS:    CAPILLARY BLOOD GLUCOSE  POCT Blood Glucose.: 261 mg/dL (08 May 2025 13:41)  POCT Blood Glucose.: 159 mg/dL (08 May 2025 08:12)  POCT Blood Glucose.: 284 mg/dL (07 May 2025 21:28)  POCT Blood Glucose.: 264 mg/dL (07 May 2025 16:20)

## 2025-05-08 NOTE — PROGRESS NOTE ADULT - ATTENDING COMMENTS
Chief Complaint   Patient presents with    Left Knee - Pain         HPI:   This is a 68 y.o. who presents to clinic today complaining of bilateral knee pain for 1 months after no known trauma. Pain is progressively worsening. No numbness or tingling. No associated signs or symptoms.    Past Medical History:   Diagnosis Date    Diabetes mellitus, type 2     Hypertension     Morbid obesity      Past Surgical History:   Procedure Laterality Date    HERNIA REPAIR      KNEE ARTHROSCOPY W/ DEBRIDEMENT      VASECTOMY       Current Outpatient Medications on File Prior to Visit   Medication Sig Dispense Refill    aspirin (ECOTRIN) 81 MG EC tablet Take 81 mg by mouth once daily.      atorvastatin (LIPITOR) 40 MG tablet TAKE 1 TABLET BY MOUTH EVERY DAY 90 tablet 1    cyclobenzaprine (FLEXERIL) 10 MG tablet TAKE 1 TABLET BY MOUTH EVERY DAY IN THE EVENING 30 tablet 0    dorzolamide-timolol 2-0.5% (COSOPT) 22.3-6.8 mg/mL ophthalmic solution Place 1 drop into the right eye 2 (two) times daily.      enalapril (VASOTEC) 20 MG tablet TAKE 1 TABLET BY MOUTH EVERY DAY 90 tablet 2    ibuprofen (ADVIL,MOTRIN) 800 MG tablet Take 800 mg by mouth every 8 (eight) hours as needed.      latanoprost 0.005 % ophthalmic solution Place 1 drop into both eyes every evening.      meloxicam (MOBIC) 15 MG tablet Take 1 tablet (15 mg total) by mouth once daily. 90 tablet 4    multivitamin with minerals tablet Take 1 tablet by mouth once daily.      semaglutide (OZEMPIC) 2 mg/dose (8 mg/3 mL) PnIj Inject 2 mg into the skin every 7 days. 9 mL 1    tadalafiL (CIALIS) 20 MG Tab Take 20 mg by mouth as needed.      testosterone cypionate (DEPOTESTOTERONE CYPIONATE) 100 mg/mL injection Inject 200 mg into the muscle every 14 (fourteen) days.       No current facility-administered medications on file prior to visit.     Review of patient's allergies indicates:   Allergen Reactions    Metformin      Intolerant     Penicillins Rash     Family History   Problem  Relation Name Age of Onset    Heart disease Mother Maribeth     Vision loss Mother Maribeth     Diabetes Father Ramon     Heart disease Father Ramon     Hypertension Father Ramon     Diabetes Maternal Grandmother Kathy     Cancer Maternal Grandfather Garrison      Social History     Socioeconomic History    Marital status:    Occupational History     Employer:  [employer]   Tobacco Use    Smoking status: Never    Smokeless tobacco: Never   Substance and Sexual Activity    Alcohol use: No    Drug use: No    Sexual activity: Yes     Partners: Female   Social History Narrative    . 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     Social Determinants of Health     Financial Resource Strain: Low Risk  (8/11/2020)    Overall Financial Resource Strain (CARDIA)     Difficulty of Paying Living Expenses: Not very hard   Food Insecurity: No Food Insecurity (8/11/2020)    Hunger Vital Sign     Worried About Running Out of Food in the Last Year: Never true     Ran Out of Food in the Last Year: Never true   Transportation Needs: No Transportation Needs (8/11/2020)    PRAPARE - Transportation     Lack of Transportation (Medical): No     Lack of Transportation (Non-Medical): No   Physical Activity: Sufficiently Active (8/11/2020)    Exercise Vital Sign     Days of Exercise per Week: 5 days     Minutes of Exercise per Session: 30 min   Stress: No Stress Concern Present (8/11/2020)    [place] [employer] - Occupational Stress Questionnaire     Feeling of Stress : Only a little       Review of Systems:  Constitutional:  Denies fever or chills   Eyes:  Denies change in visual acuity   HENT:  Denies nasal congestion or sore throat   Respiratory:  Denies cough or shortness of breath   Cardiovascular:  Denies chest pain or edema   GI:  Denies abdominal pain, nausea, vomiting, bloody stools or diarrhea   :  Denies dysuria   Integument:  Denies rash   Neurologic:  Denies headache, focal weakness or sensory changes    Endocrine:  Denies polyuria or polydipsia   Lymphatic:  Denies swollen glands   Psychiatric:  Denies depression or anxiety     Physical Exam:   Constitutional:  Well developed, well nourished, no acute distress, non-toxic appearance   Integument:  Well hydrated, no rash   Lymphatic:  No lymphadenopathy noted   Neurologic:  Alert & oriented x 3, CN 2-12 normal, normal motor function, normal sensory function, no focal deficits noted   Psychiatric:  Speech and behavior appropriate   Eyes: EOMI  Gi: abdomen soft    Bilateral Knee Exam    Tenderness   The patient is experiencing tenderness in the medial joint line.    Range of Motion   Extension: abnormal   Flexion: abnormal     Muscle Strength     The patient has normal knee strength.    Tests   Eben:  Medial - positive   Lachman:  Anterior - negative      Varus: negative  Valgus: negative  Patellar Apprehension: negative    Other   Erythema: absent  Sensation: normal  Pulse: present  Swelling: mild    X-rays were performed, personally reviewed by me and findings discussed with the patient.  3 views of the bilateral knee show tricompartmental degenerative change most pronounced in the medial compartment with Kellgren 3 changes    Primary osteoarthritis of right knee  -     Large Joint Aspiration/Injection: bilateral knee    Primary osteoarthritis of left knee  -     Large Joint Aspiration/Injection: bilateral knee            Using an aseptic technique, I injected 5 cc of lidocaine 1% without and 1 cc of kenalog 40mg into the bilateral Knee. The patient tolerated this well. I will have them return to clinic as needed. Will PA for gel.         Agree with above   Patient seen and examined together with medical residents   Vital signs,  labs and imaging  reviewed   Assesment and plan discussed

## 2025-05-08 NOTE — DISCHARGE NOTE PROVIDER - HOSPITAL COURSE
59 year old female with a PMH of HTN, DM, Asthma/COPD, Current smoker (4-5 cig/day) presents to the ED for SOB. Patient was recently discharged from [facility] (4/27/25) for COPD exacerbation. Endorses since being discharges has been having SOB progressively getting worse. Patient currently resides in a shelter, has not been taking her medications.     Patient admitted for  Acute hypoxemic respiratory failure 2/2 acute COPD exacerbation due to Covid-19. Symptoms improved with iv abx and O2 supplementation. On day of discharge patient stable on RA, no resp distress. Upon chart review and hx as per patient she first tested positive for COVID-19 on 4/24 at [facility]. Stable for medical discharge at this time. Recommend outpt Orthopedic follow up for left shoulder pain.   59 year old female with a PMH of HTN, DM, Asthma/COPD, Current smoker (4-5 cig/day) presents to the ED for SOB. Patient was recently discharged from [facility] (4/27/25) for COPD exacerbation. Endorses since being discharges has been having SOB progressively getting worse. Patient currently resides in a shelter, has not been taking her medications.     Patient admitted for  Acute hypoxemic respiratory failure 2/2 acute COPD exacerbation due to Covid-19. Symptoms improved with iv abx and O2 supplementation. On day of discharge patient stable on RA, no resp distress. Upon chart review and hx as per patient she first tested positive for COVID-19 on 4/24 at [facility]. Pt with uncontrolled DM, will need to follow with endo outpatient and uncontrolled HTN to be followed up with cardiology. Pt is currently on RA satting >95%.Stable for medical discharge at this time. Recommend outpt Orthopedic follow up for left shoulder pain.

## 2025-05-08 NOTE — PROGRESS NOTE ADULT - NUTRITIONAL ASSESSMENT
This patient has been assessed with a concern for Malnutrition and has been determined to have a diagnosis/diagnoses of Morbid obesity (BMI > 40).    This patient is being managed with:   Diet DASH/TLC-  Sodium & Cholesterol Restricted  Consistent Carbohydrate {No Snacks} (CSTCHO)  Entered: May  5 2025  8:30AM

## 2025-05-08 NOTE — DISCHARGE NOTE PROVIDER - ATTENDING DISCHARGE PHYSICAL EXAMINATION:
Physical Exam:  General: Well developed, well nourished, NAD  HEENT: NCAT, PERRLA, EOMI bl, moist mucous membranes   Neck: Supple, nontender, no mass  Neurology: A&Ox3   Respiratory: CTA B/L, No W/R/R  CV: RRR, +S1/S2, no murmurs, rubs or gallops  Abdominal: Soft, NT, ND +BSx4  Extremities: No C/C/E, + peripheral pulses  MSK: Normal ROM, no joint erythema or warmth, no joint swelling   Skin: warm, dry, normal color, no rash or abnormal lesions Vital Signs Last 24 Hrs  T(C): 36.5 (09 May 2025 05:29), Max: 37.1 (08 May 2025 17:35)  T(F): 97.7 (09 May 2025 05:29), Max: 98.8 (08 May 2025 17:35)  HR: 65 (09 May 2025 05:29) (59 - 70)  BP: 182/99 (09 May 2025 05:29) (147/88 - 182/99)  BP(mean): --  RR: 19 (09 May 2025 05:29) (18 - 19)  SpO2: 95% (09 May 2025 05:29) (94% - 96%)    Parameters below as of 09 May 2025 05:29  Patient On (Oxygen Delivery Method): room air      Physical Exam:  General: Well developed, well nourished, NAD  HEENT: NCAT, PERRLA, EOMI bl, moist mucous membranes   Neck: Supple, nontender, no mass  Neurology: A&Ox3   Respiratory: CTA B/L, No W/R/R  CV: RRR, +S1/S2, no murmurs, rubs or gallops  Abdominal: Soft, NT, ND +BSx4  Extremities: No C/C/E, + peripheral pulses  MSK: Normal ROM, no joint erythema or warmth, no joint swelling   Skin: warm, dry, normal color, no rash or abnormal lesions

## 2025-05-08 NOTE — PROGRESS NOTE ADULT - ASSESSMENT
59 year old female with a PMH of HTN, DM, Asthma/COPD, Current smoker (4-5 cig/day) presents to the ED for SOB. Patient was recently discharged from [place] (4/27/25) for COPD exacerbation. Endorses since being discharges has been having SOB progressively getting worse. Patient currently resides in a shelter, has not been taking her medications.     Acute hypoxemic respiratory failure 2/2 acute COPD exacerbation due to Covid-19  - completed Covid-19 isolation precautions  - Continue bronchodilators  - Prednisone  - Continue Azithromycin  - Supplemental oxygen PRN, wean as tolerated. Currently on 2L NC.    HTN  - Continue home medications    Left shoulder pain  - Xray negative  - Resolved    Diabetes mellitus  - A1C: 8.2  - FS/ISS  - CC diet    Anemia  - Stable  - Monitor CBC, transfuse PRN    DVT ppx: Lovenox    Dispo: Anticipate discharge home pending weaning of O2, likely within 24-48 hours   59 year old female with a PMH of HTN, DM, Asthma/COPD, Current smoker (4-5 cig/day) presents to the ED for SOB. Patient was recently discharged from [facility] (4/27/25) for COPD exacerbation. Endorses since being discharges has been having SOB progressively getting worse. Patient currently resides in a shelter, has not been taking her medications.     Acute hypoxemic respiratory failure 2/2 acute COPD exacerbation due to Covid-19  - completed Covid-19 isolation precautions  - Continue bronchodilators  - Prednisone  - Continue Azithromycin  - Supplemental oxygen PRN, wean as tolerated. Currently on 2L NC.    HTN  - Continue home medications    Left shoulder pain  - Xray negative  - Resolved    Diabetes mellitus  - A1C: 8.2  - FS/ISS  - CC diet    Anemia  - Stable  - Monitor CBC, transfuse PRN    DVT ppx: Lovenox    Dispo: Anticipate discharge to shelter in 24hrs, pending placement

## 2025-05-08 NOTE — DISCHARGE NOTE PROVIDER - NSDCMRMEDTOKEN_GEN_ALL_CORE_FT
Albuterol (Eqv-ProAir HFA) 90 mcg/inh inhalation aerosol: 2 puff(s) inhaled every 6 hours as needed for  shortness of breath and/or wheezing  cloNIDine 0.3 mg oral tablet: 1 tab(s) orally 3 times a day  metoprolol succinate 200 mg oral tablet, extended release: 1 tab(s) orally once a day  predniSONE 20 mg oral tablet: 2 tab(s) orally once a day  rosuvastatin 20 mg oral capsule: 1 cap(s) orally once a day (at bedtime)  verapamil 360 mg/24 hours oral capsule, extended release: 1 cap(s) orally once a day   albuterol 90 mcg/inh inhalation aerosol: 2 puff(s) inhaled every 6 hours As needed for shortness of breath and/or wheezing  cloNIDine 0.3 mg oral tablet: 1 tab(s) orally 3 times a day  isosorbide mononitrate 60 mg oral tablet, extended release: 1 tab(s) orally once a day  losartan 25 mg oral tablet: 1 tab(s) orally once a day  metFORMIN 1000 mg oral tablet, extended release: 1 tab(s) orally twice a day after breakfast and dinner  metoprolol succinate 200 mg oral tablet, extended release: 1 tab(s) orally once a day  predniSONE 20 mg oral tablet: 1 tab(s) orally once a day Slow taper: continue with 1.5 tab (30mg) for 7 days and then 1 tab (20mg) for next 7 days and then 0.5 tab (10mg) for the following 7 days and then stop.  rosuvastatin 20 mg oral tablet: 1 tab(s) orally once a day (at bedtime)  verapamil 120 mg oral tablet: 1 tab(s) orally every 8 hours   albuterol 90 mcg/inh inhalation aerosol: 2 puff(s) inhaled every 6 hours As needed for shortness of breath and/or wheezing  cloNIDine 0.3 mg oral tablet: 1 tab(s) orally 3 times a day  glipiZIDE 2.5 mg oral tablet: 1 tab(s) orally every 12 hours  isosorbide mononitrate 60 mg oral tablet, extended release: 1 tab(s) orally once a day  losartan 25 mg oral tablet: 1 tab(s) orally once a day  metFORMIN 500 mg oral tablet: 1 tab(s) orally every 12 hours  metoprolol succinate 100 mg oral capsule, extended release: 1 cap(s) orally once a day  predniSONE 20 mg oral tablet: 1 tab(s) orally once a day Slow taper: continue with 1.5 tab (30mg) for 3 days and then 1 tab (20mg) for next 3 days and then 0.5 tab (10mg) for the following 3 days and then stop.  rosuvastatin 20 mg oral tablet: 1 tab(s) orally once a day (at bedtime)  verapamil 120 mg oral tablet: 1 tab(s) orally every 8 hours

## 2025-05-08 NOTE — DISCHARGE NOTE PROVIDER - NSDCCPCAREPLAN_GEN_ALL_CORE_FT
PRINCIPAL DISCHARGE DIAGNOSIS  Diagnosis: COPD exacerbation  Assessment and Plan of Treatment: Acute hypoxemic respiratory failure 2/2 acute COPD exacerbation due to Covid-19  - completed Covid-19 isolation precautions  - Continue bronchodilators  - Started slow Prednisone taper over 21days   - s/p Azithromycin  - currently on RA satting >95%      SECONDARY DISCHARGE DIAGNOSES  Diagnosis: Hypertension  Assessment and Plan of Treatment: Continue with home meds  uncontrolled  Started Imdur and Losartan given h/o DM  follow up with outpatient cardiology    Diagnosis: Diabetes mellitus  Assessment and Plan of Treatment: - A1C: 8.2  - ISS while inpatient  - not on any antiglycemic, will dc with metformin  follow up with outpatient endo    Diagnosis: Anemia  Assessment and Plan of Treatment: Stable    Diagnosis: Shoulder pain, left  Assessment and Plan of Treatment: Left shoulder pain  - Xray negative  - Resolved     PRINCIPAL DISCHARGE DIAGNOSIS  Diagnosis: COPD exacerbation  Assessment and Plan of Treatment: Acute hypoxemic respiratory failure 2/2 acute COPD exacerbation due to Covid-19  - completed Covid-19 isolation precautions  - Continue bronchodilators  - Started slow Prednisone taper over 9days   - s/p Azithromycin  - currently on RA satting >95%      SECONDARY DISCHARGE DIAGNOSES  Diagnosis: Hypertension  Assessment and Plan of Treatment: Continue with home meds  uncontrolled  Decreased toprolol from 200mg QD to 100mg QD given sinus bradycardia   Started Imdur and Losartan given h/o DM  follow up with outpatient cardiology  if tolerates ARB, would increase Losartan and discontinue Imdur    Diagnosis: Diabetes mellitus  Assessment and Plan of Treatment: - A1C: 8.2  - ISS while inpatient  - not on any antiglycemic, will dc with metformin  follow up with outpatient endo    Diagnosis: Anemia  Assessment and Plan of Treatment: Stable    Diagnosis: Shoulder pain, left  Assessment and Plan of Treatment: Left shoulder pain  - Xray negative  - Resolved

## 2025-05-08 NOTE — DISCHARGE NOTE PROVIDER - DETAILS OF MALNUTRITION DIAGNOSIS/DIAGNOSES
This patient has been assessed with a concern for Malnutrition and was treated during this hospitalization for the following Nutrition diagnosis/diagnoses:     -  05/07/2025: Morbid obesity (BMI > 40)

## 2025-05-08 NOTE — DISCHARGE NOTE PROVIDER - CARE PROVIDERS DIRECT ADDRESSES
,[email or web address]

## 2025-05-08 NOTE — DISCHARGE NOTE PROVIDER - PROVIDER TOKENS
PROVIDER:[TOKEN:[[id number]],FOLLOWUP:[2 weeks]],PROVIDER:[TOKEN:[[id number]],FOLLOWUP:[2 weeks]]

## 2025-05-08 NOTE — DISCHARGE NOTE PROVIDER - CARE PROVIDER_API CALL
Micheal Jackson  Cardiovascular Disease  [address], [address]  [place] [address]  Phone: [phone]  Fax: [phone]  Follow Up Time: 2 weeks    Charlotte Tavares  Endocrinology/Metab/Diabetes  [address] [place] [address]  Phone: [phone]  Fax: [phone]  Follow Up Time: 2 weeks

## 2025-05-09 ENCOUNTER — TRANSCRIPTION ENCOUNTER (OUTPATIENT)
Age: 60
End: 2025-05-09

## 2025-05-09 VITALS
OXYGEN SATURATION: 95 % | SYSTOLIC BLOOD PRESSURE: 150 MMHG | DIASTOLIC BLOOD PRESSURE: 88 MMHG | TEMPERATURE: 99 F | HEART RATE: 72 BPM | RESPIRATION RATE: 18 BRPM

## 2025-05-09 LAB
ANION GAP SERPL CALC-SCNC: 12 MMOL/L — SIGNIFICANT CHANGE UP (ref 5–17)
BUN SERPL-MCNC: 23.9 MG/DL — HIGH (ref 8–20)
CALCIUM SERPL-MCNC: 8.6 MG/DL — SIGNIFICANT CHANGE UP (ref 8.4–10.5)
CHLORIDE SERPL-SCNC: 102 MMOL/L — SIGNIFICANT CHANGE UP (ref 96–108)
CO2 SERPL-SCNC: 26 MMOL/L — SIGNIFICANT CHANGE UP (ref 22–29)
CREAT SERPL-MCNC: 1.13 MG/DL — SIGNIFICANT CHANGE UP (ref 0.5–1.3)
EGFR: 55 ML/MIN/1.73M2 — LOW
EGFR: 55 ML/MIN/1.73M2 — LOW
GLUCOSE BLDC GLUCOMTR-MCNC: 225 MG/DL — HIGH (ref 70–99)
GLUCOSE BLDC GLUCOMTR-MCNC: 289 MG/DL — HIGH (ref 70–99)
GLUCOSE SERPL-MCNC: 175 MG/DL — HIGH (ref 70–99)
POTASSIUM SERPL-MCNC: 4.2 MMOL/L — SIGNIFICANT CHANGE UP (ref 3.5–5.3)
POTASSIUM SERPL-SCNC: 4.2 MMOL/L — SIGNIFICANT CHANGE UP (ref 3.5–5.3)
SODIUM SERPL-SCNC: 140 MMOL/L — SIGNIFICANT CHANGE UP (ref 135–145)

## 2025-05-09 PROCEDURE — 80076 HEPATIC FUNCTION PANEL: CPT

## 2025-05-09 PROCEDURE — 87641 MR-STAPH DNA AMP PROBE: CPT

## 2025-05-09 PROCEDURE — 80061 LIPID PANEL: CPT

## 2025-05-09 PROCEDURE — 73030 X-RAY EXAM OF SHOULDER: CPT

## 2025-05-09 PROCEDURE — 93010 ELECTROCARDIOGRAM REPORT: CPT

## 2025-05-09 PROCEDURE — 83036 HEMOGLOBIN GLYCOSYLATED A1C: CPT

## 2025-05-09 PROCEDURE — 85027 COMPLETE CBC AUTOMATED: CPT

## 2025-05-09 PROCEDURE — 87640 STAPH A DNA AMP PROBE: CPT

## 2025-05-09 PROCEDURE — 80053 COMPREHEN METABOLIC PANEL: CPT

## 2025-05-09 PROCEDURE — 82962 GLUCOSE BLOOD TEST: CPT

## 2025-05-09 PROCEDURE — 71045 X-RAY EXAM CHEST 1 VIEW: CPT

## 2025-05-09 PROCEDURE — 99239 HOSP IP/OBS DSCHRG MGMT >30: CPT

## 2025-05-09 PROCEDURE — 85730 THROMBOPLASTIN TIME PARTIAL: CPT

## 2025-05-09 PROCEDURE — 84100 ASSAY OF PHOSPHORUS: CPT

## 2025-05-09 PROCEDURE — 96374 THER/PROPH/DIAG INJ IV PUSH: CPT

## 2025-05-09 PROCEDURE — 99285 EMERGENCY DEPT VISIT HI MDM: CPT

## 2025-05-09 PROCEDURE — 83735 ASSAY OF MAGNESIUM: CPT

## 2025-05-09 PROCEDURE — 93005 ELECTROCARDIOGRAM TRACING: CPT

## 2025-05-09 PROCEDURE — 84484 ASSAY OF TROPONIN QUANT: CPT

## 2025-05-09 PROCEDURE — 94640 AIRWAY INHALATION TREATMENT: CPT

## 2025-05-09 PROCEDURE — 85610 PROTHROMBIN TIME: CPT

## 2025-05-09 PROCEDURE — 36415 COLL VENOUS BLD VENIPUNCTURE: CPT

## 2025-05-09 PROCEDURE — 80048 BASIC METABOLIC PNL TOTAL CA: CPT

## 2025-05-09 PROCEDURE — 0241U: CPT

## 2025-05-09 PROCEDURE — 87637 SARSCOV2&INF A&B&RSV AMP PRB: CPT

## 2025-05-09 PROCEDURE — 85025 COMPLETE CBC W/AUTO DIFF WBC: CPT

## 2025-05-09 PROCEDURE — 83880 ASSAY OF NATRIURETIC PEPTIDE: CPT

## 2025-05-09 RX ORDER — PREDNISONE 20 MG/1
1 TABLET ORAL
Qty: 21 | Refills: 0
Start: 2025-05-09 | End: 2025-05-29

## 2025-05-09 RX ORDER — LOSARTAN POTASSIUM 100 MG/1
1 TABLET, FILM COATED ORAL
Qty: 90 | Refills: 0
Start: 2025-05-09 | End: 2025-08-06

## 2025-05-09 RX ORDER — LOSARTAN POTASSIUM 100 MG/1
25 TABLET, FILM COATED ORAL DAILY
Refills: 0 | Status: DISCONTINUED | OUTPATIENT
Start: 2025-05-09 | End: 2025-05-09

## 2025-05-09 RX ORDER — METFORMIN HYDROCHLORIDE 850 MG/1
1 TABLET ORAL
Qty: 180 | Refills: 0
Start: 2025-05-09 | End: 2025-08-06

## 2025-05-09 RX ORDER — INSULIN LISPRO 100 U/ML
8 INJECTION, SOLUTION INTRAVENOUS; SUBCUTANEOUS
Refills: 0 | Status: DISCONTINUED | OUTPATIENT
Start: 2025-05-09 | End: 2025-05-09

## 2025-05-09 RX ORDER — ISOSORBIDE MONONITRATE 60 MG/1
1 TABLET, EXTENDED RELEASE ORAL
Qty: 90 | Refills: 0
Start: 2025-05-09 | End: 2025-08-06

## 2025-05-09 RX ORDER — PREDNISONE 20 MG/1
20 TABLET ORAL DAILY
Refills: 0 | Status: DISCONTINUED | OUTPATIENT
Start: 2025-05-09 | End: 2025-05-09

## 2025-05-09 RX ORDER — ALBUTEROL SULFATE 2.5 MG/3ML
2 VIAL, NEBULIZER (ML) INHALATION
Qty: 0 | Refills: 0 | DISCHARGE
Start: 2025-05-09

## 2025-05-09 RX ORDER — ROSUVASTATIN CALCIUM 20 MG/1
1 TABLET, FILM COATED ORAL
Qty: 0 | Refills: 0 | DISCHARGE
Start: 2025-05-09

## 2025-05-09 RX ORDER — METOPROLOL SUCCINATE 50 MG/1
1 TABLET, EXTENDED RELEASE ORAL
Qty: 0 | Refills: 0 | DISCHARGE
Start: 2025-05-09

## 2025-05-09 RX ORDER — INSULIN LISPRO 100 U/ML
10 INJECTION, SOLUTION INTRAVENOUS; SUBCUTANEOUS ONCE
Refills: 0 | Status: DISCONTINUED | OUTPATIENT
Start: 2025-05-09 | End: 2025-05-09

## 2025-05-09 RX ORDER — METOPROLOL SUCCINATE 50 MG/1
1 TABLET, EXTENDED RELEASE ORAL
Qty: 90 | Refills: 0
Start: 2025-05-09 | End: 2025-08-06

## 2025-05-09 RX ORDER — PREDNISONE 20 MG/1
1 TABLET ORAL
Qty: 9 | Refills: 0
Start: 2025-05-09 | End: 2025-05-17

## 2025-05-09 RX ADMIN — Medication 0.3 MILLIGRAM(S): at 13:16

## 2025-05-09 RX ADMIN — Medication 120 MILLIGRAM(S): at 05:15

## 2025-05-09 RX ADMIN — ISOSORBIDE MONONITRATE 60 MILLIGRAM(S): 60 TABLET, EXTENDED RELEASE ORAL at 13:16

## 2025-05-09 RX ADMIN — INSULIN LISPRO 4: 100 INJECTION, SOLUTION INTRAVENOUS; SUBCUTANEOUS at 13:15

## 2025-05-09 RX ADMIN — Medication 650 MILLIGRAM(S): at 06:19

## 2025-05-09 RX ADMIN — IPRATROPIUM BROMIDE AND ALBUTEROL SULFATE 3 MILLILITER(S): .5; 2.5 SOLUTION RESPIRATORY (INHALATION) at 01:51

## 2025-05-09 RX ADMIN — Medication 120 MILLIGRAM(S): at 13:16

## 2025-05-09 RX ADMIN — LIDOCAINE HYDROCHLORIDE 1 PATCH: 20 JELLY TOPICAL at 13:20

## 2025-05-09 RX ADMIN — Medication 0.3 MILLIGRAM(S): at 05:16

## 2025-05-09 RX ADMIN — PREDNISONE 40 MILLIGRAM(S): 20 TABLET ORAL at 05:15

## 2025-05-09 RX ADMIN — Medication 650 MILLIGRAM(S): at 05:19

## 2025-05-09 RX ADMIN — Medication 250 MILLIGRAM(S): at 05:16

## 2025-05-09 RX ADMIN — LIDOCAINE HYDROCHLORIDE 1 PATCH: 20 JELLY TOPICAL at 01:00

## 2025-05-09 RX ADMIN — INSULIN LISPRO 8 UNIT(S): 100 INJECTION, SOLUTION INTRAVENOUS; SUBCUTANEOUS at 13:19

## 2025-05-09 RX ADMIN — INSULIN LISPRO 6: 100 INJECTION, SOLUTION INTRAVENOUS; SUBCUTANEOUS at 08:05

## 2025-05-09 RX ADMIN — IPRATROPIUM BROMIDE AND ALBUTEROL SULFATE 3 MILLILITER(S): .5; 2.5 SOLUTION RESPIRATORY (INHALATION) at 08:34

## 2025-05-09 NOTE — DISCHARGE NOTE NURSING/CASE MANAGEMENT/SOCIAL WORK - PATIENT PORTAL LINK FT
You can access the FollowMyHealth Patient Portal offered by [facility] by registering at the following website: [email or web address]. By joining [facility]’s FollowMyHealth portal, you will also be able to view your health information using other applications (apps) compatible with our system.

## 2025-05-09 NOTE — DISCHARGE NOTE NURSING/CASE MANAGEMENT/SOCIAL WORK - FINANCIAL ASSISTANCE
[facility] provides services at a reduced cost to those who are determined to be eligible through [facility]’s financial assistance program. Information regarding [facility]’s financial assistance program can be found by going to [email or web address] or by calling [phone].

## 2025-05-09 NOTE — DISCHARGE NOTE NURSING/CASE MANAGEMENT/SOCIAL WORK - NSDCVIVACCINE_GEN_ALL_CORE_FT
Tdap; 29-Oct-2024 10:10; Rosendo Murillo (RN); [employer]; [id number] (Exp. Date: 04-Jan-2026); IntraMuscular; Deltoid Left.; 0.5 milliLiter(s); VIS (VIS Published: 09-May-2013, VIS Presented: 29-Oct-2024);

## 2025-07-05 ENCOUNTER — EMERGENCY (EMERGENCY)
Facility: HOSPITAL | Age: 60
LOS: 1 days | End: 2025-07-05
Attending: EMERGENCY MEDICINE
Payer: MEDICAID

## 2025-07-05 VITALS
WEIGHT: 240.08 LBS | SYSTOLIC BLOOD PRESSURE: 183 MMHG | DIASTOLIC BLOOD PRESSURE: 98 MMHG | HEART RATE: 58 BPM | HEIGHT: 65 IN | OXYGEN SATURATION: 94 % | TEMPERATURE: 98 F | RESPIRATION RATE: 18 BRPM

## 2025-07-05 PROCEDURE — 99283 EMERGENCY DEPT VISIT LOW MDM: CPT | Mod: 25

## 2025-07-05 PROCEDURE — 71046 X-RAY EXAM CHEST 2 VIEWS: CPT | Mod: 26

## 2025-07-05 PROCEDURE — 71046 X-RAY EXAM CHEST 2 VIEWS: CPT

## 2025-07-05 PROCEDURE — 93010 ELECTROCARDIOGRAM REPORT: CPT

## 2025-07-05 PROCEDURE — 93005 ELECTROCARDIOGRAM TRACING: CPT

## 2025-07-05 PROCEDURE — 99284 EMERGENCY DEPT VISIT MOD MDM: CPT

## 2025-07-05 RX ORDER — METHYLPREDNISOLONE ACETATE 80 MG/ML
125 INJECTION, SUSPENSION INTRA-ARTICULAR; INTRALESIONAL; INTRAMUSCULAR; SOFT TISSUE ONCE
Refills: 0 | Status: DISCONTINUED | OUTPATIENT
Start: 2025-07-05 | End: 2025-07-13

## 2025-07-05 RX ORDER — IPRATROPIUM BROMIDE AND ALBUTEROL SULFATE .5; 2.5 MG/3ML; MG/3ML
3 SOLUTION RESPIRATORY (INHALATION) EVERY 6 HOURS
Refills: 0 | Status: DISCONTINUED | OUTPATIENT
Start: 2025-07-05 | End: 2025-07-13

## 2025-07-16 ENCOUNTER — NON-APPOINTMENT (OUTPATIENT)
Age: 60
End: 2025-07-16

## 2025-08-13 ENCOUNTER — OFFICE (OUTPATIENT)
Dept: URBAN - METROPOLITAN AREA CLINIC 115 | Facility: CLINIC | Age: 60
Setting detail: OPHTHALMOLOGY
End: 2025-08-13
Payer: MEDICAID

## 2025-08-13 DIAGNOSIS — H50.022: ICD-10-CM

## 2025-08-13 DIAGNOSIS — H40.013: ICD-10-CM

## 2025-08-13 PROCEDURE — 92133 CPTRZD OPH DX IMG PST SGM ON: CPT | Performed by: OPTOMETRIST

## 2025-08-13 PROCEDURE — 92002 INTRM OPH EXAM NEW PATIENT: CPT | Performed by: OPTOMETRIST

## 2025-08-13 ASSESSMENT — REFRACTION_AUTOREFRACTION
OD_AXIS: 06
OS_SPHERE: -0.75
OS_AXIS: 171
OD_SPHERE: +1.75
OD_CYLINDER: -1.00
OS_CYLINDER: -4.50

## 2025-08-13 ASSESSMENT — TONOMETRY: OD_IOP_MMHG: 20

## 2025-08-13 ASSESSMENT — VISUAL ACUITY
OS_BCVA: 20/40-1
OD_BCVA: 20/NLP

## 2025-08-30 ENCOUNTER — EMERGENCY (EMERGENCY)
Facility: HOSPITAL | Age: 60
LOS: 1 days | End: 2025-08-30
Attending: STUDENT IN AN ORGANIZED HEALTH CARE EDUCATION/TRAINING PROGRAM
Payer: MEDICAID

## 2025-08-30 VITALS
HEART RATE: 77 BPM | TEMPERATURE: 98 F | RESPIRATION RATE: 19 BRPM | SYSTOLIC BLOOD PRESSURE: 163 MMHG | HEIGHT: 65 IN | OXYGEN SATURATION: 93 % | DIASTOLIC BLOOD PRESSURE: 99 MMHG

## 2025-08-30 PROCEDURE — 93010 ELECTROCARDIOGRAM REPORT: CPT

## 2025-08-30 PROCEDURE — 99284 EMERGENCY DEPT VISIT MOD MDM: CPT

## 2025-08-30 PROCEDURE — 99283 EMERGENCY DEPT VISIT LOW MDM: CPT

## 2025-08-30 RX ORDER — OFLOXACIN 3 MG/ML
1 SOLUTION OPHTHALMIC
Qty: 1 | Refills: 0
Start: 2025-08-30 | End: 2025-09-03

## 2025-08-30 RX ORDER — ACETAZOLAMIDE 250 MG/1
500 TABLET ORAL ONCE
Refills: 0 | Status: COMPLETED | OUTPATIENT
Start: 2025-08-30 | End: 2025-08-30

## 2025-08-30 RX ORDER — OFLOXACIN 3 MG/ML
1 SOLUTION OPHTHALMIC ONCE
Refills: 0 | Status: COMPLETED | OUTPATIENT
Start: 2025-08-30 | End: 2025-08-30

## 2025-08-30 RX ADMIN — OFLOXACIN 1 DROP(S): 3 SOLUTION OPHTHALMIC at 09:18

## 2025-08-30 RX ADMIN — ACETAZOLAMIDE 500 MILLIGRAM(S): 250 TABLET ORAL at 09:17
